# Patient Record
Sex: FEMALE | Race: BLACK OR AFRICAN AMERICAN | NOT HISPANIC OR LATINO
[De-identification: names, ages, dates, MRNs, and addresses within clinical notes are randomized per-mention and may not be internally consistent; named-entity substitution may affect disease eponyms.]

---

## 2017-05-22 ENCOUNTER — APPOINTMENT (OUTPATIENT)
Dept: ORTHOPEDIC SURGERY | Facility: CLINIC | Age: 52
End: 2017-05-22

## 2017-06-05 ENCOUNTER — APPOINTMENT (OUTPATIENT)
Dept: ORTHOPEDIC SURGERY | Facility: CLINIC | Age: 52
End: 2017-06-05

## 2017-06-05 VITALS
SYSTOLIC BLOOD PRESSURE: 115 MMHG | HEART RATE: 91 BPM | DIASTOLIC BLOOD PRESSURE: 72 MMHG | BODY MASS INDEX: 33.64 KG/M2 | WEIGHT: 235 LBS | HEIGHT: 70 IN

## 2017-06-05 VITALS
HEART RATE: 91 BPM | HEIGHT: 70 IN | WEIGHT: 235 LBS | BODY MASS INDEX: 33.64 KG/M2 | SYSTOLIC BLOOD PRESSURE: 129 MMHG | DIASTOLIC BLOOD PRESSURE: 79 MMHG

## 2017-06-10 RX ORDER — HYALURONATE SOD, CROSS-LINKED 30 MG/3 ML
30 SYRINGE (ML) INTRAARTICULAR
Refills: 0 | Status: COMPLETED | OUTPATIENT
Start: 2017-06-10

## 2017-06-10 RX ADMIN — Medication 0 MG/3ML: at 00:00

## 2017-07-31 ENCOUNTER — OUTPATIENT (OUTPATIENT)
Dept: OUTPATIENT SERVICES | Facility: HOSPITAL | Age: 52
LOS: 1 days | End: 2017-07-31
Payer: COMMERCIAL

## 2017-07-31 ENCOUNTER — APPOINTMENT (OUTPATIENT)
Dept: UROGYNECOLOGY | Facility: CLINIC | Age: 52
End: 2017-07-31
Payer: COMMERCIAL

## 2017-07-31 VITALS
TEMPERATURE: 99 F | RESPIRATION RATE: 16 BRPM | SYSTOLIC BLOOD PRESSURE: 127 MMHG | DIASTOLIC BLOOD PRESSURE: 76 MMHG | WEIGHT: 242.51 LBS | HEART RATE: 80 BPM | HEIGHT: 70 IN

## 2017-07-31 VITALS
WEIGHT: 240 LBS | SYSTOLIC BLOOD PRESSURE: 110 MMHG | DIASTOLIC BLOOD PRESSURE: 70 MMHG | BODY MASS INDEX: 34.36 KG/M2 | HEIGHT: 70 IN

## 2017-07-31 DIAGNOSIS — Z01.818 ENCOUNTER FOR OTHER PREPROCEDURAL EXAMINATION: ICD-10-CM

## 2017-07-31 DIAGNOSIS — R35.0 FREQUENCY OF MICTURITION: ICD-10-CM

## 2017-07-31 DIAGNOSIS — N84.1 POLYP OF CERVIX UTERI: ICD-10-CM

## 2017-07-31 DIAGNOSIS — Z83.511 FAMILY HISTORY OF GLAUCOMA: ICD-10-CM

## 2017-07-31 DIAGNOSIS — Z98.890 OTHER SPECIFIED POSTPROCEDURAL STATES: Chronic | ICD-10-CM

## 2017-07-31 DIAGNOSIS — Z82.49 FAMILY HISTORY OF ISCHEMIC HEART DISEASE AND OTHER DISEASES OF THE CIRCULATORY SYSTEM: ICD-10-CM

## 2017-07-31 DIAGNOSIS — L73.9 FOLLICULAR DISORDER, UNSPECIFIED: Chronic | ICD-10-CM

## 2017-07-31 DIAGNOSIS — Z86.018 PERSONAL HISTORY OF OTHER BENIGN NEOPLASM: ICD-10-CM

## 2017-07-31 DIAGNOSIS — Z83.49 FAMILY HISTORY OF OTHER ENDOCRINE, NUTRITIONAL AND METABOLIC DISEASES: ICD-10-CM

## 2017-07-31 DIAGNOSIS — R35.1 NOCTURIA: ICD-10-CM

## 2017-07-31 DIAGNOSIS — Z78.9 OTHER SPECIFIED HEALTH STATUS: ICD-10-CM

## 2017-07-31 DIAGNOSIS — R73.03 PREDIABETES: ICD-10-CM

## 2017-07-31 LAB
ANION GAP SERPL CALC-SCNC: 13 MMOL/L — SIGNIFICANT CHANGE UP (ref 5–17)
APTT BLD: 29.9 SEC — SIGNIFICANT CHANGE UP (ref 27.5–37.4)
BILIRUB UR QL STRIP: NEGATIVE
BLD GP AB SCN SERPL QL: SIGNIFICANT CHANGE UP
BUN SERPL-MCNC: 13 MG/DL — SIGNIFICANT CHANGE UP (ref 8–20)
CALCIUM SERPL-MCNC: 9.3 MG/DL — SIGNIFICANT CHANGE UP (ref 8.6–10.2)
CHLORIDE SERPL-SCNC: 105 MMOL/L — SIGNIFICANT CHANGE UP (ref 98–107)
CLARITY UR: NORMAL
CO2 SERPL-SCNC: 22 MMOL/L — SIGNIFICANT CHANGE UP (ref 22–29)
COLLECTION METHOD: NORMAL
CREAT SERPL-MCNC: 0.77 MG/DL — SIGNIFICANT CHANGE UP (ref 0.5–1.3)
GLUCOSE SERPL-MCNC: 81 MG/DL — SIGNIFICANT CHANGE UP (ref 70–115)
GLUCOSE UR-MCNC: NEGATIVE
HBA1C BLD-MCNC: 5.4 % — SIGNIFICANT CHANGE UP (ref 4–5.6)
HCG UR QL: 0.2 EU/DL
HCT VFR BLD CALC: 40.3 % — SIGNIFICANT CHANGE UP (ref 37–47)
HGB BLD-MCNC: 13.5 G/DL — SIGNIFICANT CHANGE UP (ref 12–16)
HGB UR QL STRIP.AUTO: NORMAL
INR BLD: 0.95 RATIO — SIGNIFICANT CHANGE UP (ref 0.88–1.16)
KETONES UR-MCNC: NEGATIVE
LEUKOCYTE ESTERASE UR QL STRIP: NEGATIVE
MCHC RBC-ENTMCNC: 28.9 PG — SIGNIFICANT CHANGE UP (ref 27–31)
MCHC RBC-ENTMCNC: 33.5 G/DL — SIGNIFICANT CHANGE UP (ref 32–36)
MCV RBC AUTO: 86.3 FL — SIGNIFICANT CHANGE UP (ref 81–99)
NITRITE UR QL STRIP: NEGATIVE
PH UR STRIP: 6.5
PLATELET # BLD AUTO: 318 K/UL — SIGNIFICANT CHANGE UP (ref 150–400)
POTASSIUM SERPL-MCNC: 4.5 MMOL/L — SIGNIFICANT CHANGE UP (ref 3.5–5.3)
POTASSIUM SERPL-SCNC: 4.5 MMOL/L — SIGNIFICANT CHANGE UP (ref 3.5–5.3)
PROT UR STRIP-MCNC: NEGATIVE
PROTHROM AB SERPL-ACNC: 10.4 SEC — SIGNIFICANT CHANGE UP (ref 9.8–12.7)
RBC # BLD: 4.67 M/UL — SIGNIFICANT CHANGE UP (ref 4.4–5.2)
RBC # FLD: 14.1 % — SIGNIFICANT CHANGE UP (ref 11–15.6)
SODIUM SERPL-SCNC: 140 MMOL/L — SIGNIFICANT CHANGE UP (ref 135–145)
SP GR UR STRIP: 1.02
TYPE + AB SCN PNL BLD: SIGNIFICANT CHANGE UP
WBC # BLD: 5.6 K/UL — SIGNIFICANT CHANGE UP (ref 4.8–10.8)
WBC # FLD AUTO: 5.6 K/UL — SIGNIFICANT CHANGE UP (ref 4.8–10.8)

## 2017-07-31 PROCEDURE — 83036 HEMOGLOBIN GLYCOSYLATED A1C: CPT

## 2017-07-31 PROCEDURE — G0463: CPT

## 2017-07-31 PROCEDURE — 36415 COLL VENOUS BLD VENIPUNCTURE: CPT

## 2017-07-31 PROCEDURE — 85610 PROTHROMBIN TIME: CPT

## 2017-07-31 PROCEDURE — 93005 ELECTROCARDIOGRAM TRACING: CPT

## 2017-07-31 PROCEDURE — 81003 URINALYSIS AUTO W/O SCOPE: CPT | Mod: QW

## 2017-07-31 PROCEDURE — 86901 BLOOD TYPING SEROLOGIC RH(D): CPT

## 2017-07-31 PROCEDURE — 51701 INSERT BLADDER CATHETER: CPT

## 2017-07-31 PROCEDURE — 85730 THROMBOPLASTIN TIME PARTIAL: CPT

## 2017-07-31 PROCEDURE — 80048 BASIC METABOLIC PNL TOTAL CA: CPT

## 2017-07-31 PROCEDURE — 85027 COMPLETE CBC AUTOMATED: CPT

## 2017-07-31 PROCEDURE — 86850 RBC ANTIBODY SCREEN: CPT

## 2017-07-31 PROCEDURE — 93010 ELECTROCARDIOGRAM REPORT: CPT

## 2017-07-31 PROCEDURE — 99244 OFF/OP CNSLTJ NEW/EST MOD 40: CPT | Mod: 25

## 2017-07-31 PROCEDURE — 86900 BLOOD TYPING SEROLOGIC ABO: CPT

## 2017-07-31 NOTE — H&P PST ADULT - PROBLEM SELECTOR PLAN 1
Hysterectomy, Dilation and Curettage, Polypectomy  Pt will obtain clearance with Endocrinologist as he is the one who manages pt's Diabetes

## 2017-07-31 NOTE — H&P PST ADULT - EKG AND INTERPRETATION
EKG demonstrates RSR at 80bpm with Q-wave and T Wave inversion noted in Lead III.  Pending official reading

## 2017-07-31 NOTE — H&P PST ADULT - FAMILY HISTORY
Father  Still living? Yes, Estimated age: 71-80  Family history of acute myocardial infarction, Age at diagnosis: 61-70  Family history of pancreatic cancer, Age at diagnosis: Age Unknown  Family history of hypertension, Age at diagnosis: Age Unknown     Mother  Still living? No  Family history of gastric cancer, Age at diagnosis: 51-60

## 2017-07-31 NOTE — H&P PST ADULT - HISTORY OF PRESENT ILLNESS
This is a 52 y.o female who presents to PST today.  The pt reports she was undergoing a routine PAP examination and was noted to have a  polyp.  She is scheduled for surgery in the near future.

## 2017-07-31 NOTE — H&P PST ADULT - NSANTHOSAYNRD_GEN_A_CORE
No. REYNALDO screening performed.  STOP BANG Legend: 0-2 = LOW Risk; 3-4 = INTERMEDIATE Risk; 5-8 = HIGH Risk

## 2017-07-31 NOTE — H&P PST ADULT - RS GEN PE MLT RESP DETAILS PC
respirations non-labored/airway patent/diminished breath sounds, R/diminished breath sounds, L/normal

## 2017-08-04 ENCOUNTER — RESULT REVIEW (OUTPATIENT)
Age: 52
End: 2017-08-04

## 2017-08-04 ENCOUNTER — OUTPATIENT (OUTPATIENT)
Dept: OUTPATIENT SERVICES | Facility: HOSPITAL | Age: 52
LOS: 1 days | End: 2017-08-04
Payer: COMMERCIAL

## 2017-08-04 ENCOUNTER — TRANSCRIPTION ENCOUNTER (OUTPATIENT)
Age: 52
End: 2017-08-04

## 2017-08-04 VITALS
OXYGEN SATURATION: 100 % | DIASTOLIC BLOOD PRESSURE: 64 MMHG | SYSTOLIC BLOOD PRESSURE: 120 MMHG | RESPIRATION RATE: 18 BRPM | HEART RATE: 80 BPM

## 2017-08-04 VITALS
OXYGEN SATURATION: 97 % | RESPIRATION RATE: 16 BRPM | HEART RATE: 72 BPM | SYSTOLIC BLOOD PRESSURE: 110 MMHG | DIASTOLIC BLOOD PRESSURE: 64 MMHG | TEMPERATURE: 98 F | HEIGHT: 70 IN | WEIGHT: 229.94 LBS

## 2017-08-04 DIAGNOSIS — L73.9 FOLLICULAR DISORDER, UNSPECIFIED: Chronic | ICD-10-CM

## 2017-08-04 DIAGNOSIS — N84.1 POLYP OF CERVIX UTERI: ICD-10-CM

## 2017-08-04 DIAGNOSIS — Z01.818 ENCOUNTER FOR OTHER PREPROCEDURAL EXAMINATION: ICD-10-CM

## 2017-08-04 DIAGNOSIS — Z98.890 OTHER SPECIFIED POSTPROCEDURAL STATES: Chronic | ICD-10-CM

## 2017-08-04 PROCEDURE — 58558 HYSTEROSCOPY BIOPSY: CPT

## 2017-08-04 PROCEDURE — 88305 TISSUE EXAM BY PATHOLOGIST: CPT

## 2017-08-04 PROCEDURE — 88305 TISSUE EXAM BY PATHOLOGIST: CPT | Mod: 26

## 2017-08-04 RX ORDER — HYDROMORPHONE HYDROCHLORIDE 2 MG/ML
0.5 INJECTION INTRAMUSCULAR; INTRAVENOUS; SUBCUTANEOUS
Qty: 0 | Refills: 0 | Status: DISCONTINUED | OUTPATIENT
Start: 2017-08-04 | End: 2017-08-04

## 2017-08-04 RX ORDER — ONDANSETRON 8 MG/1
4 TABLET, FILM COATED ORAL ONCE
Qty: 0 | Refills: 0 | Status: DISCONTINUED | OUTPATIENT
Start: 2017-08-04 | End: 2017-08-04

## 2017-08-04 RX ORDER — METFORMIN HYDROCHLORIDE 850 MG/1
1 TABLET ORAL
Qty: 0 | Refills: 0 | COMMUNITY

## 2017-08-04 RX ORDER — CABERGOLINE 0.5 MG/1
0 TABLET ORAL
Qty: 0 | Refills: 0 | COMMUNITY

## 2017-08-04 RX ORDER — SODIUM CHLORIDE 9 MG/ML
1000 INJECTION INTRAMUSCULAR; INTRAVENOUS; SUBCUTANEOUS
Qty: 0 | Refills: 0 | Status: DISCONTINUED | OUTPATIENT
Start: 2017-08-04 | End: 2017-08-04

## 2017-08-04 RX ORDER — SODIUM CHLORIDE 9 MG/ML
3 INJECTION INTRAMUSCULAR; INTRAVENOUS; SUBCUTANEOUS ONCE
Qty: 0 | Refills: 0 | Status: DISCONTINUED | OUTPATIENT
Start: 2017-08-04 | End: 2017-08-04

## 2017-08-04 NOTE — BRIEF OPERATIVE NOTE - PROCEDURE
Hysteroscopy  08/04/2017    Active  [email or web address]  Dilation and curettage  08/04/2017    Active  [email or web address]  Endocervical polypectomy  08/04/2017    Active  AFIA

## 2017-08-07 LAB — SURGICAL PATHOLOGY FINAL REPORT - CH: SIGNIFICANT CHANGE UP

## 2017-08-21 ENCOUNTER — APPOINTMENT (OUTPATIENT)
Dept: UROGYNECOLOGY | Facility: CLINIC | Age: 52
End: 2017-08-21
Payer: COMMERCIAL

## 2017-08-21 VITALS
DIASTOLIC BLOOD PRESSURE: 88 MMHG | SYSTOLIC BLOOD PRESSURE: 130 MMHG | WEIGHT: 240 LBS | HEIGHT: 70 IN | BODY MASS INDEX: 34.36 KG/M2

## 2017-08-21 PROCEDURE — 51797 INTRAABDOMINAL PRESSURE TEST: CPT

## 2017-08-21 PROCEDURE — 51729 CYSTOMETROGRAM W/VP&UP: CPT

## 2017-08-21 PROCEDURE — 51741 ELECTRO-UROFLOWMETRY FIRST: CPT

## 2017-08-21 PROCEDURE — 51784 ANAL/URINARY MUSCLE STUDY: CPT

## 2017-11-01 ENCOUNTER — OTHER (OUTPATIENT)
Age: 52
End: 2017-11-01

## 2017-11-02 ENCOUNTER — APPOINTMENT (OUTPATIENT)
Dept: UROGYNECOLOGY | Facility: CLINIC | Age: 52
End: 2017-11-02
Payer: COMMERCIAL

## 2017-11-02 PROCEDURE — 99214 OFFICE O/P EST MOD 30 MIN: CPT

## 2017-11-18 ENCOUNTER — APPOINTMENT (OUTPATIENT)
Dept: ORTHOPEDIC SURGERY | Facility: CLINIC | Age: 52
End: 2017-11-18
Payer: COMMERCIAL

## 2017-11-18 VITALS
WEIGHT: 240 LBS | HEIGHT: 70 IN | SYSTOLIC BLOOD PRESSURE: 128 MMHG | BODY MASS INDEX: 34.36 KG/M2 | DIASTOLIC BLOOD PRESSURE: 77 MMHG | HEART RATE: 97 BPM

## 2017-11-18 PROCEDURE — 99214 OFFICE O/P EST MOD 30 MIN: CPT

## 2017-11-18 PROCEDURE — 73564 X-RAY EXAM KNEE 4 OR MORE: CPT | Mod: 50

## 2017-12-05 ENCOUNTER — OUTPATIENT (OUTPATIENT)
Dept: OUTPATIENT SERVICES | Facility: HOSPITAL | Age: 52
LOS: 1 days | End: 2017-12-05
Payer: COMMERCIAL

## 2017-12-05 VITALS
HEART RATE: 95 BPM | DIASTOLIC BLOOD PRESSURE: 77 MMHG | RESPIRATION RATE: 16 BRPM | WEIGHT: 246.92 LBS | TEMPERATURE: 98 F | SYSTOLIC BLOOD PRESSURE: 130 MMHG | HEIGHT: 70 IN

## 2017-12-05 DIAGNOSIS — L73.9 FOLLICULAR DISORDER, UNSPECIFIED: Chronic | ICD-10-CM

## 2017-12-05 DIAGNOSIS — Z01.818 ENCOUNTER FOR OTHER PREPROCEDURAL EXAMINATION: ICD-10-CM

## 2017-12-05 DIAGNOSIS — Z98.890 OTHER SPECIFIED POSTPROCEDURAL STATES: Chronic | ICD-10-CM

## 2017-12-05 DIAGNOSIS — N39.3 STRESS INCONTINENCE (FEMALE) (MALE): ICD-10-CM

## 2017-12-05 LAB
ANION GAP SERPL CALC-SCNC: 13 MMOL/L — SIGNIFICANT CHANGE UP (ref 5–17)
APPEARANCE UR: CLEAR — SIGNIFICANT CHANGE UP
APTT BLD: 31.1 SEC — SIGNIFICANT CHANGE UP (ref 27.5–37.4)
BACTERIA # UR AUTO: ABNORMAL
BASOPHILS # BLD AUTO: 0 K/UL — SIGNIFICANT CHANGE UP (ref 0–0.2)
BASOPHILS NFR BLD AUTO: 0.6 % — SIGNIFICANT CHANGE UP (ref 0–2)
BILIRUB UR-MCNC: NEGATIVE — SIGNIFICANT CHANGE UP
BLD GP AB SCN SERPL QL: SIGNIFICANT CHANGE UP
BUN SERPL-MCNC: 14 MG/DL — SIGNIFICANT CHANGE UP (ref 8–20)
CALCIUM SERPL-MCNC: 9.6 MG/DL — SIGNIFICANT CHANGE UP (ref 8.6–10.2)
CHLORIDE SERPL-SCNC: 102 MMOL/L — SIGNIFICANT CHANGE UP (ref 98–107)
CO2 SERPL-SCNC: 26 MMOL/L — SIGNIFICANT CHANGE UP (ref 22–29)
COLOR SPEC: YELLOW — SIGNIFICANT CHANGE UP
CREAT SERPL-MCNC: 0.87 MG/DL — SIGNIFICANT CHANGE UP (ref 0.5–1.3)
DIFF PNL FLD: ABNORMAL
EOSINOPHIL # BLD AUTO: 0.1 K/UL — SIGNIFICANT CHANGE UP (ref 0–0.5)
EOSINOPHIL NFR BLD AUTO: 1.7 % — SIGNIFICANT CHANGE UP (ref 0–6)
EPI CELLS # UR: SIGNIFICANT CHANGE UP
GLUCOSE SERPL-MCNC: 111 MG/DL — SIGNIFICANT CHANGE UP (ref 70–115)
GLUCOSE UR QL: NEGATIVE MG/DL — SIGNIFICANT CHANGE UP
HCT VFR BLD CALC: 40.4 % — SIGNIFICANT CHANGE UP (ref 37–47)
HGB BLD-MCNC: 13.4 G/DL — SIGNIFICANT CHANGE UP (ref 12–16)
INR BLD: 0.97 RATIO — SIGNIFICANT CHANGE UP (ref 0.88–1.16)
KETONES UR-MCNC: ABNORMAL
LEUKOCYTE ESTERASE UR-ACNC: ABNORMAL
LYMPHOCYTES # BLD AUTO: 2.2 K/UL — SIGNIFICANT CHANGE UP (ref 1–4.8)
LYMPHOCYTES # BLD AUTO: 42.4 % — SIGNIFICANT CHANGE UP (ref 20–55)
MCHC RBC-ENTMCNC: 28.8 PG — SIGNIFICANT CHANGE UP (ref 27–31)
MCHC RBC-ENTMCNC: 33.2 G/DL — SIGNIFICANT CHANGE UP (ref 32–36)
MCV RBC AUTO: 86.7 FL — SIGNIFICANT CHANGE UP (ref 81–99)
MONOCYTES # BLD AUTO: 0.4 K/UL — SIGNIFICANT CHANGE UP (ref 0–0.8)
MONOCYTES NFR BLD AUTO: 7.7 % — SIGNIFICANT CHANGE UP (ref 3–10)
NEUTROPHILS # BLD AUTO: 2.5 K/UL — SIGNIFICANT CHANGE UP (ref 1.8–8)
NEUTROPHILS NFR BLD AUTO: 47.6 % — SIGNIFICANT CHANGE UP (ref 37–73)
NITRITE UR-MCNC: NEGATIVE — SIGNIFICANT CHANGE UP
PH UR: 6 — SIGNIFICANT CHANGE UP (ref 5–8)
PLATELET # BLD AUTO: 325 K/UL — SIGNIFICANT CHANGE UP (ref 150–400)
POTASSIUM SERPL-MCNC: 4 MMOL/L — SIGNIFICANT CHANGE UP (ref 3.5–5.3)
POTASSIUM SERPL-SCNC: 4 MMOL/L — SIGNIFICANT CHANGE UP (ref 3.5–5.3)
PROT UR-MCNC: 15 MG/DL
PROTHROM AB SERPL-ACNC: 10.7 SEC — SIGNIFICANT CHANGE UP (ref 9.8–12.7)
RBC # BLD: 4.66 M/UL — SIGNIFICANT CHANGE UP (ref 4.4–5.2)
RBC # FLD: 13.8 % — SIGNIFICANT CHANGE UP (ref 11–15.6)
RBC CASTS # UR COMP ASSIST: SIGNIFICANT CHANGE UP /HPF (ref 0–4)
SODIUM SERPL-SCNC: 141 MMOL/L — SIGNIFICANT CHANGE UP (ref 135–145)
SP GR SPEC: 1.02 — SIGNIFICANT CHANGE UP (ref 1.01–1.02)
TYPE + AB SCN PNL BLD: SIGNIFICANT CHANGE UP
UROBILINOGEN FLD QL: NEGATIVE MG/DL — SIGNIFICANT CHANGE UP
WBC # BLD: 5.2 K/UL — SIGNIFICANT CHANGE UP (ref 4.8–10.8)
WBC # FLD AUTO: 5.2 K/UL — SIGNIFICANT CHANGE UP (ref 4.8–10.8)
WBC UR QL: SIGNIFICANT CHANGE UP

## 2017-12-05 PROCEDURE — 80048 BASIC METABOLIC PNL TOTAL CA: CPT

## 2017-12-05 PROCEDURE — G0463: CPT

## 2017-12-05 PROCEDURE — 93005 ELECTROCARDIOGRAM TRACING: CPT

## 2017-12-05 PROCEDURE — 86900 BLOOD TYPING SEROLOGIC ABO: CPT

## 2017-12-05 PROCEDURE — 36415 COLL VENOUS BLD VENIPUNCTURE: CPT

## 2017-12-05 PROCEDURE — 85610 PROTHROMBIN TIME: CPT

## 2017-12-05 PROCEDURE — 85730 THROMBOPLASTIN TIME PARTIAL: CPT

## 2017-12-05 PROCEDURE — 86901 BLOOD TYPING SEROLOGIC RH(D): CPT

## 2017-12-05 PROCEDURE — 87086 URINE CULTURE/COLONY COUNT: CPT

## 2017-12-05 PROCEDURE — 86850 RBC ANTIBODY SCREEN: CPT

## 2017-12-05 PROCEDURE — 81001 URINALYSIS AUTO W/SCOPE: CPT

## 2017-12-05 PROCEDURE — 85027 COMPLETE CBC AUTOMATED: CPT

## 2017-12-05 PROCEDURE — 93010 ELECTROCARDIOGRAM REPORT: CPT

## 2017-12-05 RX ORDER — CEFAZOLIN SODIUM 1 G
2000 VIAL (EA) INJECTION ONCE
Qty: 0 | Refills: 0 | Status: DISCONTINUED | OUTPATIENT
Start: 2017-12-19 | End: 2018-01-03

## 2017-12-05 RX ORDER — SODIUM CHLORIDE 9 MG/ML
3 INJECTION INTRAMUSCULAR; INTRAVENOUS; SUBCUTANEOUS ONCE
Qty: 0 | Refills: 0 | Status: DISCONTINUED | OUTPATIENT
Start: 2017-12-19 | End: 2018-01-03

## 2017-12-05 RX ORDER — CABERGOLINE 0.5 MG/1
0 TABLET ORAL
Qty: 13 | Refills: 0 | COMMUNITY

## 2017-12-05 NOTE — H&P PST ADULT - ASSESSMENT
52 year old female with H/o PCOS presents with c/o stress incontinence. She is now schedule for suburethral sling, Cystoscopy

## 2017-12-05 NOTE — H&P PST ADULT - PSH
Disorder of hair follicle  Beneith bilateral axilla  H/O arthroscopy of right knee    S/P dilatation and curettage

## 2017-12-06 LAB
CULTURE RESULTS: SIGNIFICANT CHANGE UP
SPECIMEN SOURCE: SIGNIFICANT CHANGE UP

## 2017-12-08 ENCOUNTER — CHART COPY (OUTPATIENT)
Age: 52
End: 2017-12-08

## 2017-12-19 ENCOUNTER — APPOINTMENT (OUTPATIENT)
Dept: UROGYNECOLOGY | Facility: HOSPITAL | Age: 52
End: 2017-12-19
Payer: COMMERCIAL

## 2017-12-19 ENCOUNTER — OUTPATIENT (OUTPATIENT)
Dept: OUTPATIENT SERVICES | Facility: HOSPITAL | Age: 52
LOS: 1 days | End: 2017-12-19
Payer: COMMERCIAL

## 2017-12-19 ENCOUNTER — TRANSCRIPTION ENCOUNTER (OUTPATIENT)
Age: 52
End: 2017-12-19

## 2017-12-19 VITALS
DIASTOLIC BLOOD PRESSURE: 66 MMHG | SYSTOLIC BLOOD PRESSURE: 135 MMHG | TEMPERATURE: 98 F | HEIGHT: 70 IN | WEIGHT: 246.92 LBS | RESPIRATION RATE: 16 BRPM | HEART RATE: 79 BPM | OXYGEN SATURATION: 98 %

## 2017-12-19 VITALS
OXYGEN SATURATION: 99 % | HEART RATE: 71 BPM | RESPIRATION RATE: 16 BRPM | SYSTOLIC BLOOD PRESSURE: 123 MMHG | DIASTOLIC BLOOD PRESSURE: 78 MMHG

## 2017-12-19 DIAGNOSIS — Z98.890 OTHER SPECIFIED POSTPROCEDURAL STATES: Chronic | ICD-10-CM

## 2017-12-19 DIAGNOSIS — L73.9 FOLLICULAR DISORDER, UNSPECIFIED: Chronic | ICD-10-CM

## 2017-12-19 DIAGNOSIS — N39.3 STRESS INCONTINENCE (FEMALE) (MALE): ICD-10-CM

## 2017-12-19 PROCEDURE — C1771: CPT

## 2017-12-19 PROCEDURE — 57288 REPAIR BLADDER DEFECT: CPT

## 2017-12-19 RX ORDER — SODIUM CHLORIDE 9 MG/ML
1000 INJECTION, SOLUTION INTRAVENOUS
Qty: 0 | Refills: 0 | Status: DISCONTINUED | OUTPATIENT
Start: 2017-12-19 | End: 2017-12-19

## 2017-12-19 RX ORDER — ONDANSETRON 8 MG/1
4 TABLET, FILM COATED ORAL ONCE
Qty: 0 | Refills: 0 | Status: DISCONTINUED | OUTPATIENT
Start: 2017-12-19 | End: 2017-12-19

## 2017-12-19 RX ORDER — TRAMADOL HYDROCHLORIDE 50 MG/1
1 TABLET ORAL
Qty: 12 | Refills: 0
Start: 2017-12-19

## 2017-12-19 RX ORDER — FENTANYL CITRATE 50 UG/ML
25 INJECTION INTRAVENOUS
Qty: 0 | Refills: 0 | Status: DISCONTINUED | OUTPATIENT
Start: 2017-12-19 | End: 2017-12-19

## 2017-12-19 NOTE — ASU DISCHARGE PLAN (ADULT/PEDIATRIC). - ACTIVITY LEVEL
no tampons/no exercise/no heavy lifting/no tub baths/no douching/no intercourse/nothing per vagina/no sports/gym

## 2017-12-19 NOTE — BRIEF OPERATIVE NOTE - PROCEDURE
<<-----Click on this checkbox to enter Procedure Retropubic sling procedure using transvaginal tape for female stress incontinence with cystoscopy  12/19/2017    Active  PFINAMORE

## 2017-12-19 NOTE — ASU DISCHARGE PLAN (ADULT/PEDIATRIC). - MEDICATION SUMMARY - MEDICATIONS TO TAKE
I will START or STAY ON the medications listed below when I get home from the hospital:    traMADol 50 mg oral tablet  -- 1 tab(s) by mouth every 4 hours, As Needed MDD:200mg  -- Caution federal law prohibits the transfer of this drug to any person other  than the person for whom it was prescribed.  May cause drowsiness.  Alcohol may intensify this effect.  Use care when operating dangerous machinery.  Obtain medical advice before taking any non-prescription drugs as some may affect the action of this medication.    -- Indication: For postop pain     metFORMIN 500 mg oral tablet  -- 1 tab(s) by mouth once a day  -- Indication: For as prior to surgery     CABERGOLINE 0.5 MG TABLET  -- 0.5  by mouth every other week  -- Indication: For as prior to surgery

## 2017-12-19 NOTE — ASU DISCHARGE PLAN (ADULT/PEDIATRIC). - NOTIFY
Unable to Urinate/Bleeding that does not stop Unable to Urinate/Bleeding that does not stop/Fever greater than 101

## 2018-01-04 ENCOUNTER — APPOINTMENT (OUTPATIENT)
Dept: UROGYNECOLOGY | Facility: CLINIC | Age: 53
End: 2018-01-04
Payer: COMMERCIAL

## 2018-01-15 ENCOUNTER — APPOINTMENT (OUTPATIENT)
Dept: UROGYNECOLOGY | Facility: CLINIC | Age: 53
End: 2018-01-15
Payer: COMMERCIAL

## 2018-01-17 ENCOUNTER — RESULT CHARGE (OUTPATIENT)
Age: 53
End: 2018-01-17

## 2018-01-18 ENCOUNTER — APPOINTMENT (OUTPATIENT)
Dept: UROGYNECOLOGY | Facility: CLINIC | Age: 53
End: 2018-01-18
Payer: COMMERCIAL

## 2018-01-18 VITALS
SYSTOLIC BLOOD PRESSURE: 130 MMHG | HEIGHT: 70 IN | BODY MASS INDEX: 34.65 KG/M2 | DIASTOLIC BLOOD PRESSURE: 72 MMHG | RESPIRATION RATE: 16 BRPM | WEIGHT: 242 LBS

## 2018-01-18 LAB
BILIRUB UR QL STRIP: NORMAL
CLARITY UR: NORMAL
COLLECTION METHOD: NORMAL
GLUCOSE UR-MCNC: NEGATIVE
HCG UR QL: 0.2 EU/DL
HGB UR QL STRIP.AUTO: NORMAL
KETONES UR-MCNC: NORMAL
LEUKOCYTE ESTERASE UR QL STRIP: NORMAL
NITRITE UR QL STRIP: NEGATIVE
PH UR STRIP: 6
PROT UR STRIP-MCNC: NORMAL
SP GR UR STRIP: >=1.03

## 2018-01-18 PROCEDURE — 81003 URINALYSIS AUTO W/O SCOPE: CPT | Mod: QW

## 2018-01-18 PROCEDURE — 99024 POSTOP FOLLOW-UP VISIT: CPT

## 2018-01-26 NOTE — H&P PST ADULT - MAMMOGRAM, LAST, PROFILE
· You should return if your ears plug up with ear wax causing difficulty hearing or pressure. · You should return for an annual hearing test to monitor your hearing, and whenever your hearing further decreases significantly. · You should employ the tinnitus masking techniques and strategies we discussed, as needed. Bedside tinnitus masking devices (eg. a white noise machine, noise machine, fan on in the room, radio with light music or tuned between stations to white noise static) can be very helpful. · You should avoid exposure to excessively high levels of noise/sound and use hearing protection measures we discussed, as needed, if such exposure is unavoidable. · I recommend that you use Lipoflavonoid Plus, (use brand name, not generic, for the first six months), for treatment of your tinnitus. This is available \"over the counter\" at your pharmacy. You should take two orally three times a day for the first 60 days, then one orally three times a day thereafter. Take this medication continuously for six months. If you have seen no improvement in your tinnitus after six months, you should consider cessation of this treatment. · NO Q-TIPS IN THE EARS  You should never clean your ears with a Q-tip, cotton tipped applicator, Olivia pin, paper clip, or any other instrument. I recommend only use of one the several ear wax removal kits available \"over the counter\" (eg. [employer] or Murine, or [employer]) if you feel a need to try to remove ear wax. No other methods should be self used for this purpose as there is danger of injury to the ear and risk of irreparable and irreversible permanent hearing loss.
2016

## 2018-02-01 ENCOUNTER — APPOINTMENT (OUTPATIENT)
Dept: ORTHOPEDIC SURGERY | Facility: CLINIC | Age: 53
End: 2018-02-01
Payer: COMMERCIAL

## 2018-02-01 VITALS
TEMPERATURE: 98.5 F | SYSTOLIC BLOOD PRESSURE: 130 MMHG | RESPIRATION RATE: 18 BRPM | HEART RATE: 88 BPM | DIASTOLIC BLOOD PRESSURE: 80 MMHG

## 2018-02-01 PROCEDURE — 20610 DRAIN/INJ JOINT/BURSA W/O US: CPT | Mod: LT

## 2018-02-01 RX ORDER — OXYCODONE AND ACETAMINOPHEN 5; 325 MG/1; MG/1
5-325 TABLET ORAL
Qty: 12 | Refills: 0 | Status: COMPLETED | COMMUNITY
Start: 2017-12-19

## 2018-02-01 RX ADMIN — Medication 0 MG/3ML: at 00:00

## 2018-02-05 ENCOUNTER — APPOINTMENT (OUTPATIENT)
Dept: UROGYNECOLOGY | Facility: CLINIC | Age: 53
End: 2018-02-05
Payer: COMMERCIAL

## 2018-02-05 VITALS
SYSTOLIC BLOOD PRESSURE: 120 MMHG | HEIGHT: 70 IN | WEIGHT: 242 LBS | BODY MASS INDEX: 34.65 KG/M2 | DIASTOLIC BLOOD PRESSURE: 60 MMHG

## 2018-02-05 LAB
BILIRUB UR QL STRIP: NEGATIVE
CLARITY UR: CLEAR
COLLECTION METHOD: NORMAL
GLUCOSE UR-MCNC: NEGATIVE
HCG UR QL: 0.2 EU/DL
HGB UR QL STRIP.AUTO: NORMAL
KETONES UR-MCNC: NEGATIVE
LEUKOCYTE ESTERASE UR QL STRIP: NORMAL
NITRITE UR QL STRIP: NEGATIVE
PH UR STRIP: 6
PROT UR STRIP-MCNC: NORMAL
SP GR UR STRIP: 1.02

## 2018-02-05 PROCEDURE — 99024 POSTOP FOLLOW-UP VISIT: CPT

## 2018-02-05 PROCEDURE — 81003 URINALYSIS AUTO W/O SCOPE: CPT | Mod: QW

## 2018-02-08 RX ORDER — HYALURONATE SOD, CROSS-LINKED 30 MG/3 ML
30 SYRINGE (ML) INTRAARTICULAR
Refills: 0 | Status: COMPLETED | OUTPATIENT
Start: 2018-02-01

## 2018-04-19 ENCOUNTER — APPOINTMENT (OUTPATIENT)
Dept: UROGYNECOLOGY | Facility: CLINIC | Age: 53
End: 2018-04-19
Payer: COMMERCIAL

## 2018-04-19 DIAGNOSIS — R32 UNSPECIFIED URINARY INCONTINENCE: ICD-10-CM

## 2018-04-19 PROCEDURE — L8606: CPT

## 2018-04-19 PROCEDURE — 51715 ENDOSCOPIC INJECTION/IMPLANT: CPT

## 2018-07-16 PROBLEM — R73.03 PREDIABETES: Chronic | Status: INACTIVE | Noted: 2017-07-31 | Resolved: 2017-12-05

## 2018-07-17 ENCOUNTER — APPOINTMENT (OUTPATIENT)
Dept: ORTHOPEDIC SURGERY | Facility: CLINIC | Age: 53
End: 2018-07-17

## 2018-07-19 ENCOUNTER — APPOINTMENT (OUTPATIENT)
Dept: UROGYNECOLOGY | Facility: CLINIC | Age: 53
End: 2018-07-19

## 2018-09-18 PROBLEM — E28.2 POLYCYSTIC OVARIAN SYNDROME: Chronic | Status: ACTIVE | Noted: 2017-12-05

## 2018-10-03 ENCOUNTER — APPOINTMENT (OUTPATIENT)
Dept: ORTHOPEDIC SURGERY | Facility: CLINIC | Age: 53
End: 2018-10-03
Payer: COMMERCIAL

## 2018-10-03 VITALS
SYSTOLIC BLOOD PRESSURE: 115 MMHG | HEART RATE: 89 BPM | WEIGHT: 245 LBS | DIASTOLIC BLOOD PRESSURE: 75 MMHG | HEIGHT: 70 IN | BODY MASS INDEX: 35.07 KG/M2

## 2018-10-03 PROCEDURE — 99214 OFFICE O/P EST MOD 30 MIN: CPT | Mod: 25

## 2018-10-03 PROCEDURE — 20610 DRAIN/INJ JOINT/BURSA W/O US: CPT | Mod: RT

## 2018-10-03 RX ORDER — ERGOCALCIFEROL 1.25 MG/1
1.25 MG CAPSULE, LIQUID FILLED ORAL
Qty: 4 | Refills: 0 | Status: DISCONTINUED | COMMUNITY
Start: 2017-05-08 | End: 2018-10-03

## 2018-10-03 RX ORDER — HYALURONATE SOD, CROSS-LINKED 30 MG/3 ML
30 SYRINGE (ML) INTRAARTICULAR
Qty: 6 | Refills: 0 | Status: DISCONTINUED | OUTPATIENT
Start: 2017-05-03 | End: 2018-10-03

## 2018-10-03 RX ORDER — TRAMADOL HYDROCHLORIDE 50 MG/1
50 TABLET, COATED ORAL
Qty: 90 | Refills: 0 | Status: DISCONTINUED | COMMUNITY
Start: 2017-06-05 | End: 2018-10-03

## 2018-10-03 RX ORDER — DICLOFENAC SODIUM 10 MG/G
1 GEL TOPICAL DAILY
Qty: 4 | Refills: 0 | Status: DISCONTINUED | COMMUNITY
Start: 2017-11-18 | End: 2018-10-03

## 2018-10-04 ENCOUNTER — CHART COPY (OUTPATIENT)
Age: 53
End: 2018-10-04

## 2018-11-29 ENCOUNTER — APPOINTMENT (OUTPATIENT)
Dept: ORTHOPEDIC SURGERY | Facility: CLINIC | Age: 53
End: 2018-11-29
Payer: COMMERCIAL

## 2018-11-29 VITALS — HEART RATE: 101 BPM | SYSTOLIC BLOOD PRESSURE: 139 MMHG | DIASTOLIC BLOOD PRESSURE: 84 MMHG

## 2018-11-29 PROCEDURE — 20611 DRAIN/INJ JOINT/BURSA W/US: CPT | Mod: RT

## 2018-11-29 PROCEDURE — 20610 DRAIN/INJ JOINT/BURSA W/O US: CPT | Mod: LT

## 2018-11-29 RX ORDER — HYALURONATE SOD, CROSS-LINKED 30 MG/3 ML
30 SYRINGE (ML) INTRAARTICULAR
Refills: 0 | Status: COMPLETED | OUTPATIENT
Start: 2018-11-29

## 2018-11-29 RX ADMIN — Medication 0 MG/3ML: at 00:00

## 2019-05-24 ENCOUNTER — CHART COPY (OUTPATIENT)
Age: 54
End: 2019-05-24

## 2019-05-24 RX ORDER — HYALURONATE SOD, CROSS-LINKED 30 MG/3 ML
30 SYRINGE (ML) INTRAARTICULAR
Qty: 1 | Refills: 0 | Status: DISCONTINUED | COMMUNITY
Start: 2019-05-24 | End: 2019-05-24

## 2019-05-24 RX ORDER — HYALURONATE SOD, CROSS-LINKED 30 MG/3 ML
30 SYRINGE (ML) INTRAARTICULAR
Qty: 6 | Refills: 0 | Status: DISCONTINUED | OUTPATIENT
Start: 2017-12-08 | End: 2019-05-24

## 2019-06-04 ENCOUNTER — CHART COPY (OUTPATIENT)
Age: 54
End: 2019-06-04

## 2019-06-28 ENCOUNTER — CHART COPY (OUTPATIENT)
Age: 54
End: 2019-06-28

## 2019-07-08 ENCOUNTER — CHART COPY (OUTPATIENT)
Age: 54
End: 2019-07-08

## 2019-07-11 ENCOUNTER — APPOINTMENT (OUTPATIENT)
Dept: ORTHOPEDIC SURGERY | Facility: CLINIC | Age: 54
End: 2019-07-11
Payer: COMMERCIAL

## 2019-07-11 VITALS — HEART RATE: 90 BPM | DIASTOLIC BLOOD PRESSURE: 78 MMHG | SYSTOLIC BLOOD PRESSURE: 128 MMHG

## 2019-07-11 PROCEDURE — 20610 DRAIN/INJ JOINT/BURSA W/O US: CPT | Mod: RT

## 2019-09-10 DIAGNOSIS — N63.0 UNSPECIFIED LUMP IN UNSPECIFIED BREAST: ICD-10-CM

## 2019-09-13 ENCOUNTER — RESULT REVIEW (OUTPATIENT)
Age: 54
End: 2019-09-13

## 2019-09-19 ENCOUNTER — FORM ENCOUNTER (OUTPATIENT)
Age: 54
End: 2019-09-19

## 2019-09-19 ENCOUNTER — APPOINTMENT (OUTPATIENT)
Dept: SURGERY | Facility: CLINIC | Age: 54
End: 2019-09-19
Payer: COMMERCIAL

## 2019-09-19 VITALS
HEIGHT: 70 IN | WEIGHT: 245 LBS | SYSTOLIC BLOOD PRESSURE: 158 MMHG | OXYGEN SATURATION: 99 % | RESPIRATION RATE: 18 BRPM | BODY MASS INDEX: 35.07 KG/M2 | DIASTOLIC BLOOD PRESSURE: 80 MMHG | HEART RATE: 99 BPM

## 2019-09-19 DIAGNOSIS — R92.8 OTHER ABNORMAL AND INCONCLUSIVE FINDINGS ON DIAGNOSTIC IMAGING OF BREAST: ICD-10-CM

## 2019-09-19 PROCEDURE — 99205 OFFICE O/P NEW HI 60 MIN: CPT

## 2019-09-19 NOTE — HISTORY OF PRESENT ILLNESS
[FreeTextEntry1] : I had the pleasure of seeing Nikki Pak in the office for a new visit breast evaluation secondary to newly diagnosed triple negative left breast cancer.\par \par Nikki is accompanied by her  Presley.  She is a nael 55 yo nulliparous  female who has been in stable overall health.\par \par She palpated a left breast mass in the shower on labor day and underwent diagnostic mammogram/ultrasound 9/13.  A biopsy was performed demonstrating poorly differentiated IDC [id number] negative with DCIS measuring 1.5 cm.\par \par Imaging: [facility]\par Bilateral diagnostic mammogram/ultrasound 9/13/2019\par Impression: New 1.5cm solid mass left breast 12:00 Imaging features are indeterminate. No suspicious finding in the right breast. BIRADS 4: Suspicious\par US: Left breast 12:00 7cm FN 1.5cm hypoechoic round mass with slightly angular margins corresponding to the new mass on mammography. Imaging features are indeterminate. BIRADS 4: Suspicious\par \par Pathology: Left breast 12:00 7cm FN\par Poorly differentiated IDC ER- [id number]- [id number] - with DCIS \par \par We reviewed imaging and pathology. She understands the diagnosis is a 1.5cm triple negative breast cancer of the left breast.  We discussed the need for further imaging and +/- biopsy.  Clinical examination of the axilla is somewhat limited secondary to previous surgery/skin graft and scarring, however no significant adenopathy on examination.\par \par I have recommended MRI and discussed sensitivity and specificity with the patient in addition to elevated false positive rate and biopsies.  \par \par We also discussed the pathophysiology of disease and she understands genetic risk and testing was reviewed and performed.\par \par We also discussed consults with Plastic Surgery, Medical Oncology and Radiation Oncology.\par \par We will obtain authorization for additional studies.\par \par We also discussed surgical treatment options including breast conservation and the need for adjuvant radiation treatment and chemotherapy.  We further discussed the differences in locoregional recurrence and similar overall survival when compared to mastectomy.  We discussed the various technical aspects of nipple sparing mastectomy and traditional mastectomy.\par \par Finally we discussed the need for sentinel lymph node biopsy and possible axillary dissection and the rates of lymphedema which each of 7-10% and 20-25%.  She understands the need for blue dye or radiotracer.\par \par All questions were answered.

## 2019-09-19 NOTE — PHYSICAL EXAM
[Normocephalic] : normocephalic [Atraumatic] : atraumatic [EOMI] : extra ocular movement intact [PERRL] : pupils equal, round and reactive to light [Sclera nonicteric] : sclera nonicteric [Supple] : supple [No Supraclavicular Adenopathy] : no supraclavicular adenopathy [Examined in the supine and seated position] : examined in the supine and seated position [No dominant masses] : no dominant masses in right breast  [No dominant masses] : no dominant masses left breast [No Nipple Retraction] : no left nipple retraction [No Nipple Discharge] : no left nipple discharge [No Axillary Lymphadenopathy] : no left axillary lymphadenopathy [No Edema] : no edema [No Rashes] : no rashes [No Ulceration] : no ulceration [de-identified] : No supraclavicular or axillary adenopathy. No dominant masses, normal to palpation. Everted nipple without discharge. No skin changes.  [de-identified] : No supraclavicular or axillary adenopathy. 1.5cm mass with is not adherent to the chest wall.  Everted nipple without discharge. No skin changes.

## 2019-09-19 NOTE — ASSESSMENT
[FreeTextEntry1] : 55 yo perimenopausal  female with newly diagnosed triple negative breast cancer 1.5cm of the left breast with DCIS.  We reviewed the pathophysiology and treatment options.  She understands she needs further work up and consultations.\par 1. MRI of the breast\par 2. PET scan\par 3. Consult with Medical Oncology\par 4. Consult with Radiation Oncology\par 5. Consult with Plastic Surgery\par 6. Follow up in 1-2 weeks\par 7. Genetic testing with [facility]

## 2019-09-20 ENCOUNTER — OUTPATIENT (OUTPATIENT)
Dept: OUTPATIENT SERVICES | Facility: HOSPITAL | Age: 54
LOS: 1 days | End: 2019-09-20
Payer: COMMERCIAL

## 2019-09-20 ENCOUNTER — TRANSCRIPTION ENCOUNTER (OUTPATIENT)
Age: 54
End: 2019-09-20

## 2019-09-20 ENCOUNTER — OUTPATIENT (OUTPATIENT)
Dept: OUTPATIENT SERVICES | Facility: HOSPITAL | Age: 54
LOS: 1 days | Discharge: HOME | End: 2019-09-20
Payer: COMMERCIAL

## 2019-09-20 ENCOUNTER — APPOINTMENT (OUTPATIENT)
Dept: MRI IMAGING | Facility: CLINIC | Age: 54
End: 2019-09-20
Payer: COMMERCIAL

## 2019-09-20 DIAGNOSIS — C50.919 MALIGNANT NEOPLASM OF UNSPECIFIED SITE OF UNSPECIFIED FEMALE BREAST: ICD-10-CM

## 2019-09-20 DIAGNOSIS — L73.9 FOLLICULAR DISORDER, UNSPECIFIED: Chronic | ICD-10-CM

## 2019-09-20 DIAGNOSIS — Z98.890 OTHER SPECIFIED POSTPROCEDURAL STATES: Chronic | ICD-10-CM

## 2019-09-20 PROCEDURE — C8937: CPT

## 2019-09-20 PROCEDURE — 77049 MRI BREAST C-+ W/CAD BI: CPT | Mod: 26

## 2019-09-20 PROCEDURE — C8908: CPT

## 2019-09-20 PROCEDURE — A9585: CPT

## 2019-09-23 ENCOUNTER — RESULT REVIEW (OUTPATIENT)
Age: 54
End: 2019-09-23

## 2019-09-23 ENCOUNTER — APPOINTMENT (OUTPATIENT)
Dept: HEMATOLOGY ONCOLOGY | Facility: CLINIC | Age: 54
End: 2019-09-23
Payer: COMMERCIAL

## 2019-09-23 VITALS
TEMPERATURE: 99.1 F | WEIGHT: 240.02 LBS | DIASTOLIC BLOOD PRESSURE: 83 MMHG | BODY MASS INDEX: 34.36 KG/M2 | SYSTOLIC BLOOD PRESSURE: 126 MMHG | HEIGHT: 70 IN | OXYGEN SATURATION: 97 % | HEART RATE: 97 BPM

## 2019-09-23 DIAGNOSIS — L65.8 OTHER SPECIFIED NONSCARRING HAIR LOSS: ICD-10-CM

## 2019-09-23 DIAGNOSIS — T45.1X5A OTHER SPECIFIED NONSCARRING HAIR LOSS: ICD-10-CM

## 2019-09-23 DIAGNOSIS — G47.00 INSOMNIA, UNSPECIFIED: ICD-10-CM

## 2019-09-23 LAB
ALBUMIN SERPL ELPH-MCNC: 4.6 G/DL
ALP BLD-CCNC: 73 U/L
ALT SERPL-CCNC: 27 U/L
ANION GAP SERPL CALC-SCNC: 13 MMOL/L
APTT BLD: 32.1 SEC
AST SERPL-CCNC: 20 U/L
BASOPHILS # BLD AUTO: 0 K/UL — SIGNIFICANT CHANGE UP (ref 0–0.2)
BASOPHILS NFR BLD AUTO: 0.9 % — SIGNIFICANT CHANGE UP (ref 0–2)
BILIRUB SERPL-MCNC: 0.4 MG/DL
BUN SERPL-MCNC: 15 MG/DL
CALCIUM SERPL-MCNC: 9.8 MG/DL
CHLORIDE SERPL-SCNC: 104 MMOL/L
CO2 SERPL-SCNC: 25 MMOL/L
CREAT SERPL-MCNC: 0.82 MG/DL
EOSINOPHIL # BLD AUTO: 0.1 K/UL — SIGNIFICANT CHANGE UP (ref 0–0.5)
EOSINOPHIL NFR BLD AUTO: 1.2 % — SIGNIFICANT CHANGE UP (ref 0–6)
GLUCOSE SERPL-MCNC: 86 MG/DL
HCT VFR BLD CALC: 45.9 % — HIGH (ref 34.5–45)
HGB BLD-MCNC: 14.8 G/DL — SIGNIFICANT CHANGE UP (ref 11.5–15.5)
INR PPP: 0.96 RATIO
LYMPHOCYTES # BLD AUTO: 1.7 K/UL — SIGNIFICANT CHANGE UP (ref 1–3.3)
LYMPHOCYTES # BLD AUTO: 33.2 % — SIGNIFICANT CHANGE UP (ref 13–44)
MCHC RBC-ENTMCNC: 27.7 PG — SIGNIFICANT CHANGE UP (ref 27–34)
MCHC RBC-ENTMCNC: 32.1 G/DL — SIGNIFICANT CHANGE UP (ref 32–36)
MCV RBC AUTO: 86.2 FL — SIGNIFICANT CHANGE UP (ref 80–100)
MONOCYTES # BLD AUTO: 0.5 K/UL — SIGNIFICANT CHANGE UP (ref 0–0.9)
MONOCYTES NFR BLD AUTO: 11 % — SIGNIFICANT CHANGE UP (ref 2–14)
NEUTROPHILS # BLD AUTO: 2.7 K/UL — SIGNIFICANT CHANGE UP (ref 1.8–7.4)
NEUTROPHILS NFR BLD AUTO: 53.6 % — SIGNIFICANT CHANGE UP (ref 43–77)
PLATELET # BLD AUTO: 325 K/UL — SIGNIFICANT CHANGE UP (ref 150–400)
POTASSIUM SERPL-SCNC: 4.8 MMOL/L
PROT SERPL-MCNC: 7.5 G/DL
PT BLD: 10.9 SEC
RBC # BLD: 5.33 M/UL — HIGH (ref 3.8–5.2)
RBC # FLD: 12 % — SIGNIFICANT CHANGE UP (ref 10.3–14.5)
SODIUM SERPL-SCNC: 142 MMOL/L
WBC # BLD: 5 K/UL — SIGNIFICANT CHANGE UP (ref 3.8–10.5)
WBC # FLD AUTO: 5 K/UL — SIGNIFICANT CHANGE UP (ref 3.8–10.5)

## 2019-09-23 PROCEDURE — 99204 OFFICE O/P NEW MOD 45 MIN: CPT

## 2019-09-23 PROCEDURE — 93010 ELECTROCARDIOGRAM REPORT: CPT

## 2019-09-23 NOTE — HISTORY OF PRESENT ILLNESS
[de-identified] : Ms. Pak is a 53 y/o female presenting for an initial consult regarding treatment options for her newly diagnosed left breast CA. She was referred by Dr. Roque to discuss role of chemotherapy. Reports she felt a lump in her left breast on 9/2/19. Mammogram and pathology reveal 1.5 cm poorly differentiated IDC triple negative mass in her left breast. Reports she is anxious and has not been able to sleep for the past week. She has hx of knee arthritis and prolactinoma, followed by endocrinologist. She has fhx stomach cancer (mother), pancreatic cancer (father), and breast cancer (distant cousin). \par \par MRI 9/20/19:\par 1. Biopsy-proven invasive ductal carcinoma/ductal carcinoma in situ measuring up to 4.5 cm the upper central left breast, as above. Additional nonmass enhancement is noted extending from the superior aspect of the mass posteriorly by approximately 3 cm. Of note, enhancement on MRI in this location is significantly larger than reported on outside imagining reports. Wide excision is recommended. Alternatively, MR guided biopsy of additional nonmass enhancement can be performed if will alter management. The patient is advised to submit prior examinations for comparison at which time an addendum to the current be issued. \par 2. No evidence of suspicious enhancement in the contralateral right breast.\par \par Bilateral diagnostic mammogram/ultrasound 9/13/2019\par New 1.5cm solid mass left breast 12:00 Imaging features are indeterminate. No suspicious finding in the right breast. BIRADS 4: Suspicious\par US: Left breast 12:00 7cm FN 1.5cm hypoechoic round mass with slightly angular margins corresponding to the new mass on mammography. Imaging features are indeterminate. BIRADS 4: Suspicious\par \par Pathology 915/19: \par Left breast 12:00 7cm FN\par Poorly differentiated IDC ER- [id number]- [id number] - with DCIS

## 2019-09-23 NOTE — PHYSICAL EXAM
[Normal] : affect appropriate [de-identified] : Left breast mass 12:00 position, 3 cm to palpation.s

## 2019-09-23 NOTE — ASSESSMENT
[FreeTextEntry1] : Triple negative poorly differentiated invasive ductal breast cancer presenting as a rapidly enlarging left breast mass at 12:00.\par 1.5 cm diameter on mammo/sonogram, but 4.5 cm upper central mass as seen on breast MRI.\par -Discussed role of neoadjuvant chemotherapy with Dr. Roque and Nikki - we agreed to initiate chemotherapy with plans for lumpectomy/breast RT. We await the results of genetic testing, and if BRCA positive then mastectomy to be discussed.\par -Discussed neoadjuvant chemotherapy treatment with dose-dense Adriamycin/Cytoxan followed by weekly Taxol \par -Discuss benefits, risks, and side effects of Adriamycin, Cytoxan, and Taxol\par -Baseline echocardiogram ordered \par -Follow up during treatment in conjunction with Dr. Roque to monitor clinical response to AC/T

## 2019-09-24 ENCOUNTER — APPOINTMENT (OUTPATIENT)
Dept: PLASTIC SURGERY | Facility: CLINIC | Age: 54
End: 2019-09-24

## 2019-09-24 ENCOUNTER — FORM ENCOUNTER (OUTPATIENT)
Age: 54
End: 2019-09-24

## 2019-09-24 LAB
HBV CORE IGG+IGM SER QL: NONREACTIVE
HBV SURFACE AB SER QL: NONREACTIVE
HBV SURFACE AG SER QL: NONREACTIVE
HCV AB SER QL: NONREACTIVE
HCV S/CO RATIO: 0.13 S/CO

## 2019-09-25 ENCOUNTER — APPOINTMENT (OUTPATIENT)
Dept: INTERVENTIONAL RADIOLOGY/VASCULAR | Facility: CLINIC | Age: 54
End: 2019-09-25
Payer: COMMERCIAL

## 2019-09-25 ENCOUNTER — OUTPATIENT (OUTPATIENT)
Dept: OUTPATIENT SERVICES | Facility: HOSPITAL | Age: 54
LOS: 1 days | End: 2019-09-25

## 2019-09-25 DIAGNOSIS — Z98.890 OTHER SPECIFIED POSTPROCEDURAL STATES: Chronic | ICD-10-CM

## 2019-09-25 DIAGNOSIS — C50.919 MALIGNANT NEOPLASM OF UNSPECIFIED SITE OF UNSPECIFIED FEMALE BREAST: ICD-10-CM

## 2019-09-25 DIAGNOSIS — L73.9 FOLLICULAR DISORDER, UNSPECIFIED: Chronic | ICD-10-CM

## 2019-09-25 PROCEDURE — 36561 INSERT TUNNELED CV CATH: CPT

## 2019-09-25 PROCEDURE — 77001 FLUOROGUIDE FOR VEIN DEVICE: CPT | Mod: 26

## 2019-09-25 PROCEDURE — 76937 US GUIDE VASCULAR ACCESS: CPT | Mod: 26

## 2019-09-26 ENCOUNTER — RESULT REVIEW (OUTPATIENT)
Age: 54
End: 2019-09-26

## 2019-09-26 PROCEDURE — 88321 CONSLTJ&REPRT SLD PREP ELSWR: CPT

## 2019-10-02 ENCOUNTER — APPOINTMENT (OUTPATIENT)
Dept: CARDIOLOGY | Facility: CLINIC | Age: 54
End: 2019-10-02
Payer: COMMERCIAL

## 2019-10-02 PROCEDURE — 93306 TTE W/DOPPLER COMPLETE: CPT

## 2019-10-02 PROCEDURE — 76376 3D RENDER W/INTRP POSTPROCES: CPT

## 2019-10-02 PROCEDURE — 0399T: CPT

## 2019-10-03 ENCOUNTER — APPOINTMENT (OUTPATIENT)
Dept: OBGYN | Facility: CLINIC | Age: 54
End: 2019-10-03
Payer: COMMERCIAL

## 2019-10-03 VITALS
BODY MASS INDEX: 34.36 KG/M2 | SYSTOLIC BLOOD PRESSURE: 120 MMHG | HEIGHT: 70 IN | DIASTOLIC BLOOD PRESSURE: 70 MMHG | WEIGHT: 240 LBS

## 2019-10-03 DIAGNOSIS — Z80.0 FAMILY HISTORY OF MALIGNANT NEOPLASM OF DIGESTIVE ORGANS: ICD-10-CM

## 2019-10-03 DIAGNOSIS — Z82.49 FAMILY HISTORY OF ISCHEMIC HEART DISEASE AND OTHER DISEASES OF THE CIRCULATORY SYSTEM: ICD-10-CM

## 2019-10-03 PROCEDURE — 99396 PREV VISIT EST AGE 40-64: CPT

## 2019-10-03 NOTE — PHYSICAL EXAM
[Alert] : alert [Awake] : awake [Acute Distress] : no acute distress [Mass] : breast mass [Axillary LAD] : no axillary lymphadenopathy [Nipple Discharge] : no nipple discharge [___cm] : a ~M [unfilled] ~Ucm superior medial quadrant mass was palpated [Tender] : non tender [Soft] : soft [Oriented x3] : oriented to person, place, and time [No Bleeding] : there was no active vaginal bleeding [Normal] : uterus [Uterine Adnexae] : were not tender and not enlarged [No Tenderness] : no rectal tenderness

## 2019-10-04 LAB — HPV HIGH+LOW RISK DNA PNL CVX: DETECTED

## 2019-10-07 ENCOUNTER — APPOINTMENT (OUTPATIENT)
Age: 54
End: 2019-10-07

## 2019-10-07 ENCOUNTER — APPOINTMENT (OUTPATIENT)
Dept: SURGERY | Facility: CLINIC | Age: 54
End: 2019-10-07

## 2019-10-07 ENCOUNTER — LABORATORY RESULT (OUTPATIENT)
Age: 54
End: 2019-10-07

## 2019-10-07 ENCOUNTER — RESULT REVIEW (OUTPATIENT)
Age: 54
End: 2019-10-07

## 2019-10-07 ENCOUNTER — APPOINTMENT (OUTPATIENT)
Dept: HEMATOLOGY ONCOLOGY | Facility: CLINIC | Age: 54
End: 2019-10-07

## 2019-10-07 LAB
BASOPHILS # BLD AUTO: 0.1 K/UL — SIGNIFICANT CHANGE UP (ref 0–0.2)
BASOPHILS NFR BLD AUTO: 1.6 % — SIGNIFICANT CHANGE UP (ref 0–2)
EOSINOPHIL # BLD AUTO: 0.1 K/UL — SIGNIFICANT CHANGE UP (ref 0–0.5)
EOSINOPHIL NFR BLD AUTO: 3.1 % — SIGNIFICANT CHANGE UP (ref 0–6)
HCT VFR BLD CALC: 42.3 % — SIGNIFICANT CHANGE UP (ref 34.5–45)
HGB BLD-MCNC: 14.5 G/DL — SIGNIFICANT CHANGE UP (ref 11.5–15.5)
LYMPHOCYTES # BLD AUTO: 1.4 K/UL — SIGNIFICANT CHANGE UP (ref 1–3.3)
LYMPHOCYTES # BLD AUTO: 34.7 % — SIGNIFICANT CHANGE UP (ref 13–44)
MCHC RBC-ENTMCNC: 28.8 PG — SIGNIFICANT CHANGE UP (ref 27–34)
MCHC RBC-ENTMCNC: 34.4 G/DL — SIGNIFICANT CHANGE UP (ref 32–36)
MCV RBC AUTO: 84 FL — SIGNIFICANT CHANGE UP (ref 80–100)
MONOCYTES # BLD AUTO: 0.3 K/UL — SIGNIFICANT CHANGE UP (ref 0–0.9)
MONOCYTES NFR BLD AUTO: 8 % — SIGNIFICANT CHANGE UP (ref 2–14)
NEUTROPHILS # BLD AUTO: 2.1 K/UL — SIGNIFICANT CHANGE UP (ref 1.8–7.4)
NEUTROPHILS NFR BLD AUTO: 52.6 % — SIGNIFICANT CHANGE UP (ref 43–77)
PLATELET # BLD AUTO: 290 K/UL — SIGNIFICANT CHANGE UP (ref 150–400)
RBC # BLD: 5.04 M/UL — SIGNIFICANT CHANGE UP (ref 3.8–5.2)
RBC # FLD: 12.2 % — SIGNIFICANT CHANGE UP (ref 10.3–14.5)
WBC # BLD: 4.1 K/UL — SIGNIFICANT CHANGE UP (ref 3.8–10.5)
WBC # FLD AUTO: 4.1 K/UL — SIGNIFICANT CHANGE UP (ref 3.8–10.5)

## 2019-10-07 RX ORDER — CABERGOLINE 0.5 MG/1
0.5 TABLET ORAL
Qty: 0 | Refills: 0 | DISCHARGE

## 2019-10-08 DIAGNOSIS — R11.2 NAUSEA WITH VOMITING, UNSPECIFIED: ICD-10-CM

## 2019-10-08 DIAGNOSIS — Z51.11 ENCOUNTER FOR ANTINEOPLASTIC CHEMOTHERAPY: ICD-10-CM

## 2019-10-08 DIAGNOSIS — Z51.89 ENCOUNTER FOR OTHER SPECIFIED AFTERCARE: ICD-10-CM

## 2019-10-10 LAB — CYTOLOGY CVX/VAG DOC THIN PREP: NORMAL

## 2019-10-15 ENCOUNTER — TRANSCRIPTION ENCOUNTER (OUTPATIENT)
Age: 54
End: 2019-10-15

## 2019-10-17 ENCOUNTER — OUTPATIENT (OUTPATIENT)
Dept: OUTPATIENT SERVICES | Facility: HOSPITAL | Age: 54
LOS: 1 days | Discharge: HOME | End: 2019-10-17

## 2019-10-17 DIAGNOSIS — Z98.890 OTHER SPECIFIED POSTPROCEDURAL STATES: Chronic | ICD-10-CM

## 2019-10-17 DIAGNOSIS — C50.919 MALIGNANT NEOPLASM OF UNSPECIFIED SITE OF UNSPECIFIED FEMALE BREAST: ICD-10-CM

## 2019-10-17 DIAGNOSIS — L73.9 FOLLICULAR DISORDER, UNSPECIFIED: Chronic | ICD-10-CM

## 2019-10-21 ENCOUNTER — APPOINTMENT (OUTPATIENT)
Dept: HEMATOLOGY ONCOLOGY | Facility: CLINIC | Age: 54
End: 2019-10-21

## 2019-10-21 ENCOUNTER — APPOINTMENT (OUTPATIENT)
Age: 54
End: 2019-10-21

## 2019-10-23 ENCOUNTER — CHART COPY (OUTPATIENT)
Age: 54
End: 2019-10-23

## 2019-10-24 ENCOUNTER — APPOINTMENT (OUTPATIENT)
Age: 54
End: 2019-10-24

## 2019-10-24 ENCOUNTER — RESULT REVIEW (OUTPATIENT)
Age: 54
End: 2019-10-24

## 2019-10-24 ENCOUNTER — APPOINTMENT (OUTPATIENT)
Dept: HEMATOLOGY ONCOLOGY | Facility: CLINIC | Age: 54
End: 2019-10-24
Payer: COMMERCIAL

## 2019-10-24 ENCOUNTER — LABORATORY RESULT (OUTPATIENT)
Age: 54
End: 2019-10-24

## 2019-10-24 LAB
BASOPHILS # BLD AUTO: 0.1 K/UL — SIGNIFICANT CHANGE UP (ref 0–0.2)
BASOPHILS NFR BLD AUTO: 1.3 % — SIGNIFICANT CHANGE UP (ref 0–2)
EOSINOPHIL # BLD AUTO: 0.1 K/UL — SIGNIFICANT CHANGE UP (ref 0–0.5)
EOSINOPHIL NFR BLD AUTO: 1.3 % — SIGNIFICANT CHANGE UP (ref 0–6)
HCT VFR BLD CALC: 40.2 % — SIGNIFICANT CHANGE UP (ref 34.5–45)
HGB BLD-MCNC: 13.8 G/DL — SIGNIFICANT CHANGE UP (ref 11.5–15.5)
LYMPHOCYTES # BLD AUTO: 2 K/UL — SIGNIFICANT CHANGE UP (ref 1–3.3)
LYMPHOCYTES # BLD AUTO: 24.1 % — SIGNIFICANT CHANGE UP (ref 13–44)
MCHC RBC-ENTMCNC: 29.3 PG — SIGNIFICANT CHANGE UP (ref 27–34)
MCHC RBC-ENTMCNC: 34.4 G/DL — SIGNIFICANT CHANGE UP (ref 32–36)
MCV RBC AUTO: 85 FL — SIGNIFICANT CHANGE UP (ref 80–100)
MONOCYTES # BLD AUTO: 0.8 K/UL — SIGNIFICANT CHANGE UP (ref 0–0.9)
MONOCYTES NFR BLD AUTO: 10 % — SIGNIFICANT CHANGE UP (ref 2–14)
NEUTROPHILS # BLD AUTO: 5.3 K/UL — SIGNIFICANT CHANGE UP (ref 1.8–7.4)
NEUTROPHILS NFR BLD AUTO: 63.3 % — SIGNIFICANT CHANGE UP (ref 43–77)
PLATELET # BLD AUTO: 210 K/UL — SIGNIFICANT CHANGE UP (ref 150–400)
RBC # BLD: 4.73 M/UL — SIGNIFICANT CHANGE UP (ref 3.8–5.2)
RBC # FLD: 12.5 % — SIGNIFICANT CHANGE UP (ref 10.3–14.5)
WBC # BLD: 8.4 K/UL — SIGNIFICANT CHANGE UP (ref 3.8–10.5)
WBC # FLD AUTO: 8.4 K/UL — SIGNIFICANT CHANGE UP (ref 3.8–10.5)

## 2019-10-24 PROCEDURE — 99213 OFFICE O/P EST LOW 20 MIN: CPT

## 2019-10-25 ENCOUNTER — CHART COPY (OUTPATIENT)
Age: 54
End: 2019-10-25

## 2019-10-25 DIAGNOSIS — Z51.89 ENCOUNTER FOR OTHER SPECIFIED AFTERCARE: ICD-10-CM

## 2019-10-25 DIAGNOSIS — Z51.11 ENCOUNTER FOR ANTINEOPLASTIC CHEMOTHERAPY: ICD-10-CM

## 2019-10-25 DIAGNOSIS — R11.2 NAUSEA WITH VOMITING, UNSPECIFIED: ICD-10-CM

## 2019-11-04 ENCOUNTER — APPOINTMENT (OUTPATIENT)
Dept: HEMATOLOGY ONCOLOGY | Facility: CLINIC | Age: 54
End: 2019-11-04

## 2019-11-04 ENCOUNTER — APPOINTMENT (OUTPATIENT)
Age: 54
End: 2019-11-04

## 2019-11-07 ENCOUNTER — APPOINTMENT (OUTPATIENT)
Age: 54
End: 2019-11-07

## 2019-11-07 ENCOUNTER — LABORATORY RESULT (OUTPATIENT)
Age: 54
End: 2019-11-07

## 2019-11-07 ENCOUNTER — RESULT REVIEW (OUTPATIENT)
Age: 54
End: 2019-11-07

## 2019-11-07 ENCOUNTER — APPOINTMENT (OUTPATIENT)
Dept: HEMATOLOGY ONCOLOGY | Facility: CLINIC | Age: 54
End: 2019-11-07
Payer: COMMERCIAL

## 2019-11-07 LAB
BASOPHILS # BLD AUTO: 0.1 K/UL — SIGNIFICANT CHANGE UP (ref 0–0.2)
BASOPHILS NFR BLD AUTO: 0.9 % — SIGNIFICANT CHANGE UP (ref 0–2)
EOSINOPHIL # BLD AUTO: 0.1 K/UL — SIGNIFICANT CHANGE UP (ref 0–0.5)
EOSINOPHIL NFR BLD AUTO: 1 % — SIGNIFICANT CHANGE UP (ref 0–6)
HCT VFR BLD CALC: 40 % — SIGNIFICANT CHANGE UP (ref 34.5–45)
HGB BLD-MCNC: 13 G/DL — SIGNIFICANT CHANGE UP (ref 11.5–15.5)
LYMPHOCYTES # BLD AUTO: 1.8 K/UL — SIGNIFICANT CHANGE UP (ref 1–3.3)
LYMPHOCYTES # BLD AUTO: 17.8 % — SIGNIFICANT CHANGE UP (ref 13–44)
MCHC RBC-ENTMCNC: 28.1 PG — SIGNIFICANT CHANGE UP (ref 27–34)
MCHC RBC-ENTMCNC: 32.6 G/DL — SIGNIFICANT CHANGE UP (ref 32–36)
MCV RBC AUTO: 86 FL — SIGNIFICANT CHANGE UP (ref 80–100)
MONOCYTES # BLD AUTO: 0.7 K/UL — SIGNIFICANT CHANGE UP (ref 0–0.9)
MONOCYTES NFR BLD AUTO: 6.9 % — SIGNIFICANT CHANGE UP (ref 2–14)
NEUTROPHILS # BLD AUTO: 7.3 K/UL — SIGNIFICANT CHANGE UP (ref 1.8–7.4)
NEUTROPHILS NFR BLD AUTO: 73.4 % — SIGNIFICANT CHANGE UP (ref 43–77)
PLATELET # BLD AUTO: 185 K/UL — SIGNIFICANT CHANGE UP (ref 150–400)
RBC # BLD: 4.64 M/UL — SIGNIFICANT CHANGE UP (ref 3.8–5.2)
RBC # FLD: 14 % — SIGNIFICANT CHANGE UP (ref 10.3–14.5)
WBC # BLD: 10 K/UL — SIGNIFICANT CHANGE UP (ref 3.8–10.5)
WBC # FLD AUTO: 10 K/UL — SIGNIFICANT CHANGE UP (ref 3.8–10.5)

## 2019-11-07 PROCEDURE — 99213 OFFICE O/P EST LOW 20 MIN: CPT

## 2019-11-12 NOTE — PHYSICAL EXAM
[Restricted in physically strenuous activity but ambulatory and able to carry out work of a light or sedentary nature] : Status 1- Restricted in physically strenuous activity but ambulatory and able to carry out work of a light or sedentary nature, e.g., light house work, office work [Normal] : affect appropriate [de-identified] : left breast mass

## 2019-11-12 NOTE — ASSESSMENT
[FreeTextEntry1] : Triple negative poorly differentiated invasive ductal breast cancer presenting as a rapidly enlarging left breast mass at 12:00.\par 1.5 cm diameter on mammo/sonogram, but 4.5 cm upper central mass as seen on breast MRI.Initiated chemotherapy with plans for lumpectomy/breast RT.\par -Discussed neoadjuvant chemotherapy treatment with dose-dense Adriamycin/Cytoxan followed by weekly Taxol \par Continue treatment as planned.

## 2019-11-12 NOTE — HISTORY OF PRESENT ILLNESS
[de-identified] : Ms. Pak is a 55 y/o female presenting for an initial consult regarding treatment options for her newly diagnosed left breast CA. She was referred by Dr. Roque to discuss role of chemotherapy. Reports she felt a lump in her left breast on 9/2/19. Mammogram and pathology reveal 1.5 cm poorly differentiated IDC triple negative mass in her left breast.\par \par \par MRI 9/20/19:\par 1. Biopsy-proven invasive ductal carcinoma/ductal carcinoma in situ measuring up to 4.5 cm the upper central left breast, as above. Additional nonmass enhancement is noted extending from the superior aspect of the mass posteriorly by approximately 3 cm. Of note, enhancement on MRI in this location is significantly larger than reported on outside imagining reports. Wide excision is recommended. Alternatively, MR guided biopsy of additional nonmass enhancement can be performed if will alter management. The patient is advised to submit prior examinations for comparison at which time an addendum to the current be issued. \par 2. No evidence of suspicious enhancement in the contralateral right breast.\par \par Bilateral diagnostic mammogram/ultrasound 9/13/2019\par New 1.5cm solid mass left breast 12:00 Imaging features are indeterminate. No suspicious finding in the right breast. BIRADS 4: Suspicious\par US: Left breast 12:00 7cm FN 1.5cm hypoechoic round mass with slightly angular margins corresponding to the new mass on mammography. Imaging features are indeterminate. BIRADS 4: Suspicious\par \par Pathology 915/19: \par Left breast 12:00 7cm FN\par Poorly differentiated IDC ER- [place]- [id number] - with DCIS. \par  [de-identified] : I s in for cycle 3 Cytoxan and Adriamycin today,Has occasional nausea, using ativan with relief.Does not tolerate Compazine

## 2019-11-16 ENCOUNTER — OUTPATIENT (OUTPATIENT)
Dept: OUTPATIENT SERVICES | Facility: HOSPITAL | Age: 54
LOS: 1 days | Discharge: HOME | End: 2019-11-16

## 2019-11-16 DIAGNOSIS — Z98.890 OTHER SPECIFIED POSTPROCEDURAL STATES: Chronic | ICD-10-CM

## 2019-11-16 DIAGNOSIS — C50.919 MALIGNANT NEOPLASM OF UNSPECIFIED SITE OF UNSPECIFIED FEMALE BREAST: ICD-10-CM

## 2019-11-16 DIAGNOSIS — L73.9 FOLLICULAR DISORDER, UNSPECIFIED: Chronic | ICD-10-CM

## 2019-11-18 ENCOUNTER — APPOINTMENT (OUTPATIENT)
Age: 54
End: 2019-11-18

## 2019-11-19 ENCOUNTER — APPOINTMENT (OUTPATIENT)
Dept: HEMATOLOGY ONCOLOGY | Facility: CLINIC | Age: 54
End: 2019-11-19

## 2019-11-21 ENCOUNTER — LABORATORY RESULT (OUTPATIENT)
Age: 54
End: 2019-11-21

## 2019-11-21 ENCOUNTER — APPOINTMENT (OUTPATIENT)
Age: 54
End: 2019-11-21

## 2019-11-21 ENCOUNTER — RESULT REVIEW (OUTPATIENT)
Age: 54
End: 2019-11-21

## 2019-11-21 LAB
BASOPHILS # BLD AUTO: 0.2 K/UL — SIGNIFICANT CHANGE UP (ref 0–0.2)
BASOPHILS NFR BLD AUTO: 1.7 % — SIGNIFICANT CHANGE UP (ref 0–2)
EOSINOPHIL # BLD AUTO: 0.1 K/UL — SIGNIFICANT CHANGE UP (ref 0–0.5)
EOSINOPHIL NFR BLD AUTO: 0.7 % — SIGNIFICANT CHANGE UP (ref 0–6)
HCT VFR BLD CALC: 38.4 % — SIGNIFICANT CHANGE UP (ref 34.5–45)
HGB BLD-MCNC: 12.5 G/DL — SIGNIFICANT CHANGE UP (ref 11.5–15.5)
LYMPHOCYTES # BLD AUTO: 1.6 K/UL — SIGNIFICANT CHANGE UP (ref 1–3.3)
LYMPHOCYTES # BLD AUTO: 12.9 % — LOW (ref 13–44)
MCHC RBC-ENTMCNC: 28.6 PG — SIGNIFICANT CHANGE UP (ref 27–34)
MCHC RBC-ENTMCNC: 32.5 G/DL — SIGNIFICANT CHANGE UP (ref 32–36)
MCV RBC AUTO: 88 FL — SIGNIFICANT CHANGE UP (ref 80–100)
MONOCYTES # BLD AUTO: 1.1 K/UL — HIGH (ref 0–0.9)
MONOCYTES NFR BLD AUTO: 8.8 % — SIGNIFICANT CHANGE UP (ref 2–14)
NEUTROPHILS # BLD AUTO: 9.2 K/UL — HIGH (ref 1.8–7.4)
NEUTROPHILS NFR BLD AUTO: 76 % — SIGNIFICANT CHANGE UP (ref 43–77)
PLATELET # BLD AUTO: 255 K/UL — SIGNIFICANT CHANGE UP (ref 150–400)
RBC # BLD: 4.36 M/UL — SIGNIFICANT CHANGE UP (ref 3.8–5.2)
RBC # FLD: 15.6 % — HIGH (ref 10.3–14.5)
WBC # BLD: 12.1 K/UL — HIGH (ref 3.8–10.5)
WBC # FLD AUTO: 12.1 K/UL — HIGH (ref 3.8–10.5)

## 2019-11-21 NOTE — HISTORY OF PRESENT ILLNESS
[FreeTextEntry1] : I had the pleasure of seeing Nikki Pak in the office for a follow up  breast evaluation secondary to newly diagnosed triple negative left breast cancer..Currently undergoing chemotherapy with Dr Dailey - Cytoxan and Adriamycin  Has occasional nausea, using ativan with relief. \par \par \par Nikki is accompanied by her  Presley. She is a nael 53 yo nulliparous  female who has been in stable overall health.\par \par She palpated a left breast mass in the shower on labor day and underwent diagnostic mammogram/ultrasound 9/13. A biopsy was performed demonstrating poorly differentiated IDC [id number] negative with DCIS measuring 1.5 cm.\par \par Imaging : \par [facility]  - Breast WAIC BI W CAD #\par MRI 9/20/19\par 1. Biopsy-proven invasive ductal carcinoma/ductal carcinoma in situ measuring up to 4.5 cm the upper central left breast, as above. Additional nonmass enhancement is noted extending from the superior aspect of the mass posteriorly by approximately 3 cm. Of note, enhancement on MRI in this location is significantly larger than reported on outside imagining reports. Wide excision is recommended. Alternatively, MR guided biopsy of additional nonmass enhancement can be performed if will alter management. The patient is advised to submit prior examinations for comparison at which time an addendum to the current be issued. \par 2. No evidence of suspicious enhancement in the contralateral right breast.\par \par  [facility]\par Bilateral diagnostic mammogram/ultrasound 9/13/2019\par Impression: New 1.5cm solid mass left breast 12:00 Imaging features are indeterminate. No suspicious finding in the right breast. BIRADS 4: Suspicious\par US: Left breast 12:00 7cm FN 1.5cm hypoechoic round mass with slightly angular margins corresponding to the new mass on mammography. Imaging features are indeterminate. BIRADS 4: Suspicious\par \par Pathology: Left breast 12:00 7cm FN\par Poorly differentiated IDC ER- [id number]- [id number] - with DCIS \par \par We reviewed imaging and pathology. She understands the diagnosis is a 1.5cm triple negative breast cancer of the left breast. We discussed the need for further imaging and +/- biopsy. Clinical examination of the axilla is somewhat limited secondary to previous surgery/skin graft and scarring, however no significant adenopathy on examination.\par \par I\par \par We also discussed the pathophysiology of disease and she understands genetic risk and testing was reviewed and performed.\par \par We also discussed consults with Plastic Surgery, Medical Oncology and Radiation Oncology.\par \par We will obtain authorization for additional studies.\par \par We also discussed surgical treatment options including breast conservation and the need for adjuvant radiation treatment and chemotherapy. We further discussed the differences in locoregional recurrence and similar overall survival when compared to mastectomy. We discussed the various technical aspects of nipple sparing mastectomy and traditional mastectomy.\par \par Finally we discussed the need for sentinel lymph node biopsy and possible axillary dissection and the rates of lymphedema which each of 7-10% and 20-25%. She understands the

## 2019-11-21 NOTE — ASSESSMENT
[FreeTextEntry1] : Abnormal finding on breast imaging (793.89) (R92.8)\par Triple negative malignant neoplasm of breast (174.9) (C50.919)\par Breast mass, left (611.72) (N63.20)\par \par  This is 53 yo perimenopausal  female with newly diagnosed triple negative breast cancer 1.5cm of the left breast with DCIS.  We reviewed the pathophysiology and treatment options. Currently undergoing chemotherapy with Dr Dailey.  She understands she needs further work up and consultation.

## 2019-11-22 DIAGNOSIS — R11.2 NAUSEA WITH VOMITING, UNSPECIFIED: ICD-10-CM

## 2019-11-22 DIAGNOSIS — Z51.11 ENCOUNTER FOR ANTINEOPLASTIC CHEMOTHERAPY: ICD-10-CM

## 2019-11-22 DIAGNOSIS — Z51.89 ENCOUNTER FOR OTHER SPECIFIED AFTERCARE: ICD-10-CM

## 2019-11-25 ENCOUNTER — APPOINTMENT (OUTPATIENT)
Dept: SURGERY | Facility: CLINIC | Age: 54
End: 2019-11-25

## 2019-11-26 ENCOUNTER — APPOINTMENT (OUTPATIENT)
Dept: HEMATOLOGY ONCOLOGY | Facility: CLINIC | Age: 54
End: 2019-11-26

## 2019-11-26 ENCOUNTER — APPOINTMENT (OUTPATIENT)
Dept: HEMATOLOGY ONCOLOGY | Facility: CLINIC | Age: 54
End: 2019-11-26
Payer: COMMERCIAL

## 2019-11-26 VITALS
TEMPERATURE: 99.1 F | HEART RATE: 103 BPM | BODY MASS INDEX: 35.73 KG/M2 | DIASTOLIC BLOOD PRESSURE: 75 MMHG | WEIGHT: 249 LBS | SYSTOLIC BLOOD PRESSURE: 115 MMHG

## 2019-11-26 PROCEDURE — 99214 OFFICE O/P EST MOD 30 MIN: CPT

## 2019-11-26 NOTE — HISTORY OF PRESENT ILLNESS
[de-identified] : Ms. Pak is a 53 y/o female following up for left breast CA. She was referred by Dr. Roque to discuss role of chemotherapy. Reports she felt a lump in her left breast on 9/2/19. Mammogram and pathology reveal 1.5 cm poorly differentiated IDC triple negative mass in her left breast. Reports she is anxious and has not been able to sleep for the past week. She has hx of knee arthritis and prolactinoma, followed by endocrinologist. She has fhx stomach cancer (mother), pancreatic cancer (father), and breast cancer (distant cousin). \par \par MRI 9/20/19:\par 1. Biopsy-proven invasive ductal carcinoma/ductal carcinoma in situ measuring up to 4.5 cm the upper central left breast, as above. Additional nonmass enhancement is noted extending from the superior aspect of the mass posteriorly by approximately 3 cm. Of note, enhancement on MRI in this location is significantly larger than reported on outside imagining reports. Wide excision is recommended. Alternatively, MR guided biopsy of additional nonmass enhancement can be performed if will alter management. The patient is advised to submit prior examinations for comparison at which time an addendum to the current be issued. \par 2. No evidence of suspicious enhancement in the contralateral right breast.\par \par Bilateral diagnostic mammogram/ultrasound 9/13/2019\par New 1.5cm solid mass left breast 12:00 Imaging features are indeterminate. No suspicious finding in the right breast. BIRADS 4: Suspicious\par US: Left breast 12:00 7cm FN 1.5cm hypoechoic round mass with slightly angular margins corresponding to the new mass on mammography. Imaging features are indeterminate. BIRADS 4: Suspicious\par \par Pathology 915/19: \par Left breast 12:00 7cm FN\par Poorly differentiated IDC ER- [id number]- [id number] - with DCIS  [de-identified] : S/p 4 cycles of Adriamycin and Cytoxan. \par States after treatments of A/C, she had fatigue, nausea, headaches, sore mouth, and neuropathy in her fingers.\par Also reports some gas, constipation, diarrhea, and acid reflux.

## 2019-11-26 NOTE — ASSESSMENT
[FreeTextEntry1] : Triple negative poorly differentiated invasive ductal breast cancer presenting as a rapidly enlarging left breast mass at 12:00. 1.5 cm diameter on mammo/sonogram, but 4.5 cm upper central mass as seen on breast MRI.\par S/p 4 cycles of Adriamycin and Cytoxan, Nikki has had an excellent clinical response with reductoin in size of palpable left breast mass\par \par Plan:\par -Starts Taxol weekly x 12\par -Follow up with Dr. Roque, with surgery to be planned following completion of taxol.

## 2019-11-26 NOTE — ADDENDUM
[FreeTextEntry1] : I, Glenn Truong, solely acted as scribe for Dr. Delbert Dailey on 11/26/2019.\par

## 2019-11-27 ENCOUNTER — APPOINTMENT (OUTPATIENT)
Dept: HEMATOLOGY ONCOLOGY | Facility: CLINIC | Age: 54
End: 2019-11-27

## 2019-12-05 ENCOUNTER — RESULT REVIEW (OUTPATIENT)
Age: 54
End: 2019-12-05

## 2019-12-05 ENCOUNTER — APPOINTMENT (OUTPATIENT)
Age: 54
End: 2019-12-05

## 2019-12-05 LAB
BASOPHILS # BLD AUTO: 0.2 K/UL — SIGNIFICANT CHANGE UP (ref 0–0.2)
BASOPHILS NFR BLD AUTO: 1.5 % — SIGNIFICANT CHANGE UP (ref 0–2)
EOSINOPHIL # BLD AUTO: 0.1 K/UL — SIGNIFICANT CHANGE UP (ref 0–0.5)
EOSINOPHIL NFR BLD AUTO: 0.8 % — SIGNIFICANT CHANGE UP (ref 0–6)
HCT VFR BLD CALC: 38.8 % — SIGNIFICANT CHANGE UP (ref 34.5–45)
HGB BLD-MCNC: 12.6 G/DL — SIGNIFICANT CHANGE UP (ref 11.5–15.5)
LYMPHOCYTES # BLD AUTO: 1.4 K/UL — SIGNIFICANT CHANGE UP (ref 1–3.3)
LYMPHOCYTES # BLD AUTO: 11.8 % — LOW (ref 13–44)
MCHC RBC-ENTMCNC: 29.2 PG — SIGNIFICANT CHANGE UP (ref 27–34)
MCHC RBC-ENTMCNC: 32.6 G/DL — SIGNIFICANT CHANGE UP (ref 32–36)
MCV RBC AUTO: 89.8 FL — SIGNIFICANT CHANGE UP (ref 80–100)
MONOCYTES # BLD AUTO: 1.2 K/UL — HIGH (ref 0–0.9)
MONOCYTES NFR BLD AUTO: 10.8 % — SIGNIFICANT CHANGE UP (ref 2–14)
NEUTROPHILS # BLD AUTO: 8.6 K/UL — HIGH (ref 1.8–7.4)
NEUTROPHILS NFR BLD AUTO: 75 % — SIGNIFICANT CHANGE UP (ref 43–77)
PLATELET # BLD AUTO: 218 K/UL — SIGNIFICANT CHANGE UP (ref 150–400)
RBC # BLD: 4.32 M/UL — SIGNIFICANT CHANGE UP (ref 3.8–5.2)
RBC # FLD: 16.6 % — HIGH (ref 10.3–14.5)
WBC # BLD: 11.4 K/UL — HIGH (ref 3.8–10.5)
WBC # FLD AUTO: 11.4 K/UL — HIGH (ref 3.8–10.5)

## 2019-12-06 NOTE — ASSESSMENT
[FreeTextEntry1] : Triple negative breast cancer, today is cycle 2 of Cytoxan and Adriamycin, continue with treatment as planned

## 2019-12-06 NOTE — HISTORY OF PRESENT ILLNESS
[de-identified] : In today for cycle 2 Cytoxan and Adriamycin, has nausea, relieved best with ativan. [de-identified] : Ms. Pak is a 53 y/o female presenting for an initial consult regarding treatment options for her newly diagnosed left breast CA. She was referred by Dr. Roque to discuss role of chemotherapy. Reports she felt a lump in her left breast on 9/2/19. Mammogram and pathology reveal 1.5 cm poorly differentiated IDC triple negative mass in her left breast. Reports she is anxious and has not been able to sleep for the past week. She has hx of knee arthritis and prolactinoma, followed by endocrinologist. She has fhx stomach cancer (mother), pancreatic cancer (father), and breast cancer (distant cousin). \par \par MRI 9/20/19:\par 1. Biopsy-proven invasive ductal carcinoma/ductal carcinoma in situ measuring up to 4.5 cm the upper central left breast, as above. Additional nonmass enhancement is noted extending from the superior aspect of the mass posteriorly by approximately 3 cm. Of note, enhancement on MRI in this location is significantly larger than reported on outside imagining reports. Wide excision is recommended. Alternatively, MR guided biopsy of additional nonmass enhancement can be performed if will alter management. The patient is advised to submit prior examinations for comparison at which time an addendum to the current be issued. \par 2. No evidence of suspicious enhancement in the contralateral right breast.\par \par Bilateral diagnostic mammogram/ultrasound 9/13/2019\par New 1.5cm solid mass left breast 12:00 Imaging features are indeterminate. No suspicious finding in the right breast. BIRADS 4: Suspicious\par US: Left breast 12:00 7cm FN 1.5cm hypoechoic round mass with slightly angular margins corresponding to the new mass on mammography. Imaging features are indeterminate. BIRADS 4: Suspicious\par \par Pathology 915/19: \par Left breast 12:00 7cm FN\par Poorly differentiated IDC ER- MI- [id number] - with DCIS. \par

## 2019-12-12 ENCOUNTER — RESULT REVIEW (OUTPATIENT)
Age: 54
End: 2019-12-12

## 2019-12-12 ENCOUNTER — APPOINTMENT (OUTPATIENT)
Age: 54
End: 2019-12-12

## 2019-12-12 LAB
BASOPHILS # BLD AUTO: 0.1 K/UL — SIGNIFICANT CHANGE UP (ref 0–0.2)
BASOPHILS NFR BLD AUTO: 2.3 % — HIGH (ref 0–2)
EOSINOPHIL # BLD AUTO: 0.1 K/UL — SIGNIFICANT CHANGE UP (ref 0–0.5)
EOSINOPHIL NFR BLD AUTO: 1.5 % — SIGNIFICANT CHANGE UP (ref 0–6)
HCT VFR BLD CALC: 36.9 % — SIGNIFICANT CHANGE UP (ref 34.5–45)
HGB BLD-MCNC: 12 G/DL — SIGNIFICANT CHANGE UP (ref 11.5–15.5)
LYMPHOCYTES # BLD AUTO: 1 K/UL — SIGNIFICANT CHANGE UP (ref 1–3.3)
LYMPHOCYTES # BLD AUTO: 22 % — SIGNIFICANT CHANGE UP (ref 13–44)
MCHC RBC-ENTMCNC: 29 PG — SIGNIFICANT CHANGE UP (ref 27–34)
MCHC RBC-ENTMCNC: 32.6 G/DL — SIGNIFICANT CHANGE UP (ref 32–36)
MCV RBC AUTO: 88.8 FL — SIGNIFICANT CHANGE UP (ref 80–100)
MONOCYTES # BLD AUTO: 0.6 K/UL — SIGNIFICANT CHANGE UP (ref 0–0.9)
MONOCYTES NFR BLD AUTO: 12.3 % — SIGNIFICANT CHANGE UP (ref 2–14)
NEUTROPHILS # BLD AUTO: 2.9 K/UL — SIGNIFICANT CHANGE UP (ref 1.8–7.4)
NEUTROPHILS NFR BLD AUTO: 61.8 % — SIGNIFICANT CHANGE UP (ref 43–77)
PLATELET # BLD AUTO: 374 K/UL — SIGNIFICANT CHANGE UP (ref 150–400)
RBC # BLD: 4.15 M/UL — SIGNIFICANT CHANGE UP (ref 3.8–5.2)
RBC # FLD: 16.4 % — HIGH (ref 10.3–14.5)
WBC # BLD: 4.6 K/UL — SIGNIFICANT CHANGE UP (ref 3.8–10.5)
WBC # FLD AUTO: 4.6 K/UL — SIGNIFICANT CHANGE UP (ref 3.8–10.5)

## 2019-12-16 ENCOUNTER — OUTPATIENT (OUTPATIENT)
Dept: OUTPATIENT SERVICES | Facility: HOSPITAL | Age: 54
LOS: 1 days | Discharge: HOME | End: 2019-12-16

## 2019-12-16 DIAGNOSIS — Z98.890 OTHER SPECIFIED POSTPROCEDURAL STATES: Chronic | ICD-10-CM

## 2019-12-16 DIAGNOSIS — C50.919 MALIGNANT NEOPLASM OF UNSPECIFIED SITE OF UNSPECIFIED FEMALE BREAST: ICD-10-CM

## 2019-12-16 DIAGNOSIS — L73.9 FOLLICULAR DISORDER, UNSPECIFIED: Chronic | ICD-10-CM

## 2019-12-19 ENCOUNTER — RESULT REVIEW (OUTPATIENT)
Age: 54
End: 2019-12-19

## 2019-12-19 ENCOUNTER — APPOINTMENT (OUTPATIENT)
Dept: HEMATOLOGY ONCOLOGY | Facility: CLINIC | Age: 54
End: 2019-12-19
Payer: COMMERCIAL

## 2019-12-19 ENCOUNTER — APPOINTMENT (OUTPATIENT)
Age: 54
End: 2019-12-19

## 2019-12-19 LAB
ANISOCYTOSIS BLD QL: SLIGHT — SIGNIFICANT CHANGE UP
BASOPHILS # BLD AUTO: 0 K/UL — SIGNIFICANT CHANGE UP (ref 0–0.2)
BASOPHILS NFR BLD AUTO: 2 % — SIGNIFICANT CHANGE UP (ref 0–2)
ELLIPTOCYTES BLD QL SMEAR: SLIGHT — SIGNIFICANT CHANGE UP
EOSINOPHIL # BLD AUTO: 0 K/UL — SIGNIFICANT CHANGE UP (ref 0–0.5)
EOSINOPHIL NFR BLD AUTO: 1 % — SIGNIFICANT CHANGE UP (ref 0–6)
HCT VFR BLD CALC: 37.7 % — SIGNIFICANT CHANGE UP (ref 34.5–45)
HGB BLD-MCNC: 11.9 G/DL — SIGNIFICANT CHANGE UP (ref 11.5–15.5)
LYMPHOCYTES # BLD AUTO: 1.2 K/UL — SIGNIFICANT CHANGE UP (ref 1–3.3)
LYMPHOCYTES # BLD AUTO: 32 % — SIGNIFICANT CHANGE UP (ref 13–44)
MACROCYTES BLD QL: SLIGHT — SIGNIFICANT CHANGE UP
MCHC RBC-ENTMCNC: 28.2 PG — SIGNIFICANT CHANGE UP (ref 27–34)
MCHC RBC-ENTMCNC: 31.6 G/DL — LOW (ref 32–36)
MCV RBC AUTO: 89.2 FL — SIGNIFICANT CHANGE UP (ref 80–100)
MICROCYTES BLD QL: SLIGHT — SIGNIFICANT CHANGE UP
MONOCYTES # BLD AUTO: 0.5 K/UL — SIGNIFICANT CHANGE UP (ref 0–0.9)
MONOCYTES NFR BLD AUTO: 24 % — HIGH (ref 2–14)
NEUTROPHILS # BLD AUTO: 1 K/UL — LOW (ref 1.8–7.4)
NEUTROPHILS NFR BLD AUTO: 41 % — LOW (ref 43–77)
PLAT MORPH BLD: NORMAL — SIGNIFICANT CHANGE UP
PLATELET # BLD AUTO: 328 K/UL — SIGNIFICANT CHANGE UP (ref 150–400)
POIKILOCYTOSIS BLD QL AUTO: SLIGHT — SIGNIFICANT CHANGE UP
RBC # BLD: 4.22 M/UL — SIGNIFICANT CHANGE UP (ref 3.8–5.2)
RBC # FLD: 16.1 % — HIGH (ref 10.3–14.5)
RBC BLD AUTO: SIGNIFICANT CHANGE UP
WBC # BLD: 2.8 K/UL — LOW (ref 3.8–10.5)
WBC # FLD AUTO: 2.8 K/UL — LOW (ref 3.8–10.5)

## 2019-12-19 PROCEDURE — 99213 OFFICE O/P EST LOW 20 MIN: CPT

## 2019-12-19 NOTE — ASSESSMENT
[FreeTextEntry1] : Triple negative poorly differentiated invasive ductal breast cancer presenting as a rapidly enlarging left breast mass at 12:00. 1.5 cm diameter on mammo/sonogram, but 4.5 cm upper central mass as seen on breast MRI.\par S/p 4 cycles of Adriamycin and Cytoxan now in [place] 3 [place] [address], Ok to treat as per Dr. Dailey, , Nikki has had an excellent clinical response with reduction in size of palpable left breast mass, Continue with treatment as planned.

## 2019-12-19 NOTE — REASON FOR VISIT
[Follow-Up Visit] : a follow-up [Spouse] : spouse [FreeTextEntry2] : Triple negative poorly differentiated invasive ductal breast cance

## 2019-12-19 NOTE — PHYSICAL EXAM
[Fully active, able to carry on all pre-disease performance without restriction] : Status 0 - Fully active, able to carry on all pre-disease performance without restriction [Normal] : grossly intact [de-identified] : not examined

## 2019-12-19 NOTE — HISTORY OF PRESENT ILLNESS
[de-identified] : Triple negative poorly differentiated invasive ductal breast cancer presenting as a rapidly enlarging left breast mass at 12:00. 1.5 cm diameter on mammo/sonogram, but 4.5 cm upper central mass as seen on breast MRI.started on chemotherapy, 4 cycles of Adriamycin and Cytoxan, followed by weekly Taxol x 12 weeks [de-identified] : Today is week 3 of Taxol, , had noticed back pain for a while after last treatment, thought is that it may be due to repetitive motion. Had a URI, which is resolving, no fevers., has noticed some hair loss, but some hair on scalp is growing in curly. ( using a cold cap)

## 2019-12-20 DIAGNOSIS — R11.2 NAUSEA WITH VOMITING, UNSPECIFIED: ICD-10-CM

## 2019-12-20 DIAGNOSIS — Z51.11 ENCOUNTER FOR ANTINEOPLASTIC CHEMOTHERAPY: ICD-10-CM

## 2019-12-26 ENCOUNTER — APPOINTMENT (OUTPATIENT)
Age: 54
End: 2019-12-26

## 2019-12-26 ENCOUNTER — RESULT REVIEW (OUTPATIENT)
Age: 54
End: 2019-12-26

## 2019-12-26 ENCOUNTER — LABORATORY RESULT (OUTPATIENT)
Age: 54
End: 2019-12-26

## 2019-12-26 LAB
BASOPHILS # BLD AUTO: 0.1 K/UL — SIGNIFICANT CHANGE UP (ref 0–0.2)
BASOPHILS NFR BLD AUTO: 1.6 % — SIGNIFICANT CHANGE UP (ref 0–2)
EOSINOPHIL # BLD AUTO: 0.1 K/UL — SIGNIFICANT CHANGE UP (ref 0–0.5)
EOSINOPHIL NFR BLD AUTO: 2.6 % — SIGNIFICANT CHANGE UP (ref 0–6)
HCT VFR BLD CALC: 35.9 % — SIGNIFICANT CHANGE UP (ref 34.5–45)
HGB BLD-MCNC: 11.4 G/DL — LOW (ref 11.5–15.5)
LYMPHOCYTES # BLD AUTO: 1.1 K/UL — SIGNIFICANT CHANGE UP (ref 1–3.3)
LYMPHOCYTES # BLD AUTO: 21.3 % — SIGNIFICANT CHANGE UP (ref 13–44)
MCHC RBC-ENTMCNC: 29 PG — SIGNIFICANT CHANGE UP (ref 27–34)
MCHC RBC-ENTMCNC: 31.8 G/DL — LOW (ref 32–36)
MCV RBC AUTO: 91.1 FL — SIGNIFICANT CHANGE UP (ref 80–100)
MONOCYTES # BLD AUTO: 0.7 K/UL — SIGNIFICANT CHANGE UP (ref 0–0.9)
MONOCYTES NFR BLD AUTO: 13.7 % — SIGNIFICANT CHANGE UP (ref 2–14)
NEUTROPHILS # BLD AUTO: 3.2 K/UL — SIGNIFICANT CHANGE UP (ref 1.8–7.4)
NEUTROPHILS NFR BLD AUTO: 60.9 % — SIGNIFICANT CHANGE UP (ref 43–77)
PLATELET # BLD AUTO: 391 K/UL — SIGNIFICANT CHANGE UP (ref 150–400)
RBC # BLD: 3.94 M/UL — SIGNIFICANT CHANGE UP (ref 3.8–5.2)
RBC # FLD: 16 % — HIGH (ref 10.3–14.5)
WBC # BLD: 5.2 K/UL — SIGNIFICANT CHANGE UP (ref 3.8–10.5)
WBC # FLD AUTO: 5.2 K/UL — SIGNIFICANT CHANGE UP (ref 3.8–10.5)

## 2020-01-02 ENCOUNTER — FORM ENCOUNTER (OUTPATIENT)
Age: 55
End: 2020-01-02

## 2020-01-02 ENCOUNTER — RESULT REVIEW (OUTPATIENT)
Age: 55
End: 2020-01-02

## 2020-01-02 ENCOUNTER — APPOINTMENT (OUTPATIENT)
Age: 55
End: 2020-01-02

## 2020-01-02 LAB
BASOPHILS # BLD AUTO: 0.1 K/UL — SIGNIFICANT CHANGE UP (ref 0–0.2)
BASOPHILS NFR BLD AUTO: 1.3 % — SIGNIFICANT CHANGE UP (ref 0–2)
EOSINOPHIL # BLD AUTO: 0.1 K/UL — SIGNIFICANT CHANGE UP (ref 0–0.5)
EOSINOPHIL NFR BLD AUTO: 1.6 % — SIGNIFICANT CHANGE UP (ref 0–6)
HCT VFR BLD CALC: 37.3 % — SIGNIFICANT CHANGE UP (ref 34.5–45)
HGB BLD-MCNC: 12.2 G/DL — SIGNIFICANT CHANGE UP (ref 11.5–15.5)
LYMPHOCYTES # BLD AUTO: 1.1 K/UL — SIGNIFICANT CHANGE UP (ref 1–3.3)
LYMPHOCYTES # BLD AUTO: 18.9 % — SIGNIFICANT CHANGE UP (ref 13–44)
MCHC RBC-ENTMCNC: 29.8 PG — SIGNIFICANT CHANGE UP (ref 27–34)
MCHC RBC-ENTMCNC: 32.9 G/DL — SIGNIFICANT CHANGE UP (ref 32–36)
MCV RBC AUTO: 90.7 FL — SIGNIFICANT CHANGE UP (ref 80–100)
MONOCYTES # BLD AUTO: 0.4 K/UL — SIGNIFICANT CHANGE UP (ref 0–0.9)
MONOCYTES NFR BLD AUTO: 7.6 % — SIGNIFICANT CHANGE UP (ref 2–14)
NEUTROPHILS # BLD AUTO: 4.2 K/UL — SIGNIFICANT CHANGE UP (ref 1.8–7.4)
NEUTROPHILS NFR BLD AUTO: 70.6 % — SIGNIFICANT CHANGE UP (ref 43–77)
PLATELET # BLD AUTO: 302 K/UL — SIGNIFICANT CHANGE UP (ref 150–400)
RBC # BLD: 4.11 M/UL — SIGNIFICANT CHANGE UP (ref 3.8–5.2)
RBC # FLD: 15.4 % — HIGH (ref 10.3–14.5)
WBC # BLD: 5.9 K/UL — SIGNIFICANT CHANGE UP (ref 3.8–10.5)
WBC # FLD AUTO: 5.9 K/UL — SIGNIFICANT CHANGE UP (ref 3.8–10.5)

## 2020-01-03 ENCOUNTER — OUTPATIENT (OUTPATIENT)
Dept: OUTPATIENT SERVICES | Facility: HOSPITAL | Age: 55
LOS: 1 days | End: 2020-01-03
Payer: COMMERCIAL

## 2020-01-03 ENCOUNTER — APPOINTMENT (OUTPATIENT)
Dept: MRI IMAGING | Facility: CLINIC | Age: 55
End: 2020-01-03
Payer: COMMERCIAL

## 2020-01-03 DIAGNOSIS — C50.919 MALIGNANT NEOPLASM OF UNSPECIFIED SITE OF UNSPECIFIED FEMALE BREAST: ICD-10-CM

## 2020-01-03 DIAGNOSIS — Z98.890 OTHER SPECIFIED POSTPROCEDURAL STATES: Chronic | ICD-10-CM

## 2020-01-03 DIAGNOSIS — L73.9 FOLLICULAR DISORDER, UNSPECIFIED: Chronic | ICD-10-CM

## 2020-01-03 PROCEDURE — 77049 MRI BREAST C-+ W/CAD BI: CPT | Mod: 26

## 2020-01-03 PROCEDURE — C8906: CPT

## 2020-01-03 PROCEDURE — C8937: CPT

## 2020-01-03 PROCEDURE — A9585: CPT

## 2020-01-07 ENCOUNTER — APPOINTMENT (OUTPATIENT)
Dept: SURGERY | Facility: CLINIC | Age: 55
End: 2020-01-07

## 2020-01-09 ENCOUNTER — RESULT REVIEW (OUTPATIENT)
Age: 55
End: 2020-01-09

## 2020-01-09 ENCOUNTER — APPOINTMENT (OUTPATIENT)
Age: 55
End: 2020-01-09

## 2020-01-09 LAB
BASOPHILS # BLD AUTO: 0.1 K/UL — SIGNIFICANT CHANGE UP (ref 0–0.2)
BASOPHILS NFR BLD AUTO: 2.3 % — HIGH (ref 0–2)
EOSINOPHIL # BLD AUTO: 0.1 K/UL — SIGNIFICANT CHANGE UP (ref 0–0.5)
EOSINOPHIL NFR BLD AUTO: 2.2 % — SIGNIFICANT CHANGE UP (ref 0–6)
HCT VFR BLD CALC: 37.2 % — SIGNIFICANT CHANGE UP (ref 34.5–45)
HGB BLD-MCNC: 12.3 G/DL — SIGNIFICANT CHANGE UP (ref 11.5–15.5)
LYMPHOCYTES # BLD AUTO: 0.9 K/UL — LOW (ref 1–3.3)
LYMPHOCYTES # BLD AUTO: 26.3 % — SIGNIFICANT CHANGE UP (ref 13–44)
MCHC RBC-ENTMCNC: 30 PG — SIGNIFICANT CHANGE UP (ref 27–34)
MCHC RBC-ENTMCNC: 33 G/DL — SIGNIFICANT CHANGE UP (ref 32–36)
MCV RBC AUTO: 91.1 FL — SIGNIFICANT CHANGE UP (ref 80–100)
MONOCYTES # BLD AUTO: 0.3 K/UL — SIGNIFICANT CHANGE UP (ref 0–0.9)
MONOCYTES NFR BLD AUTO: 7.7 % — SIGNIFICANT CHANGE UP (ref 2–14)
NEUTROPHILS # BLD AUTO: 2.2 K/UL — SIGNIFICANT CHANGE UP (ref 1.8–7.4)
NEUTROPHILS NFR BLD AUTO: 61.4 % — SIGNIFICANT CHANGE UP (ref 43–77)
PLATELET # BLD AUTO: 313 K/UL — SIGNIFICANT CHANGE UP (ref 150–400)
RBC # BLD: 4.08 M/UL — SIGNIFICANT CHANGE UP (ref 3.8–5.2)
RBC # FLD: 15.6 % — HIGH (ref 10.3–14.5)
WBC # BLD: 3.6 K/UL — LOW (ref 3.8–10.5)
WBC # FLD AUTO: 3.6 K/UL — LOW (ref 3.8–10.5)

## 2020-01-14 ENCOUNTER — OUTPATIENT (OUTPATIENT)
Dept: OUTPATIENT SERVICES | Facility: HOSPITAL | Age: 55
LOS: 1 days | Discharge: HOME | End: 2020-01-14

## 2020-01-14 DIAGNOSIS — Z98.890 OTHER SPECIFIED POSTPROCEDURAL STATES: Chronic | ICD-10-CM

## 2020-01-14 DIAGNOSIS — L73.9 FOLLICULAR DISORDER, UNSPECIFIED: Chronic | ICD-10-CM

## 2020-01-14 DIAGNOSIS — C50.919 MALIGNANT NEOPLASM OF UNSPECIFIED SITE OF UNSPECIFIED FEMALE BREAST: ICD-10-CM

## 2020-01-16 ENCOUNTER — RESULT REVIEW (OUTPATIENT)
Age: 55
End: 2020-01-16

## 2020-01-16 ENCOUNTER — APPOINTMENT (OUTPATIENT)
Age: 55
End: 2020-01-16

## 2020-01-16 ENCOUNTER — LABORATORY RESULT (OUTPATIENT)
Age: 55
End: 2020-01-16

## 2020-01-16 ENCOUNTER — APPOINTMENT (OUTPATIENT)
Dept: HEMATOLOGY ONCOLOGY | Facility: CLINIC | Age: 55
End: 2020-01-16
Payer: COMMERCIAL

## 2020-01-16 VITALS — BODY MASS INDEX: 36.88 KG/M2 | WEIGHT: 257 LBS

## 2020-01-16 LAB
BASOPHILS # BLD AUTO: 0.1 K/UL — SIGNIFICANT CHANGE UP (ref 0–0.2)
BASOPHILS NFR BLD AUTO: 1.5 % — SIGNIFICANT CHANGE UP (ref 0–2)
EOSINOPHIL # BLD AUTO: 0.1 K/UL — SIGNIFICANT CHANGE UP (ref 0–0.5)
EOSINOPHIL NFR BLD AUTO: 2.2 % — SIGNIFICANT CHANGE UP (ref 0–6)
HCT VFR BLD CALC: 38.5 % — SIGNIFICANT CHANGE UP (ref 34.5–45)
HGB BLD-MCNC: 12.4 G/DL — SIGNIFICANT CHANGE UP (ref 11.5–15.5)
LYMPHOCYTES # BLD AUTO: 1.2 K/UL — SIGNIFICANT CHANGE UP (ref 1–3.3)
LYMPHOCYTES # BLD AUTO: 33.4 % — SIGNIFICANT CHANGE UP (ref 13–44)
MCHC RBC-ENTMCNC: 29.7 PG — SIGNIFICANT CHANGE UP (ref 27–34)
MCHC RBC-ENTMCNC: 32.2 G/DL — SIGNIFICANT CHANGE UP (ref 32–36)
MCV RBC AUTO: 92.1 FL — SIGNIFICANT CHANGE UP (ref 80–100)
MONOCYTES # BLD AUTO: 0.4 K/UL — SIGNIFICANT CHANGE UP (ref 0–0.9)
MONOCYTES NFR BLD AUTO: 12.6 % — SIGNIFICANT CHANGE UP (ref 2–14)
NEUTROPHILS # BLD AUTO: 1.8 K/UL — SIGNIFICANT CHANGE UP (ref 1.8–7.4)
NEUTROPHILS NFR BLD AUTO: 50.3 % — SIGNIFICANT CHANGE UP (ref 43–77)
PLATELET # BLD AUTO: 275 K/UL — SIGNIFICANT CHANGE UP (ref 150–400)
RBC # BLD: 4.18 M/UL — SIGNIFICANT CHANGE UP (ref 3.8–5.2)
RBC # FLD: 15 % — HIGH (ref 10.3–14.5)
WBC # BLD: 3.6 K/UL — LOW (ref 3.8–10.5)
WBC # FLD AUTO: 3.6 K/UL — LOW (ref 3.8–10.5)

## 2020-01-16 PROCEDURE — 99214 OFFICE O/P EST MOD 30 MIN: CPT

## 2020-01-17 DIAGNOSIS — R11.2 NAUSEA WITH VOMITING, UNSPECIFIED: ICD-10-CM

## 2020-01-17 DIAGNOSIS — Z51.11 ENCOUNTER FOR ANTINEOPLASTIC CHEMOTHERAPY: ICD-10-CM

## 2020-01-20 NOTE — ASSESSMENT
[FreeTextEntry1] : Triple negative poorly differentiated invasive ductal breast cancer presenting as a rapidly enlarging left breast mass at 12:00. 1.5 cm diameter on mammo/sonogram, but 4.5 cm upper central mass as seen on breast MRI.\par S/p 4 cycles of Adriamycin and Cytoxan, Nikki has had an excellent clinical response with reduction in size of palpable left breast mass and markedly improved breast MRI\par \par Plan:\par -Complete Taxol weekly x 12\par -Surgery with Dr. Roque following completion of successful neoadjuvant chemotherapy.

## 2020-01-20 NOTE — PHYSICAL EXAM
[Normal] : affect appropriate [de-identified] : Left breast mass 12:00 position, barely palpable

## 2020-01-20 NOTE — HISTORY OF PRESENT ILLNESS
[Disease: _____________________] : Disease: [unfilled] [de-identified] : Ms. Pak is a 55 y/o female following up for left breast CA. She was referred by Dr. Roque to discuss role of chemotherapy. Reports she felt a lump in her left breast on 9/2/19. Mammogram and pathology reveal 1.5 cm poorly differentiated IDC triple negative mass in her left breast. Reports she is anxious and has not been able to sleep for the past week. She has hx of knee arthritis and prolactinoma, followed by endocrinologist. She has fhx stomach cancer (mother), pancreatic cancer (father), and breast cancer (distant cousin). \par \par MRI Breast 1/3/2020:\par -Near complete resolution of enhancement/mass at the 12:00 position of the left breast.\par -Nonspecific 4 mm enhancing focus within the left nipple. If it would alter management, consider surgical sampling for further evaluation. This is not amenable to image guided biopsy. \par \par MRI 9/20/19:\par 1. Biopsy-proven invasive ductal carcinoma/ductal carcinoma in situ measuring up to 4.5 cm the upper central left breast, as above. Additional nonmass enhancement is noted extending from the superior aspect of the mass posteriorly by approximately 3 cm. Of note, enhancement on MRI in this location is significantly larger than reported on outside imagining reports. Wide excision is recommended. Alternatively, MR guided biopsy of additional nonmass enhancement can be performed if will alter management. The patient is advised to submit prior examinations for comparison at which time an addendum to the current be issued. \par 2. No evidence of suspicious enhancement in the contralateral right breast.\par \par Bilateral diagnostic mammogram/ultrasound 9/13/2019\par New 1.5cm solid mass left breast 12:00 Imaging features are indeterminate. No suspicious finding in the right breast. BIRADS 4: Suspicious\par US: Left breast 12:00 7cm FN 1.5cm hypoechoic round mass with slightly angular margins corresponding to the new mass on mammography. Imaging features are indeterminate. BIRADS 4: Suspicious\par \par Pathology 915/19: \par Left breast 12:00 7cm FN\par Poorly differentiated IDC ER- MI- [id number] - with DCIS  [de-identified] : IDC [de-identified] : ER-, [place]-, HER-2- [de-identified] : Feels well today.\par Has not has any side effects from the taxol. \par Denies any neuropathy.

## 2020-01-24 ENCOUNTER — RESULT REVIEW (OUTPATIENT)
Age: 55
End: 2020-01-24

## 2020-01-24 ENCOUNTER — APPOINTMENT (OUTPATIENT)
Age: 55
End: 2020-01-24

## 2020-01-24 LAB
BASOPHILS # BLD AUTO: 0.1 K/UL — SIGNIFICANT CHANGE UP (ref 0–0.2)
BASOPHILS NFR BLD AUTO: 1.6 % — SIGNIFICANT CHANGE UP (ref 0–2)
EOSINOPHIL # BLD AUTO: 0.1 K/UL — SIGNIFICANT CHANGE UP (ref 0–0.5)
EOSINOPHIL NFR BLD AUTO: 1.8 % — SIGNIFICANT CHANGE UP (ref 0–6)
HCT VFR BLD CALC: 36.7 % — SIGNIFICANT CHANGE UP (ref 34.5–45)
HGB BLD-MCNC: 12 G/DL — SIGNIFICANT CHANGE UP (ref 11.5–15.5)
LYMPHOCYTES # BLD AUTO: 1 K/UL — SIGNIFICANT CHANGE UP (ref 1–3.3)
LYMPHOCYTES # BLD AUTO: 26.8 % — SIGNIFICANT CHANGE UP (ref 13–44)
MCHC RBC-ENTMCNC: 30 PG — SIGNIFICANT CHANGE UP (ref 27–34)
MCHC RBC-ENTMCNC: 32.6 G/DL — SIGNIFICANT CHANGE UP (ref 32–36)
MCV RBC AUTO: 92.1 FL — SIGNIFICANT CHANGE UP (ref 80–100)
MONOCYTES # BLD AUTO: 0.4 K/UL — SIGNIFICANT CHANGE UP (ref 0–0.9)
MONOCYTES NFR BLD AUTO: 10.7 % — SIGNIFICANT CHANGE UP (ref 2–14)
NEUTROPHILS # BLD AUTO: 2.3 K/UL — SIGNIFICANT CHANGE UP (ref 1.8–7.4)
NEUTROPHILS NFR BLD AUTO: 59.1 % — SIGNIFICANT CHANGE UP (ref 43–77)
PLATELET # BLD AUTO: 367 K/UL — SIGNIFICANT CHANGE UP (ref 150–400)
RBC # BLD: 3.99 M/UL — SIGNIFICANT CHANGE UP (ref 3.8–5.2)
RBC # FLD: 14.9 % — HIGH (ref 10.3–14.5)
WBC # BLD: 3.8 K/UL — SIGNIFICANT CHANGE UP (ref 3.8–10.5)
WBC # FLD AUTO: 3.8 K/UL — SIGNIFICANT CHANGE UP (ref 3.8–10.5)

## 2020-01-28 ENCOUNTER — APPOINTMENT (OUTPATIENT)
Dept: SURGERY | Facility: CLINIC | Age: 55
End: 2020-01-28
Payer: COMMERCIAL

## 2020-01-28 VITALS
SYSTOLIC BLOOD PRESSURE: 132 MMHG | WEIGHT: 257 LBS | BODY MASS INDEX: 36.79 KG/M2 | HEIGHT: 70 IN | DIASTOLIC BLOOD PRESSURE: 81 MMHG | OXYGEN SATURATION: 100 % | HEART RATE: 110 BPM | TEMPERATURE: 98.1 F

## 2020-01-28 PROCEDURE — 99214 OFFICE O/P EST MOD 30 MIN: CPT

## 2020-01-30 ENCOUNTER — APPOINTMENT (OUTPATIENT)
Age: 55
End: 2020-01-30

## 2020-01-30 ENCOUNTER — RESULT REVIEW (OUTPATIENT)
Age: 55
End: 2020-01-30

## 2020-01-30 LAB
ANISOCYTOSIS BLD QL: SLIGHT — SIGNIFICANT CHANGE UP
BASOPHILS # BLD AUTO: 0.1 K/UL — SIGNIFICANT CHANGE UP (ref 0–0.2)
BASOPHILS NFR BLD AUTO: 2 % — SIGNIFICANT CHANGE UP (ref 0–2)
ELLIPTOCYTES BLD QL SMEAR: SLIGHT — SIGNIFICANT CHANGE UP
EOSINOPHIL # BLD AUTO: 0.1 K/UL — SIGNIFICANT CHANGE UP (ref 0–0.5)
EOSINOPHIL NFR BLD AUTO: 1 % — SIGNIFICANT CHANGE UP (ref 0–6)
HCT VFR BLD CALC: 39.3 % — SIGNIFICANT CHANGE UP (ref 34.5–45)
HGB BLD-MCNC: 13.3 G/DL — SIGNIFICANT CHANGE UP (ref 11.5–15.5)
HYPOCHROMIA BLD QL: SLIGHT — SIGNIFICANT CHANGE UP
LYMPHOCYTES # BLD AUTO: 1 K/UL — SIGNIFICANT CHANGE UP (ref 1–3.3)
LYMPHOCYTES # BLD AUTO: 38 % — SIGNIFICANT CHANGE UP (ref 13–44)
MACROCYTES BLD QL: SLIGHT — SIGNIFICANT CHANGE UP
MCHC RBC-ENTMCNC: 31 PG — SIGNIFICANT CHANGE UP (ref 27–34)
MCHC RBC-ENTMCNC: 33.9 G/DL — SIGNIFICANT CHANGE UP (ref 32–36)
MCV RBC AUTO: 91.4 FL — SIGNIFICANT CHANGE UP (ref 80–100)
MICROCYTES BLD QL: SLIGHT — SIGNIFICANT CHANGE UP
MONOCYTES # BLD AUTO: 0.3 K/UL — SIGNIFICANT CHANGE UP (ref 0–0.9)
MONOCYTES NFR BLD AUTO: 10 % — SIGNIFICANT CHANGE UP (ref 2–14)
NEUTROPHILS # BLD AUTO: 1.7 K/UL — LOW (ref 1.8–7.4)
NEUTROPHILS NFR BLD AUTO: 48 % — SIGNIFICANT CHANGE UP (ref 43–77)
OVALOCYTES BLD QL SMEAR: SLIGHT — SIGNIFICANT CHANGE UP
PLAT MORPH BLD: NORMAL — SIGNIFICANT CHANGE UP
PLATELET # BLD AUTO: 277 K/UL — SIGNIFICANT CHANGE UP (ref 150–400)
POIKILOCYTOSIS BLD QL AUTO: SLIGHT — SIGNIFICANT CHANGE UP
POLYCHROMASIA BLD QL SMEAR: SLIGHT — SIGNIFICANT CHANGE UP
RBC # BLD: 4.3 M/UL — SIGNIFICANT CHANGE UP (ref 3.8–5.2)
RBC # FLD: 14.3 % — SIGNIFICANT CHANGE UP (ref 10.3–14.5)
RBC BLD AUTO: SIGNIFICANT CHANGE UP
VARIANT LYMPHS # BLD: 1 % — SIGNIFICANT CHANGE UP (ref 0–6)
WBC # BLD: 3.1 K/UL — LOW (ref 3.8–10.5)
WBC # FLD AUTO: 3.1 K/UL — LOW (ref 3.8–10.5)

## 2020-02-01 NOTE — PHYSICAL EXAM
[Atraumatic] : atraumatic [Normocephalic] : normocephalic [EOMI] : extra ocular movement intact [Sclera nonicteric] : sclera nonicteric [PERRL] : pupils equal, round and reactive to light [No Supraclavicular Adenopathy] : no supraclavicular adenopathy [Examined in the supine and seated position] : examined in the supine and seated position [Supple] : supple [No dominant masses] : no dominant masses left breast [No Nipple Retraction] : no left nipple retraction [No dominant masses] : no dominant masses in right breast  [No Nipple Discharge] : no left nipple discharge [No Axillary Lymphadenopathy] : no left axillary lymphadenopathy [No Rashes] : no rashes [No Edema] : no edema [No Ulceration] : no ulceration [de-identified] : No supraclavicular or axillary adenopathy. No dominant masses, normal to palpation. Everted nipple without discharge. No skin changes.\par \par  [de-identified] : No supraclavicular or axillary adenopathy. No dominant masses, normal to palpation. Everted nipple without discharge. No skin changes.\par \par

## 2020-02-01 NOTE — HISTORY OF PRESENT ILLNESS
[FreeTextEntry1] : I had the pleasure of seeing Nikki Pak in the office for a follow up  breast evaluation secondary to newly diagnosed triple negative left breast cancer..Currently undergoing chemotherapy with Dr Dailey - Cytoxan and Adriamycin  Has occasional nausea, using ativan with relief. \par \par \par Nikki is accompanied by her  Presley. She is a nael 53 yo nulliparous  female who has been in stable overall health.\par \par She palpated a left breast mass in the shower on labor day and underwent diagnostic mammogram/ultrasound 9/13. A biopsy was performed demonstrating poorly differentiated IDC [id number] negative with DCIS measuring 1.5 cm.\par \par Imaging : \par [facility]  -MR Breast WAIC BI W CAD #\par MRI 9/20/19\par 1. Biopsy-proven invasive ductal carcinoma/ductal carcinoma in situ measuring up to 4.5 cm the upper central left breast, as above. Additional nonmass enhancement is noted extending from the superior aspect of the mass posteriorly by approximately 3 cm. Of note, enhancement on MRI in this location is significantly larger than reported on outside imagining reports. Wide excision is recommended. Alternatively, MR guided biopsy of additional nonmass enhancement can be performed if will alter management. The patient is advised to submit prior examinations for comparison at which time an addendum to the current be issued. \par 2. No evidence of suspicious enhancement in the contralateral right breast.\par \par  [facility]\par Bilateral diagnostic mammogram/ultrasound 9/13/2019\par Impression: New 1.5cm solid mass left breast 12:00 Imaging features are indeterminate. No suspicious finding in the right breast. BIRADS 4: Suspicious\par US: Left breast 12:00 7cm FN 1.5cm hypoechoic round mass with slightly angular margins corresponding to the new mass on mammography. Imaging features are indeterminate. BIRADS 4: Suspicious\par \par Pathology: Left breast 12:00 7cm FN\par Poorly differentiated IDC ER- [place]- [id number] - with DCIS \par \par MIDSTAGING MRI BREAST:\par 1/3/202  MR breast IC BI CAD\par Impression:  Near complete resolution of enhancement/mass at the 12:00 position of the left breast.  Nonspecific 4 mm enhancing focus within the left nipple.  If it would alter management, consider surgical sampling for further evaluation.  This is not amenable to image guided biopsy.  BIRADS 4A low suspicion.\par \par We reviewed imaging and clinical exam.  She understands restaging MRI demonstrated a near complete response.  The tumor originally measured 4.5 cm and is now measuring 1.3 cm on most recent MRI.  WE discussed the possibility of removing her nipple if biopsy cannot be performed prior to surgery and the 4 mm enhancing focus of the left nipple persists on final restaging MRI.  She will complete chemotherapy on February 20th and undergo restaging MRI on March 5th.  Recommendation for consult with plastics and follow up after MRI to discuss final surgical planning.  She understands and agrees to plan.  All questions answered.

## 2020-02-01 NOTE — ASSESSMENT
[FreeTextEntry1] : Abnormal finding on breast imaging (793.89) (R92.8)\par Triple negative malignant neoplasm of breast (174.9) (C50.919)\par Breast mass, left (611.72) (N63.20)\par \par  This is 53 yo perimenopausal  female with newly diagnosed triple negative breast cancer 1.3cm of the left breast with DCIS. Currently undergoing chemotherapy with Dr Dailey and will complete on February 5th.  She will be scheduled to undergo MRI 2 weeks post completion of chemotherapy.  Recommendation for consult with plastics to discuss breast reconstruction(leaning towards bilateral mastectomy with HARSHIL).  \par 1.  Consult with plastics\par 2.  MRI 2 weeks post completion of MRI\par 3.  Followup with Dr. Dailey\par 4.  Biopsy of left nipple ( if biopsy cannot be performed will need to biopsy nipple during surgery)

## 2020-02-04 ENCOUNTER — APPOINTMENT (OUTPATIENT)
Dept: PLASTIC SURGERY | Facility: CLINIC | Age: 55
End: 2020-02-04
Payer: COMMERCIAL

## 2020-02-04 VITALS — WEIGHT: 257 LBS | BODY MASS INDEX: 36.79 KG/M2 | HEIGHT: 70 IN

## 2020-02-04 PROCEDURE — 99245 OFF/OP CONSLTJ NEW/EST HI 55: CPT

## 2020-02-04 NOTE — REASON FOR VISIT
[Consultation] : a consultation visit [Spouse] : spouse [FreeTextEntry1] : Pt is present today for initial consultation referred by Dr. Roque regarding being diagnosed with triple negative in left breast. Pt states it was found through a mammogram, September 2019. Pt states having  a sonogram, Biopsy in September 2019,  MRI was done September 2019 and January 2020. Pt states having no history of breast cancer. Pt states having no family history of breast cancer. Pt denies breast pain, nipple discharge, nipple inversion or palpable mass. Pt report having genetic testing done and being negative for the BRCA gene. Pt reports current breast size is 40B. Pt reports wanting to have a double mastectomy.

## 2020-02-04 NOTE — PHYSICAL EXAM
[de-identified] : right breast with grade 2 ptosis and slightly larger, left breast with grade 1-2 ptosis. Hyperpigmented right port scar with mild hypertrophy. Axillary region with skin graft from hidradenitis excision. Small hidradenitis nodule between breast currently without drainage or erythema. [de-identified] : adequate pannus for bilateral reconstruction but will likely be smaller. Has increased amount of intra-abdominal fat.

## 2020-02-04 NOTE — CONSULT LETTER
[Please see my note below.] : Please see my note below. [Dear  ___] : Dear ~MARTINA, [Consult Letter:] : I had the pleasure of evaluating your patient, [unfilled]. [Sincerely,] : Sincerely, [Consult Closing:] : Thank you very much for allowing me to participate in the care of this patient.  If you have any questions, please do not hesitate to contact me. [FreeTextEntry2] : Silvia Roque MD [FreeTextEntry3] : .sign

## 2020-02-06 ENCOUNTER — LABORATORY RESULT (OUTPATIENT)
Age: 55
End: 2020-02-06

## 2020-02-06 ENCOUNTER — RESULT REVIEW (OUTPATIENT)
Age: 55
End: 2020-02-06

## 2020-02-06 ENCOUNTER — APPOINTMENT (OUTPATIENT)
Age: 55
End: 2020-02-06

## 2020-02-06 LAB
BASOPHILS # BLD AUTO: 0.1 K/UL — SIGNIFICANT CHANGE UP (ref 0–0.2)
BASOPHILS NFR BLD AUTO: 1.3 % — SIGNIFICANT CHANGE UP (ref 0–2)
EOSINOPHIL # BLD AUTO: 0.1 K/UL — SIGNIFICANT CHANGE UP (ref 0–0.5)
EOSINOPHIL NFR BLD AUTO: 1.2 % — SIGNIFICANT CHANGE UP (ref 0–6)
HCT VFR BLD CALC: 40.7 % — SIGNIFICANT CHANGE UP (ref 34.5–45)
HGB BLD-MCNC: 13 G/DL — SIGNIFICANT CHANGE UP (ref 11.5–15.5)
LYMPHOCYTES # BLD AUTO: 1.5 K/UL — SIGNIFICANT CHANGE UP (ref 1–3.3)
LYMPHOCYTES # BLD AUTO: 30.9 % — SIGNIFICANT CHANGE UP (ref 13–44)
MCHC RBC-ENTMCNC: 30.1 PG — SIGNIFICANT CHANGE UP (ref 27–34)
MCHC RBC-ENTMCNC: 31.9 G/DL — LOW (ref 32–36)
MCV RBC AUTO: 94.5 FL — SIGNIFICANT CHANGE UP (ref 80–100)
MONOCYTES # BLD AUTO: 0.4 K/UL — SIGNIFICANT CHANGE UP (ref 0–0.9)
MONOCYTES NFR BLD AUTO: 7.9 % — SIGNIFICANT CHANGE UP (ref 2–14)
NEUTROPHILS # BLD AUTO: 2.8 K/UL — SIGNIFICANT CHANGE UP (ref 1.8–7.4)
NEUTROPHILS NFR BLD AUTO: 58.7 % — SIGNIFICANT CHANGE UP (ref 43–77)
PLATELET # BLD AUTO: 359 K/UL — SIGNIFICANT CHANGE UP (ref 150–400)
RBC # BLD: 4.31 M/UL — SIGNIFICANT CHANGE UP (ref 3.8–5.2)
RBC # FLD: 14 % — SIGNIFICANT CHANGE UP (ref 10.3–14.5)
WBC # BLD: 4.8 K/UL — SIGNIFICANT CHANGE UP (ref 3.8–10.5)
WBC # FLD AUTO: 4.8 K/UL — SIGNIFICANT CHANGE UP (ref 3.8–10.5)

## 2020-02-10 ENCOUNTER — FORM ENCOUNTER (OUTPATIENT)
Age: 55
End: 2020-02-10

## 2020-02-11 ENCOUNTER — OUTPATIENT (OUTPATIENT)
Dept: OUTPATIENT SERVICES | Facility: HOSPITAL | Age: 55
LOS: 1 days | End: 2020-02-11
Payer: COMMERCIAL

## 2020-02-11 ENCOUNTER — APPOINTMENT (OUTPATIENT)
Dept: MRI IMAGING | Facility: CLINIC | Age: 55
End: 2020-02-11
Payer: COMMERCIAL

## 2020-02-11 DIAGNOSIS — L73.9 FOLLICULAR DISORDER, UNSPECIFIED: Chronic | ICD-10-CM

## 2020-02-11 DIAGNOSIS — C50.812 MALIGNANT NEOPLASM OF OVERLAPPING SITES OF LEFT FEMALE BREAST: ICD-10-CM

## 2020-02-11 DIAGNOSIS — Z98.890 OTHER SPECIFIED POSTPROCEDURAL STATES: Chronic | ICD-10-CM

## 2020-02-11 PROCEDURE — 74185 MRA ABD W OR W/O CNTRST: CPT | Mod: 26

## 2020-02-11 PROCEDURE — C8920: CPT

## 2020-02-11 PROCEDURE — C8902: CPT

## 2020-02-11 PROCEDURE — A9585: CPT

## 2020-02-11 PROCEDURE — 72198 MR ANGIO PELVIS W/O & W/DYE: CPT | Mod: 26

## 2020-02-13 ENCOUNTER — RESULT REVIEW (OUTPATIENT)
Age: 55
End: 2020-02-13

## 2020-02-13 ENCOUNTER — APPOINTMENT (OUTPATIENT)
Age: 55
End: 2020-02-13

## 2020-02-13 LAB
BASOPHILS # BLD AUTO: 0.1 K/UL — SIGNIFICANT CHANGE UP (ref 0–0.2)
BASOPHILS NFR BLD AUTO: 1.8 % — SIGNIFICANT CHANGE UP (ref 0–2)
EOSINOPHIL # BLD AUTO: 0.1 K/UL — SIGNIFICANT CHANGE UP (ref 0–0.5)
EOSINOPHIL NFR BLD AUTO: 1.4 % — SIGNIFICANT CHANGE UP (ref 0–6)
HCT VFR BLD CALC: 41.8 % — SIGNIFICANT CHANGE UP (ref 34.5–45)
HGB BLD-MCNC: 13.2 G/DL — SIGNIFICANT CHANGE UP (ref 11.5–15.5)
LYMPHOCYTES # BLD AUTO: 1.2 K/UL — SIGNIFICANT CHANGE UP (ref 1–3.3)
LYMPHOCYTES # BLD AUTO: 32.2 % — SIGNIFICANT CHANGE UP (ref 13–44)
MCHC RBC-ENTMCNC: 29.8 PG — SIGNIFICANT CHANGE UP (ref 27–34)
MCHC RBC-ENTMCNC: 31.7 G/DL — LOW (ref 32–36)
MCV RBC AUTO: 93.8 FL — SIGNIFICANT CHANGE UP (ref 80–100)
MONOCYTES # BLD AUTO: 0.4 K/UL — SIGNIFICANT CHANGE UP (ref 0–0.9)
MONOCYTES NFR BLD AUTO: 9.8 % — SIGNIFICANT CHANGE UP (ref 2–14)
NEUTROPHILS # BLD AUTO: 2.1 K/UL — SIGNIFICANT CHANGE UP (ref 1.8–7.4)
NEUTROPHILS NFR BLD AUTO: 54.8 % — SIGNIFICANT CHANGE UP (ref 43–77)
PLATELET # BLD AUTO: 343 K/UL — SIGNIFICANT CHANGE UP (ref 150–400)
RBC # BLD: 4.45 M/UL — SIGNIFICANT CHANGE UP (ref 3.8–5.2)
RBC # FLD: 13.8 % — SIGNIFICANT CHANGE UP (ref 10.3–14.5)
WBC # BLD: 3.9 K/UL — SIGNIFICANT CHANGE UP (ref 3.8–10.5)
WBC # FLD AUTO: 3.9 K/UL — SIGNIFICANT CHANGE UP (ref 3.8–10.5)

## 2020-02-14 ENCOUNTER — OUTPATIENT (OUTPATIENT)
Dept: OUTPATIENT SERVICES | Facility: HOSPITAL | Age: 55
LOS: 1 days | Discharge: HOME | End: 2020-02-14

## 2020-02-14 DIAGNOSIS — L73.9 FOLLICULAR DISORDER, UNSPECIFIED: Chronic | ICD-10-CM

## 2020-02-14 DIAGNOSIS — C50.919 MALIGNANT NEOPLASM OF UNSPECIFIED SITE OF UNSPECIFIED FEMALE BREAST: ICD-10-CM

## 2020-02-14 DIAGNOSIS — Z98.890 OTHER SPECIFIED POSTPROCEDURAL STATES: Chronic | ICD-10-CM

## 2020-02-20 ENCOUNTER — RESULT REVIEW (OUTPATIENT)
Age: 55
End: 2020-02-20

## 2020-02-20 ENCOUNTER — APPOINTMENT (OUTPATIENT)
Age: 55
End: 2020-02-20

## 2020-02-20 ENCOUNTER — APPOINTMENT (OUTPATIENT)
Dept: HEMATOLOGY ONCOLOGY | Facility: CLINIC | Age: 55
End: 2020-02-20
Payer: COMMERCIAL

## 2020-02-20 VITALS
DIASTOLIC BLOOD PRESSURE: 78 MMHG | WEIGHT: 257 LBS | HEIGHT: 70 IN | OXYGEN SATURATION: 100 % | BODY MASS INDEX: 36.79 KG/M2 | SYSTOLIC BLOOD PRESSURE: 127 MMHG

## 2020-02-20 DIAGNOSIS — Z17.1 MALIGNANT NEOPLASM OF OVERLAPPING SITES OF LEFT FEMALE BREAST: ICD-10-CM

## 2020-02-20 DIAGNOSIS — C50.812 MALIGNANT NEOPLASM OF OVERLAPPING SITES OF LEFT FEMALE BREAST: ICD-10-CM

## 2020-02-20 DIAGNOSIS — N63.20 UNSPECIFIED LUMP IN THE LEFT BREAST, UNSPECIFIED QUADRANT: ICD-10-CM

## 2020-02-20 LAB
BASOPHILS # BLD AUTO: 0.1 K/UL — SIGNIFICANT CHANGE UP (ref 0–0.2)
BASOPHILS NFR BLD AUTO: 1.6 % — SIGNIFICANT CHANGE UP (ref 0–2)
EOSINOPHIL # BLD AUTO: 0 K/UL — SIGNIFICANT CHANGE UP (ref 0–0.5)
EOSINOPHIL NFR BLD AUTO: 1.2 % — SIGNIFICANT CHANGE UP (ref 0–6)
HCT VFR BLD CALC: 39.3 % — SIGNIFICANT CHANGE UP (ref 34.5–45)
HGB BLD-MCNC: 12.4 G/DL — SIGNIFICANT CHANGE UP (ref 11.5–15.5)
LYMPHOCYTES # BLD AUTO: 1.2 K/UL — SIGNIFICANT CHANGE UP (ref 1–3.3)
LYMPHOCYTES # BLD AUTO: 29.7 % — SIGNIFICANT CHANGE UP (ref 13–44)
MCHC RBC-ENTMCNC: 29.7 PG — SIGNIFICANT CHANGE UP (ref 27–34)
MCHC RBC-ENTMCNC: 31.6 G/DL — LOW (ref 32–36)
MCV RBC AUTO: 93.9 FL — SIGNIFICANT CHANGE UP (ref 80–100)
MONOCYTES # BLD AUTO: 0.3 K/UL — SIGNIFICANT CHANGE UP (ref 0–0.9)
MONOCYTES NFR BLD AUTO: 7.9 % — SIGNIFICANT CHANGE UP (ref 2–14)
NEUTROPHILS # BLD AUTO: 2.5 K/UL — SIGNIFICANT CHANGE UP (ref 1.8–7.4)
NEUTROPHILS NFR BLD AUTO: 59.6 % — SIGNIFICANT CHANGE UP (ref 43–77)
PLATELET # BLD AUTO: 338 K/UL — SIGNIFICANT CHANGE UP (ref 150–400)
RBC # BLD: 4.19 M/UL — SIGNIFICANT CHANGE UP (ref 3.8–5.2)
RBC # FLD: 13.7 % — SIGNIFICANT CHANGE UP (ref 10.3–14.5)
WBC # BLD: 4.2 K/UL — SIGNIFICANT CHANGE UP (ref 3.8–10.5)
WBC # FLD AUTO: 4.2 K/UL — SIGNIFICANT CHANGE UP (ref 3.8–10.5)

## 2020-02-20 PROCEDURE — 99214 OFFICE O/P EST MOD 30 MIN: CPT

## 2020-02-20 NOTE — ASSESSMENT
[FreeTextEntry1] : Triple negative poorly differentiated invasive ductal breast cancer presenting as a rapidly enlarging left breast mass at 12:00. 1.5 cm diameter on mammo/sonogram, but 4.5 cm upper central mass as seen on breast MRI.\par S/p 4 cycles of Adriamycin and Cytoxan, Nikki has had an excellent clinical response with reduction in size of palpable left breast mass and markedly improved breast MRI. Last dose of Taxol is today. \par \par Plan:\par -Follow up after surgery to discuss plans going forward

## 2020-02-20 NOTE — PHYSICAL EXAM
[Normal] : affect appropriate [de-identified] : Left breast mass 12:00 position, barely palpable

## 2020-02-20 NOTE — HISTORY OF PRESENT ILLNESS
[Disease: _____________________] : Disease: [unfilled] [de-identified] : Ms. Pak is a 55 y/o female following up for left breast CA. She was referred by Dr. Roque to discuss role of chemotherapy. Reports she felt a lump in her left breast on 9/2/19. Mammogram and pathology reveal 1.5 cm poorly differentiated IDC triple negative mass in her left breast. Reports she is anxious and has not been able to sleep for the past week. She has hx of knee arthritis and prolactinoma, followed by endocrinologist. She has fhx stomach cancer (mother), pancreatic cancer (father), and breast cancer (distant cousin). \par \par MRI Breast 1/3/2020:\par -Near complete resolution of enhancement/mass at the 12:00 position of the left breast.\par -Nonspecific 4 mm enhancing focus within the left nipple. If it would alter management, consider surgical sampling for further evaluation. This is not amenable to image guided biopsy. \par \par MRI 9/20/19:\par 1. Biopsy-proven invasive ductal carcinoma/ductal carcinoma in situ measuring up to 4.5 cm the upper central left breast, as above. Additional nonmass enhancement is noted extending from the superior aspect of the mass posteriorly by approximately 3 cm. Of note, enhancement on MRI in this location is significantly larger than reported on outside imagining reports. Wide excision is recommended. Alternatively, MR guided biopsy of additional nonmass enhancement can be performed if will alter management. The patient is advised to submit prior examinations for comparison at which time an addendum to the current be issued. \par 2. No evidence of suspicious enhancement in the contralateral right breast.\par \par Bilateral diagnostic mammogram/ultrasound 9/13/2019\par New 1.5cm solid mass left breast 12:00 Imaging features are indeterminate. No suspicious finding in the right breast. BIRADS 4: Suspicious\par US: Left breast 12:00 7cm FN 1.5cm hypoechoic round mass with slightly angular margins corresponding to the new mass on mammography. Imaging features are indeterminate. BIRADS 4: Suspicious\par \par Pathology 915/19: \par Left breast 12:00 7cm FN\par Poorly differentiated IDC ER- [place]- [id number] - with DCIS  [de-identified] : IDC [de-identified] : ER-, [place]-, HER-2- [de-identified] : Feeling well today. \par Her last treatment is today, breast MRI is scheduled for March 5th and surgery on March 31st.

## 2020-02-21 DIAGNOSIS — Z51.11 ENCOUNTER FOR ANTINEOPLASTIC CHEMOTHERAPY: ICD-10-CM

## 2020-02-21 DIAGNOSIS — R11.2 NAUSEA WITH VOMITING, UNSPECIFIED: ICD-10-CM

## 2020-02-24 PROBLEM — C50.812 MALIGNANT NEOPLASM OF OVERLAPPING SITES OF LEFT BREAST IN FEMALE, ESTROGEN RECEPTOR NEGATIVE: Noted: 2020-02-04

## 2020-02-24 PROBLEM — N63.20 BREAST MASS, LEFT: Noted: 2019-09-13

## 2020-02-27 ENCOUNTER — APPOINTMENT (OUTPATIENT)
Age: 55
End: 2020-02-27

## 2020-03-05 ENCOUNTER — FORM ENCOUNTER (OUTPATIENT)
Age: 55
End: 2020-03-05

## 2020-03-05 ENCOUNTER — APPOINTMENT (OUTPATIENT)
Age: 55
End: 2020-03-05

## 2020-03-05 ENCOUNTER — OUTPATIENT (OUTPATIENT)
Dept: OUTPATIENT SERVICES | Facility: HOSPITAL | Age: 55
LOS: 1 days | End: 2020-03-05
Payer: COMMERCIAL

## 2020-03-05 ENCOUNTER — APPOINTMENT (OUTPATIENT)
Dept: MRI IMAGING | Facility: CLINIC | Age: 55
End: 2020-03-05
Payer: COMMERCIAL

## 2020-03-05 DIAGNOSIS — L73.9 FOLLICULAR DISORDER, UNSPECIFIED: Chronic | ICD-10-CM

## 2020-03-05 DIAGNOSIS — Z98.890 OTHER SPECIFIED POSTPROCEDURAL STATES: Chronic | ICD-10-CM

## 2020-03-05 DIAGNOSIS — C50.919 MALIGNANT NEOPLASM OF UNSPECIFIED SITE OF UNSPECIFIED FEMALE BREAST: ICD-10-CM

## 2020-03-05 PROCEDURE — C8937: CPT

## 2020-03-05 PROCEDURE — C8908: CPT

## 2020-03-06 PROCEDURE — 77049 MRI BREAST C-+ W/CAD BI: CPT | Mod: 26

## 2020-03-10 ENCOUNTER — APPOINTMENT (OUTPATIENT)
Dept: SURGERY | Facility: CLINIC | Age: 55
End: 2020-03-10
Payer: COMMERCIAL

## 2020-03-10 VITALS
HEART RATE: 101 BPM | BODY MASS INDEX: 35.93 KG/M2 | OXYGEN SATURATION: 97 % | TEMPERATURE: 98.1 F | HEIGHT: 70 IN | WEIGHT: 251 LBS | SYSTOLIC BLOOD PRESSURE: 119 MMHG | DIASTOLIC BLOOD PRESSURE: 78 MMHG

## 2020-03-10 PROCEDURE — 99214 OFFICE O/P EST MOD 30 MIN: CPT

## 2020-03-11 NOTE — PHYSICAL EXAM
[Normocephalic] : normocephalic [Atraumatic] : atraumatic [EOMI] : extra ocular movement intact [PERRL] : pupils equal, round and reactive to light [Sclera nonicteric] : sclera nonicteric [Supple] : supple [No Supraclavicular Adenopathy] : no supraclavicular adenopathy [Examined in the supine and seated position] : examined in the supine and seated position [No dominant masses] : no dominant masses in right breast  [No dominant masses] : no dominant masses left breast [No Nipple Retraction] : no left nipple retraction [No Nipple Discharge] : no left nipple discharge [No Axillary Lymphadenopathy] : no left axillary lymphadenopathy [No Edema] : no edema [No Rashes] : no rashes [No Ulceration] : no ulceration [de-identified] : No supraclavicular or axillary adenopathy. No dominant masses, normal to palpation. Everted nipple without discharge. No skin changes.\par \par  [de-identified] : No supraclavicular or axillary adenopathy. No dominant masses, normal to palpation. Everted nipple without discharge. No skin changes.\par \par

## 2020-03-11 NOTE — ASSESSMENT
[FreeTextEntry1] : Abnormal finding on breast imaging (793.89) (R92.8)\par Triple negative malignant neoplasm of breast (174.9) (C50.919)\par Breast mass, left (611.72) (N63.20)\par \par  This is 54 yo perimenopausal  female with newly diagnosed triple negative breast cancer presents to review restaging MRI breast.  Restaging MRI demonstrated resolution of the previously identified enhancing mass in the left upper central breast.  The 4 mm enhancing focus of the left nipple persists on final restaging MRI.  Plan for bilateral skin sparring mastectomy with left SLNB possible left ALNB and HARSHIL.\par 1.  Plan for bilateral skin sparring mastectomy with left SLNB possible left ALNB and HARSHIL\par 2.  Followup with Dr. Daniels\par 3.  Followup with Dr. Dailey\par 4.  Biopsy of left nipple ( if biopsy cannot be performed will need to biopsy nipple during surgery)

## 2020-03-11 NOTE — HISTORY OF PRESENT ILLNESS
[FreeTextEntry1] : I had the pleasure of seeing Nikki Pak in the office to review restaging MRI breast secondary to newly diagnosed triple negative left breast cancer.\par \par Nikki is accompanied by her  Presley. She is a nael 53 yo nulliparous  female who has been in stable overall health.\par \par She palpated a left breast mass in the shower on labor day and underwent diagnostic mammogram/ultrasound 9/13. A biopsy was performed demonstrating poorly differentiated IDC [id number] negative with DCIS measuring 1.5 cm.\par \par Imaging : \par [facility]  -MR Breast WAIC BI W CAD #\par MRI 9/20/19\par 1. Biopsy-proven invasive ductal carcinoma/ductal carcinoma in situ measuring up to 4.5 cm the upper central left breast, as above. Additional nonmass enhancement is noted extending from the superior aspect of the mass posteriorly by approximately 3 cm. Of note, enhancement on MRI in this location is significantly larger than reported on outside imagining reports. Wide excision is recommended. Alternatively, MR guided biopsy of additional nonmass enhancement can be performed if will alter management. The patient is advised to submit prior examinations for comparison at which time an addendum to the current be issued. \par 2. No evidence of suspicious enhancement in the contralateral right breast.\par \par  [facility]\par Bilateral diagnostic mammogram/ultrasound 9/13/2019\par Impression: New 1.5cm solid mass left breast 12:00 Imaging features are indeterminate. No suspicious finding in the right breast. BIRADS 4: Suspicious\par US: Left breast 12:00 7cm FN 1.5cm hypoechoic round mass with slightly angular margins corresponding to the new mass on mammography. Imaging features are indeterminate. BIRADS 4: Suspicious\par \par Pathology: Left breast 12:00 7cm FN\par Poorly differentiated IDC ER- [place]- [id number] - with DCIS \par \par MIDSTAGING MRI BREAST:\par 1/3/202  MR breast IC BI CAD\par Impression:  Near complete resolution of enhancement/mass at the 12:00 position of the left breast.  Nonspecific 4 mm enhancing focus within the left nipple.  If it would alter management, consider surgical sampling for further evaluation.  This is not amenable to image guided biopsy.  BIRADS 4A low suspicion.\par \par 3/6/2020  MR Breast\par Impression:  Left breast malignancy status post neoadjuvant chemotherapy.  There has been resolution of previously seen enhancement in the let upper central breast.  Again noted is a stable 4 mm enhancing focus with the left nipple.  As previously discussed, this findings is not amendable to image guided biopsy.  BIRADS 6\par \par We reviewed imaging and clinical exam.  She understands restaging MRI demonstrated resolution of the previously identified enhancing mass in the left upper central breast.  The 4 mm enhancing focus of the left nipple persists on final restaging MRI.  Plan for bilateral skin sparring mastectomy with left SLNB possible left ALNB.\par \par She understands and agrees with plan.  All questions answered.\par

## 2020-03-12 ENCOUNTER — OUTPATIENT (OUTPATIENT)
Dept: OUTPATIENT SERVICES | Facility: HOSPITAL | Age: 55
LOS: 1 days | End: 2020-03-12
Payer: COMMERCIAL

## 2020-03-12 ENCOUNTER — APPOINTMENT (OUTPATIENT)
Age: 55
End: 2020-03-12

## 2020-03-12 VITALS
DIASTOLIC BLOOD PRESSURE: 70 MMHG | RESPIRATION RATE: 16 BRPM | WEIGHT: 250 LBS | OXYGEN SATURATION: 99 % | TEMPERATURE: 98 F | SYSTOLIC BLOOD PRESSURE: 120 MMHG | HEIGHT: 70 IN | HEART RATE: 86 BPM

## 2020-03-12 DIAGNOSIS — C50.812 MALIGNANT NEOPLASM OF OVERLAPPING SITES OF LEFT FEMALE BREAST: ICD-10-CM

## 2020-03-12 DIAGNOSIS — Z98.890 OTHER SPECIFIED POSTPROCEDURAL STATES: Chronic | ICD-10-CM

## 2020-03-12 DIAGNOSIS — Z45.2 ENCOUNTER FOR ADJUSTMENT AND MANAGEMENT OF VASCULAR ACCESS DEVICE: Chronic | ICD-10-CM

## 2020-03-12 DIAGNOSIS — E11.9 TYPE 2 DIABETES MELLITUS WITHOUT COMPLICATIONS: ICD-10-CM

## 2020-03-12 DIAGNOSIS — C50.919 MALIGNANT NEOPLASM OF UNSPECIFIED SITE OF UNSPECIFIED FEMALE BREAST: ICD-10-CM

## 2020-03-12 DIAGNOSIS — L73.9 FOLLICULAR DISORDER, UNSPECIFIED: Chronic | ICD-10-CM

## 2020-03-12 LAB
ALBUMIN SERPL ELPH-MCNC: 4.3 G/DL — SIGNIFICANT CHANGE UP (ref 3.3–5)
ALP SERPL-CCNC: 70 U/L — SIGNIFICANT CHANGE UP (ref 40–120)
ALT FLD-CCNC: 19 U/L — SIGNIFICANT CHANGE UP (ref 4–33)
ANION GAP SERPL CALC-SCNC: 14 MMO/L — SIGNIFICANT CHANGE UP (ref 7–14)
AST SERPL-CCNC: 17 U/L — SIGNIFICANT CHANGE UP (ref 4–32)
BILIRUB SERPL-MCNC: 0.3 MG/DL — SIGNIFICANT CHANGE UP (ref 0.2–1.2)
BLD GP AB SCN SERPL QL: NEGATIVE — SIGNIFICANT CHANGE UP
BUN SERPL-MCNC: 12 MG/DL — SIGNIFICANT CHANGE UP (ref 7–23)
CALCIUM SERPL-MCNC: 9.7 MG/DL — SIGNIFICANT CHANGE UP (ref 8.4–10.5)
CHLORIDE SERPL-SCNC: 102 MMOL/L — SIGNIFICANT CHANGE UP (ref 98–107)
CO2 SERPL-SCNC: 24 MMOL/L — SIGNIFICANT CHANGE UP (ref 22–31)
CREAT SERPL-MCNC: 0.77 MG/DL — SIGNIFICANT CHANGE UP (ref 0.5–1.3)
GLUCOSE SERPL-MCNC: 95 MG/DL — SIGNIFICANT CHANGE UP (ref 70–99)
HBA1C BLD-MCNC: 5.5 % — SIGNIFICANT CHANGE UP (ref 4–5.6)
HCT VFR BLD CALC: 40.3 % — SIGNIFICANT CHANGE UP (ref 34.5–45)
HGB BLD-MCNC: 13.2 G/DL — SIGNIFICANT CHANGE UP (ref 11.5–15.5)
MCHC RBC-ENTMCNC: 30.1 PG — SIGNIFICANT CHANGE UP (ref 27–34)
MCHC RBC-ENTMCNC: 32.8 % — SIGNIFICANT CHANGE UP (ref 32–36)
MCV RBC AUTO: 91.8 FL — SIGNIFICANT CHANGE UP (ref 80–100)
NRBC # FLD: 0 K/UL — SIGNIFICANT CHANGE UP (ref 0–0)
PLATELET # BLD AUTO: 316 K/UL — SIGNIFICANT CHANGE UP (ref 150–400)
PMV BLD: 10.1 FL — SIGNIFICANT CHANGE UP (ref 7–13)
POTASSIUM SERPL-MCNC: 4 MMOL/L — SIGNIFICANT CHANGE UP (ref 3.5–5.3)
POTASSIUM SERPL-SCNC: 4 MMOL/L — SIGNIFICANT CHANGE UP (ref 3.5–5.3)
PROT SERPL-MCNC: 7.5 G/DL — SIGNIFICANT CHANGE UP (ref 6–8.3)
RBC # BLD: 4.39 M/UL — SIGNIFICANT CHANGE UP (ref 3.8–5.2)
RBC # FLD: 13.7 % — SIGNIFICANT CHANGE UP (ref 10.3–14.5)
RH IG SCN BLD-IMP: POSITIVE — SIGNIFICANT CHANGE UP
SODIUM SERPL-SCNC: 140 MMOL/L — SIGNIFICANT CHANGE UP (ref 135–145)
WBC # BLD: 6 K/UL — SIGNIFICANT CHANGE UP (ref 3.8–10.5)
WBC # FLD AUTO: 6 K/UL — SIGNIFICANT CHANGE UP (ref 3.8–10.5)

## 2020-03-12 PROCEDURE — 93010 ELECTROCARDIOGRAM REPORT: CPT

## 2020-03-12 RX ORDER — METFORMIN HYDROCHLORIDE 850 MG/1
1 TABLET ORAL
Qty: 0 | Refills: 0 | DISCHARGE

## 2020-03-12 NOTE — H&P PST ADULT - NSICDXPROBLEM_GEN_ALL_CORE_FT
PROBLEM DIAGNOSES  Problem: Malignant neoplasm of female breast  Assessment and Plan: Scheduled for Bilateral Mastectomy with HARSHIL Flap Breast Reconstruction, Intravenous Angiography on 03/31/2020  Preop instructions provided and patient verbalizes understanding.  Labs done and results pending.  Famotidine provided with instructions. Hibiclens provided with instructions and was signed by patient. Teach-back method was utilized to assess patient's understanding. Patient verbalized understanding.  Pt is scheduled for medical evaluation on 03/20/2020 as requested by surgeon.      Problem: Type 2 diabetes mellitus  Assessment and Plan: Pt instructed last dose of Metformin is morning of  03/30/2020 and no DM med on the morning of procedure.

## 2020-03-12 NOTE — H&P PST ADULT - NSICDXPASTSURGICALHX_GEN_ALL_CORE_FT
PAST SURGICAL HISTORY:  Disorder of hair follicle bilateral axilla    H/O arthroscopy of right knee     S/P dilatation and curettage PAST SURGICAL HISTORY:  Disorder of hair follicle bilateral axilla    Encounter for central line care [facility]    H/O arthroscopy of right knee     S/P dilatation and curettage

## 2020-03-12 NOTE — H&P PST ADULT - GASTROINTESTINAL DETAILS
no guarding/soft/nontender/no masses palpable/no organomegaly/no rebound tenderness/no rigidity/no distention/bowel sounds normal/no bruit

## 2020-03-12 NOTE — H&P PST ADULT - HISTORY OF PRESENT ILLNESS
55 yr old female with medical hx T2DM and osteoarthritis presents for preop evaluation with dx of Left breast cancer dx September 2019.  Pt s/p chemo which ended February 2020.  Pt is now scheduled for Bilateral Mastectomy with Anali Flap Breast Reconstruction, Intravenous Angiography.

## 2020-03-12 NOTE — H&P PST ADULT - NSICDXPASTMEDICALHX_GEN_ALL_CORE_FT
PAST MEDICAL HISTORY:  Arthritis     PCOS (polycystic ovarian syndrome) PAST MEDICAL HISTORY:  Arthritis     Breast cancer left    PCOS (polycystic ovarian syndrome)

## 2020-03-12 NOTE — H&P PST ADULT - RS GEN PE MLT RESP DETAILS PC
clear to auscultation bilaterally/respirations non-labored/no rhonchi/no rales/no wheezes/breath sounds equal

## 2020-03-12 NOTE — H&P PST ADULT - EXTERNAL EAR R
Last office visit 2/8/18    Refill Atorvastatin 80 mg  Last refill 2/8/18    Lisinopril 40 mg  Last refill 2/8/18 normal

## 2020-03-12 NOTE — H&P PST ADULT - NSICDXFAMILYHX_GEN_ALL_CORE_FT
FAMILY HISTORY:  Father  Still living? Yes, Estimated age: 71-80  Family history of acute myocardial infarction, Age at diagnosis: 61-70  Family history of hypertension, Age at diagnosis: Age Unknown  Family history of pancreatic cancer, Age at diagnosis: Age Unknown    Mother  Still living? No  Family history of gastric cancer, Age at diagnosis: 51-60

## 2020-03-17 ENCOUNTER — APPOINTMENT (OUTPATIENT)
Dept: INTERNAL MEDICINE | Facility: CLINIC | Age: 55
End: 2020-03-17
Payer: COMMERCIAL

## 2020-03-17 VITALS
HEIGHT: 70 IN | SYSTOLIC BLOOD PRESSURE: 106 MMHG | RESPIRATION RATE: 12 BRPM | WEIGHT: 262 LBS | HEART RATE: 96 BPM | DIASTOLIC BLOOD PRESSURE: 64 MMHG | BODY MASS INDEX: 37.51 KG/M2

## 2020-03-17 VITALS — BODY MASS INDEX: 36.16 KG/M2 | WEIGHT: 252 LBS

## 2020-03-17 DIAGNOSIS — Z78.9 OTHER SPECIFIED HEALTH STATUS: ICD-10-CM

## 2020-03-17 PROCEDURE — 99203 OFFICE O/P NEW LOW 30 MIN: CPT

## 2020-03-17 RX ORDER — TRAMADOL HYDROCHLORIDE 50 MG/1
50 TABLET, COATED ORAL
Qty: 90 | Refills: 0 | Status: DISCONTINUED | COMMUNITY
Start: 2018-10-03 | End: 2020-03-17

## 2020-03-17 RX ORDER — PROCHLORPERAZINE MALEATE 10 MG/1
10 TABLET ORAL EVERY 6 HOURS
Qty: 28 | Refills: 2 | Status: DISCONTINUED | COMMUNITY
Start: 2019-09-23 | End: 2020-03-17

## 2020-03-17 RX ORDER — HYALURONATE SOD, CROSS-LINKED 30 MG/3 ML
30 SYRINGE (ML) INTRAARTICULAR
Qty: 2 | Refills: 0 | Status: DISCONTINUED | OUTPATIENT
Start: 2018-10-03 | End: 2020-03-17

## 2020-03-17 RX ORDER — HYALURONATE SOD, CROSS-LINKED 30 MG/3 ML
30 SYRINGE (ML) INTRAARTICULAR
Qty: 2 | Refills: 0 | Status: DISCONTINUED | OUTPATIENT
Start: 2019-05-24 | End: 2020-03-17

## 2020-03-17 RX ORDER — CELECOXIB 100 MG/1
100 CAPSULE ORAL TWICE DAILY
Qty: 60 | Refills: 3 | Status: COMPLETED | COMMUNITY
Start: 2018-10-03 | End: 2020-03-17

## 2020-03-17 RX ORDER — CELECOXIB 100 MG/1
100 CAPSULE ORAL TWICE DAILY
Qty: 28 | Refills: 0 | Status: DISCONTINUED | COMMUNITY
Start: 2019-06-28 | End: 2020-03-17

## 2020-03-17 RX ORDER — TRAMADOL HYDROCHLORIDE 50 MG/1
50 TABLET, COATED ORAL
Qty: 10 | Refills: 0 | Status: DISCONTINUED | COMMUNITY
Start: 2018-02-01 | End: 2020-03-17

## 2020-03-17 RX ORDER — LORAZEPAM 0.5 MG/1
0.5 TABLET ORAL 3 TIMES DAILY
Qty: 90 | Refills: 0 | Status: COMPLETED | COMMUNITY
Start: 2019-10-24 | End: 2020-03-17

## 2020-03-17 RX ORDER — CELECOXIB 100 MG/1
100 CAPSULE ORAL
Qty: 60 | Refills: 0 | Status: DISCONTINUED | COMMUNITY
Start: 2019-07-08 | End: 2020-03-17

## 2020-03-17 RX ORDER — HYALURONATE SOD, CROSS-LINKED 30 MG/3 ML
30 SYRINGE (ML) INTRAARTICULAR
Qty: 2 | Refills: 0 | Status: DISCONTINUED | OUTPATIENT
Start: 2019-10-25 | End: 2020-03-17

## 2020-03-17 RX ORDER — TRAMADOL HYDROCHLORIDE 50 MG/1
50 TABLET, COATED ORAL
Qty: 90 | Refills: 0 | Status: DISCONTINUED | COMMUNITY
Start: 2019-07-11 | End: 2020-03-17

## 2020-03-17 RX ORDER — LORAZEPAM 0.5 MG/1
0.5 TABLET ORAL
Qty: 30 | Refills: 0 | Status: DISCONTINUED | COMMUNITY
Start: 2019-09-23 | End: 2020-03-17

## 2020-03-17 RX ORDER — ONDANSETRON 8 MG/1
8 TABLET ORAL
Qty: 30 | Refills: 5 | Status: DISCONTINUED | COMMUNITY
Start: 2019-09-23 | End: 2020-03-17

## 2020-03-17 RX ORDER — HYALURONATE SOD, CROSS-LINKED 30 MG/3 ML
30 SYRINGE (ML) INTRAARTICULAR
Qty: 2 | Refills: 0 | Status: DISCONTINUED | COMMUNITY
Start: 2018-10-04 | End: 2020-03-17

## 2020-03-17 NOTE — HISTORY OF PRESENT ILLNESS
[No Pertinent Cardiac History] : no history of aortic stenosis, atrial fibrillation, coronary artery disease, recent myocardial infarction, or implantable device/pacemaker [Aortic Stenosis] : no aortic stenosis [Atrial Fibrillation] : no atrial fibrillation [Coronary Artery Disease] : no coronary artery disease [Recent Myocardial Infarction] : no recent myocardial infarction [Implantable Device/Pacemaker] : no implantable device/pacemaker [No Pertinent Pulmonary History] : no history of asthma, COPD, sleep apnea, or smoking [Asthma] : no asthma [COPD] : no COPD [Sleep Apnea] : no sleep apnea [Smoker] : not a smoker [No Adverse Anesthesia Reaction] : no adverse anesthesia reaction in self or family member [Family Member] : no family member with adverse anesthesia reaction/sudden death [Self] : no previous adverse anesthesia reaction [Chronic Anticoagulation] : no chronic anticoagulation [Chronic Kidney Disease] : no chronic kidney disease [Diabetes] : diabetes [(Patient denies any chest pain, claudication, dyspnea on exertion, orthopnea, palpitations or syncope)] : Patient denies any chest pain, claudication, dyspnea on exertion, orthopnea, palpitations or syncope [Moderate (4-6 METs)] : Moderate (4-6 METs) [FreeTextEntry1] : Bilateral Masectomy with Reconstruction [FreeTextEntry2] : 3/31/20 [FreeTextEntry3] : Dr. Roque/ Dr. Daniels [FreeTextEntry4] : Patient with history of pre-diabetes who will undergo bilateral masectomy with reconstruction.

## 2020-03-17 NOTE — RESULTS/DATA
[] : results reviewed [de-identified] : normal [de-identified] : normal [de-identified] : normal sinus rhythm

## 2020-03-17 NOTE — ASSESSMENT
[Patient Optimized for Surgery] : Patient optimized for surgery [No Further Testing Recommended] : no further testing recommended [Modify medications prior to procedure] : Modify medications prior to procedure [FreeTextEntry4] : Patient is medically stable for planned procedure.  [FreeTextEntry7] : hold metformin 24 hour prior,  hold nsaids

## 2020-03-19 ENCOUNTER — APPOINTMENT (OUTPATIENT)
Age: 55
End: 2020-03-19

## 2020-03-19 PROBLEM — C50.919 MALIGNANT NEOPLASM OF UNSPECIFIED SITE OF UNSPECIFIED FEMALE BREAST: Chronic | Status: ACTIVE | Noted: 2020-03-12

## 2020-03-23 ENCOUNTER — APPOINTMENT (OUTPATIENT)
Dept: MAMMOGRAPHY | Facility: CLINIC | Age: 55
End: 2020-03-23

## 2020-03-24 ENCOUNTER — APPOINTMENT (OUTPATIENT)
Dept: PLASTIC SURGERY | Facility: HOSPITAL | Age: 55
End: 2020-03-24

## 2020-03-24 ENCOUNTER — RESULT REVIEW (OUTPATIENT)
Age: 55
End: 2020-03-24

## 2020-03-24 ENCOUNTER — OUTPATIENT (OUTPATIENT)
Dept: OUTPATIENT SERVICES | Facility: HOSPITAL | Age: 55
LOS: 1 days | End: 2020-03-24
Payer: COMMERCIAL

## 2020-03-24 DIAGNOSIS — C50.919 MALIGNANT NEOPLASM OF UNSPECIFIED SITE OF UNSPECIFIED FEMALE BREAST: ICD-10-CM

## 2020-03-24 DIAGNOSIS — Z98.890 OTHER SPECIFIED POSTPROCEDURAL STATES: Chronic | ICD-10-CM

## 2020-03-24 DIAGNOSIS — L73.9 FOLLICULAR DISORDER, UNSPECIFIED: Chronic | ICD-10-CM

## 2020-03-24 DIAGNOSIS — Z45.2 ENCOUNTER FOR ADJUSTMENT AND MANAGEMENT OF VASCULAR ACCESS DEVICE: Chronic | ICD-10-CM

## 2020-03-24 PROCEDURE — 88321 CONSLTJ&REPRT SLD PREP ELSWR: CPT

## 2020-03-25 DIAGNOSIS — C50.919 MALIGNANT NEOPLASM OF UNSPECIFIED SITE OF UNSPECIFIED FEMALE BREAST: ICD-10-CM

## 2020-03-26 ENCOUNTER — APPOINTMENT (OUTPATIENT)
Age: 55
End: 2020-03-26

## 2020-03-26 ENCOUNTER — OUTPATIENT (OUTPATIENT)
Dept: OUTPATIENT SERVICES | Facility: HOSPITAL | Age: 55
LOS: 1 days | End: 2020-03-26
Payer: COMMERCIAL

## 2020-03-26 VITALS
HEART RATE: 98 BPM | OXYGEN SATURATION: 100 % | RESPIRATION RATE: 16 BRPM | WEIGHT: 251.99 LBS | HEIGHT: 70 IN | DIASTOLIC BLOOD PRESSURE: 77 MMHG | SYSTOLIC BLOOD PRESSURE: 137 MMHG | TEMPERATURE: 99 F

## 2020-03-26 DIAGNOSIS — Z98.890 OTHER SPECIFIED POSTPROCEDURAL STATES: Chronic | ICD-10-CM

## 2020-03-26 DIAGNOSIS — Z45.2 ENCOUNTER FOR ADJUSTMENT AND MANAGEMENT OF VASCULAR ACCESS DEVICE: Chronic | ICD-10-CM

## 2020-03-26 DIAGNOSIS — L73.9 FOLLICULAR DISORDER, UNSPECIFIED: Chronic | ICD-10-CM

## 2020-03-26 DIAGNOSIS — Z01.818 ENCOUNTER FOR OTHER PREPROCEDURAL EXAMINATION: ICD-10-CM

## 2020-03-26 DIAGNOSIS — C50.919 MALIGNANT NEOPLASM OF UNSPECIFIED SITE OF UNSPECIFIED FEMALE BREAST: ICD-10-CM

## 2020-03-26 PROCEDURE — 87635 SARS-COV-2 COVID-19 AMP PRB: CPT

## 2020-03-26 NOTE — H&P PST ADULT - NSICDXPASTMEDICALHX_GEN_ALL_CORE_FT
PAST MEDICAL HISTORY:  Arthritis bilateral knees    Breast cancer left    History of pituitary tumor small    History of prolapse of bladder sling    History of uterine fibroid     Insomnia     PCOS (polycystic ovarian syndrome)     Prediabetes

## 2020-03-26 NOTE — H&P PST ADULT - NSANTHOSAYNRD_GEN_A_CORE
[Follow-Up] : a follow-up visit
No. REYNALDO screening performed.  STOP BANG Legend: 0-2 = LOW Risk; 3-4 = INTERMEDIATE Risk; 5-8 = HIGH Risk

## 2020-03-26 NOTE — H&P PST ADULT - HISTORY OF PRESENT ILLNESS
PST done at [facility] 3/12/2020. 55 yr old female with medical hx T2DM and osteoarthritis presents for preop evaluation with dx of Left breast cancer dx September 2019.  Pt s/p chemo which ended February 2020.  Pt is now scheduled for Bilateral Mastectomy with Anali Flap Breast Reconstruction, Intravenous Angiography.

## 2020-03-26 NOTE — H&P PST ADULT - NSICDXPASTSURGICALHX_GEN_ALL_CORE_FT
PAST SURGICAL HISTORY:  Disorder of hair follicle bilateral axilla    Encounter for central line care [facility] - 10/2019    H/O arthroscopy of right knee     History of bladder surgery sling    S/P dilatation and curettage

## 2020-03-26 NOTE — H&P PST ADULT - ASSESSMENT
55 years old female present to PST prior to left leonel  localized lumpectomy with left sentinel lymph node with Dr. Roque.    Plan   1. NPO after midnight  2. Use E-Z sponge as directed  3. Drink a quart of extra  fluids the day before your surgery.  4. CBC, BMP, CMP, Type and Screen, HGB AIC and EKG  on chart from [facility] 3/12/2020  5. Covid-19 PCR sent to lab

## 2020-03-26 NOTE — H&P PST ADULT - HEALTH CARE MAINTENANCE
flu vaccine 2019  Denies travel in last 14 days.   Denies fever, sob or cough. Denies contact with anyone with IRU s/s

## 2020-03-27 ENCOUNTER — RESULT REVIEW (OUTPATIENT)
Age: 55
End: 2020-03-27

## 2020-03-27 ENCOUNTER — OUTPATIENT (OUTPATIENT)
Dept: OUTPATIENT SERVICES | Facility: HOSPITAL | Age: 55
LOS: 1 days | End: 2020-03-27
Payer: COMMERCIAL

## 2020-03-27 ENCOUNTER — APPOINTMENT (OUTPATIENT)
Dept: MAMMOGRAPHY | Facility: IMAGING CENTER | Age: 55
End: 2020-03-27
Payer: COMMERCIAL

## 2020-03-27 DIAGNOSIS — Z45.2 ENCOUNTER FOR ADJUSTMENT AND MANAGEMENT OF VASCULAR ACCESS DEVICE: Chronic | ICD-10-CM

## 2020-03-27 DIAGNOSIS — C50.919 MALIGNANT NEOPLASM OF UNSPECIFIED SITE OF UNSPECIFIED FEMALE BREAST: ICD-10-CM

## 2020-03-27 DIAGNOSIS — Z01.818 ENCOUNTER FOR OTHER PREPROCEDURAL EXAMINATION: ICD-10-CM

## 2020-03-27 DIAGNOSIS — Z98.890 OTHER SPECIFIED POSTPROCEDURAL STATES: Chronic | ICD-10-CM

## 2020-03-27 DIAGNOSIS — R92.8 OTHER ABNORMAL AND INCONCLUSIVE FINDINGS ON DIAGNOSTIC IMAGING OF BREAST: ICD-10-CM

## 2020-03-27 DIAGNOSIS — L73.9 FOLLICULAR DISORDER, UNSPECIFIED: Chronic | ICD-10-CM

## 2020-03-27 PROBLEM — Z86.018 PERSONAL HISTORY OF OTHER BENIGN NEOPLASM: Chronic | Status: ACTIVE | Noted: 2020-03-26

## 2020-03-27 PROBLEM — R73.03 PREDIABETES: Chronic | Status: ACTIVE | Noted: 2020-03-26

## 2020-03-27 LAB — SARS-COV-2 RNA SPEC QL NAA+PROBE: SIGNIFICANT CHANGE UP

## 2020-03-27 PROCEDURE — 19281 PERQ DEVICE BREAST 1ST IMAG: CPT

## 2020-03-27 PROCEDURE — C1739: CPT

## 2020-03-27 PROCEDURE — 19281 PERQ DEVICE BREAST 1ST IMAG: CPT | Mod: LT

## 2020-03-27 RX ORDER — SODIUM CHLORIDE 9 MG/ML
1000 INJECTION, SOLUTION INTRAVENOUS
Refills: 0 | Status: DISCONTINUED | OUTPATIENT
Start: 2020-03-30 | End: 2020-03-30

## 2020-03-27 RX ORDER — FENTANYL CITRATE 50 UG/ML
50 INJECTION INTRAVENOUS
Refills: 0 | Status: DISCONTINUED | OUTPATIENT
Start: 2020-03-30 | End: 2020-03-30

## 2020-03-27 RX ORDER — ONDANSETRON 8 MG/1
4 TABLET, FILM COATED ORAL ONCE
Refills: 0 | Status: DISCONTINUED | OUTPATIENT
Start: 2020-03-30 | End: 2020-03-30

## 2020-03-30 ENCOUNTER — RESULT REVIEW (OUTPATIENT)
Age: 55
End: 2020-03-30

## 2020-03-30 ENCOUNTER — APPOINTMENT (OUTPATIENT)
Dept: SURGERY | Facility: HOSPITAL | Age: 55
End: 2020-03-30

## 2020-03-30 ENCOUNTER — OUTPATIENT (OUTPATIENT)
Dept: INPATIENT UNIT | Facility: HOSPITAL | Age: 55
LOS: 1 days | Discharge: HOME | End: 2020-03-30
Payer: COMMERCIAL

## 2020-03-30 VITALS
SYSTOLIC BLOOD PRESSURE: 120 MMHG | DIASTOLIC BLOOD PRESSURE: 76 MMHG | HEIGHT: 70 IN | HEART RATE: 96 BPM | TEMPERATURE: 99 F | RESPIRATION RATE: 15 BRPM | OXYGEN SATURATION: 100 % | WEIGHT: 251.99 LBS

## 2020-03-30 VITALS
DIASTOLIC BLOOD PRESSURE: 76 MMHG | SYSTOLIC BLOOD PRESSURE: 128 MMHG | HEART RATE: 80 BPM | RESPIRATION RATE: 15 BRPM | OXYGEN SATURATION: 100 %

## 2020-03-30 DIAGNOSIS — L73.9 FOLLICULAR DISORDER, UNSPECIFIED: Chronic | ICD-10-CM

## 2020-03-30 DIAGNOSIS — Z98.890 OTHER SPECIFIED POSTPROCEDURAL STATES: Chronic | ICD-10-CM

## 2020-03-30 DIAGNOSIS — Z45.2 ENCOUNTER FOR ADJUSTMENT AND MANAGEMENT OF VASCULAR ACCESS DEVICE: Chronic | ICD-10-CM

## 2020-03-30 DIAGNOSIS — C50.919 MALIGNANT NEOPLASM OF UNSPECIFIED SITE OF UNSPECIFIED FEMALE BREAST: ICD-10-CM

## 2020-03-30 PROCEDURE — 88333 PATH CONSLTJ SURG CYTO XM 1: CPT | Mod: 26

## 2020-03-30 PROCEDURE — 76098 X-RAY EXAM SURGICAL SPECIMEN: CPT | Mod: 26

## 2020-03-30 PROCEDURE — 38792 RA TRACER ID OF SENTINL NODE: CPT | Mod: LT

## 2020-03-30 PROCEDURE — 88307 TISSUE EXAM BY PATHOLOGIST: CPT | Mod: 26

## 2020-03-30 PROCEDURE — 38525 BIOPSY/REMOVAL LYMPH NODES: CPT | Mod: LT

## 2020-03-30 PROCEDURE — 88305 TISSUE EXAM BY PATHOLOGIST: CPT

## 2020-03-30 PROCEDURE — 88333 PATH CONSLTJ SURG CYTO XM 1: CPT

## 2020-03-30 PROCEDURE — A9520: CPT

## 2020-03-30 PROCEDURE — 76098 X-RAY EXAM SURGICAL SPECIMEN: CPT

## 2020-03-30 PROCEDURE — 88307 TISSUE EXAM BY PATHOLOGIST: CPT

## 2020-03-30 PROCEDURE — 19301 PARTIAL MASTECTOMY: CPT | Mod: LT

## 2020-03-30 PROCEDURE — 88305 TISSUE EXAM BY PATHOLOGIST: CPT | Mod: 26

## 2020-03-30 RX ORDER — FAMOTIDINE 10 MG/ML
20 INJECTION INTRAVENOUS ONCE
Refills: 0 | Status: COMPLETED | OUTPATIENT
Start: 2020-03-30 | End: 2020-03-30

## 2020-03-30 RX ORDER — CELECOXIB 200 MG/1
1 CAPSULE ORAL
Qty: 0 | Refills: 0 | DISCHARGE

## 2020-03-30 RX ORDER — OXYCODONE HYDROCHLORIDE 5 MG/1
10 TABLET ORAL ONCE
Refills: 0 | Status: DISCONTINUED | OUTPATIENT
Start: 2020-03-30 | End: 2020-03-30

## 2020-03-30 RX ORDER — ACETAMINOPHEN 500 MG
975 TABLET ORAL ONCE
Refills: 0 | Status: COMPLETED | OUTPATIENT
Start: 2020-03-30 | End: 2020-03-30

## 2020-03-30 RX ORDER — METOCLOPRAMIDE HCL 10 MG
10 TABLET ORAL ONCE
Refills: 0 | Status: COMPLETED | OUTPATIENT
Start: 2020-03-30 | End: 2020-03-30

## 2020-03-30 RX ADMIN — FAMOTIDINE 20 MILLIGRAM(S): 10 INJECTION INTRAVENOUS at 07:14

## 2020-03-30 RX ADMIN — OXYCODONE HYDROCHLORIDE 10 MILLIGRAM(S): 5 TABLET ORAL at 09:55

## 2020-03-30 RX ADMIN — Medication 10 MILLIGRAM(S): at 07:17

## 2020-03-30 RX ADMIN — Medication 975 MILLIGRAM(S): at 07:14

## 2020-03-30 NOTE — ASU PATIENT PROFILE, ADULT - PMH
Arthritis  bilateral knees  Breast cancer  left  History of pituitary tumor  small  History of prolapse of bladder  sling  History of uterine fibroid    Insomnia    PCOS (polycystic ovarian syndrome)    Prediabetes

## 2020-03-30 NOTE — ASU DISCHARGE PLAN (ADULT/PEDIATRIC) - CALL YOUR DOCTOR IF YOU HAVE ANY OF THE FOLLOWING:
Wound/Surgical Site with redness, or foul smelling discharge or pus/Unable to urinate/Swelling that gets worse/Pain not relieved by Medications/Numbness, tingling, color or temperature change to extremity

## 2020-03-30 NOTE — ASU DISCHARGE PLAN (ADULT/PEDIATRIC) - CARE PROVIDER_API CALL
Silvia Roque)  Surgery  [address] [place] [address]  Phone: [phone]  Fax: [phone]  Established Patient  Follow Up Time: 2 weeks

## 2020-03-30 NOTE — ASU PATIENT PROFILE, ADULT - PSH
Disorder of hair follicle  bilateral axilla  Encounter for central line care  [facility] - 10/2019  H/O arthroscopy of right knee    History of bladder surgery  sling  S/P dilatation and curettage

## 2020-04-01 ENCOUNTER — OUTPATIENT (OUTPATIENT)
Dept: OUTPATIENT SERVICES | Facility: HOSPITAL | Age: 55
LOS: 1 days | Discharge: HOME | End: 2020-04-01

## 2020-04-01 DIAGNOSIS — L73.9 FOLLICULAR DISORDER, UNSPECIFIED: Chronic | ICD-10-CM

## 2020-04-01 DIAGNOSIS — Z98.890 OTHER SPECIFIED POSTPROCEDURAL STATES: Chronic | ICD-10-CM

## 2020-04-01 DIAGNOSIS — Z45.2 ENCOUNTER FOR ADJUSTMENT AND MANAGEMENT OF VASCULAR ACCESS DEVICE: Chronic | ICD-10-CM

## 2020-04-01 DIAGNOSIS — C50.919 MALIGNANT NEOPLASM OF UNSPECIFIED SITE OF UNSPECIFIED FEMALE BREAST: ICD-10-CM

## 2020-04-01 PROBLEM — Z87.448 PERSONAL HISTORY OF OTHER DISEASES OF URINARY SYSTEM: Chronic | Status: ACTIVE | Noted: 2020-03-26

## 2020-04-01 PROBLEM — M19.90 UNSPECIFIED OSTEOARTHRITIS, UNSPECIFIED SITE: Chronic | Status: ACTIVE | Noted: 2017-07-31

## 2020-04-02 DIAGNOSIS — Z85.3 PERSONAL HISTORY OF MALIGNANT NEOPLASM OF BREAST: ICD-10-CM

## 2020-04-02 DIAGNOSIS — G47.00 INSOMNIA, UNSPECIFIED: ICD-10-CM

## 2020-04-02 DIAGNOSIS — E28.2 POLYCYSTIC OVARIAN SYNDROME: ICD-10-CM

## 2020-04-02 DIAGNOSIS — N60.22 FIBROADENOSIS OF LEFT BREAST: ICD-10-CM

## 2020-04-02 DIAGNOSIS — N60.32 FIBROSCLEROSIS OF LEFT BREAST: ICD-10-CM

## 2020-04-02 DIAGNOSIS — Z87.891 PERSONAL HISTORY OF NICOTINE DEPENDENCE: ICD-10-CM

## 2020-04-02 DIAGNOSIS — E11.9 TYPE 2 DIABETES MELLITUS WITHOUT COMPLICATIONS: ICD-10-CM

## 2020-04-02 DIAGNOSIS — M17.0 BILATERAL PRIMARY OSTEOARTHRITIS OF KNEE: ICD-10-CM

## 2020-04-02 DIAGNOSIS — C50.912 MALIGNANT NEOPLASM OF UNSPECIFIED SITE OF LEFT FEMALE BREAST: ICD-10-CM

## 2020-04-02 DIAGNOSIS — Z92.21 PERSONAL HISTORY OF ANTINEOPLASTIC CHEMOTHERAPY: ICD-10-CM

## 2020-04-07 ENCOUNTER — APPOINTMENT (OUTPATIENT)
Dept: SURGERY | Facility: CLINIC | Age: 55
End: 2020-04-07
Payer: COMMERCIAL

## 2020-04-07 DIAGNOSIS — Z98.890 OTHER SPECIFIED POSTPROCEDURAL STATES: ICD-10-CM

## 2020-04-07 PROCEDURE — 99215 OFFICE O/P EST HI 40 MIN: CPT | Mod: 95

## 2020-04-07 NOTE — ASSESSMENT
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   No residual carcinoma was seen on final pathology and 0/1 lymph nodes were negative for carcinoma.  Plan for bilateral skin sparring mastectomy and HARSHIL reconstruction in September/October and will be coordinated with .  Recommendation for MRI breast in June 2020.\par 1.  Plan for bilateral skin sparring mastectomy with left SLNB possible left ALNB and HARSHIL\par 2.  Followup with Dr. Daniels\par 3.  Followup with Dr. Dailey\par 4.  Biopsy of left nipple \par 5.  MRI June 2020

## 2020-04-07 NOTE — HISTORY OF PRESENT ILLNESS
[Home] : at home, [unfilled] , at the time of the visit. [Other Location: e.g. Home (Enter Location, City,State)___] : at [unfilled] [Patient] : the patient [FreeTextEntry2] : Nikki Pak [FreeTextEntry1] : I had the pleasure of speaking to Nikki Pak, s/p left breast lumpectomy with SLNB performed on 3/30/2020.\par \par Nikki is accompanied by her  Presley. She is a nael 56 yo nulliparous  female who has been in stable overall health.\par \par She palpated a left breast mass in the shower on labor day and underwent diagnostic mammogram/ultrasound 9/13. A biopsy was performed demonstrating poorly differentiated IDC [id number] negative with DCIS measuring 1.5 cm.\par \par Imaging : \par [facility]  -MR Breast WAIC BI W CAD #\par MRI 9/20/19\par 1. Biopsy-proven invasive ductal carcinoma/ductal carcinoma in situ measuring up to 4.5 cm the upper central left breast, as above. Additional nonmass enhancement is noted extending from the superior aspect of the mass posteriorly by approximately 3 cm. Of note, enhancement on MRI in this location is significantly larger than reported on outside imagining reports. Wide excision is recommended. Alternatively, MR guided biopsy of additional nonmass enhancement can be performed if will alter management. The patient is advised to submit prior examinations for comparison at which time an addendum to the current be issued. \par 2. No evidence of suspicious enhancement in the contralateral right breast.\par \par  [facility]\par Bilateral diagnostic mammogram/ultrasound 9/13/2019\par Impression: New 1.5cm solid mass left breast 12:00 Imaging features are indeterminate. No suspicious finding in the right breast. BIRADS 4: Suspicious\par US: Left breast 12:00 7cm FN 1.5cm hypoechoic round mass with slightly angular margins corresponding to the new mass on mammography. Imaging features are indeterminate. BIRADS 4: Suspicious\par \par Pathology: Left breast 12:00 7cm FN\par Poorly differentiated IDC ER- NM- [id number] - with DCIS \par \par MIDSTAGING MRI BREAST:\par 1/3/202  MR breast IC BI CAD\par Impression:  Near complete resolution of enhancement/mass at the 12:00 position of the left breast.  Nonspecific 4 mm enhancing focus within the left nipple.  If it would alter management, consider surgical sampling for further evaluation.  This is not amenable to image guided biopsy.  BIRADS 4A low suspicion.\par \par 3/6/2020  MR Breast\par Impression:  Left breast malignancy status post neoadjuvant chemotherapy.  There has been resolution of previously seen enhancement in the let upper central breast.  Again noted is a stable 4 mm enhancing focus with the left nipple.  As previously discussed, this findings is not amendable to image guided biopsy.  BIRADS 6\par \par 3/30/2020\par 1.  Left axilla, sentinel lymph node #1:  One lymph node negative for metastatic carcinoma (0/1).\par 2.  Breast, left (lumpectomy):  Adenosis, small cyst, stromal fibrosis, and microcalcifications.  fibroadenoma, 0.2 cm.  Biopsy site changes.  Residual carcinoma is not identified.\par 3.  Breast, left anterior margin of lumpectomy.  Breast and fibroadipose tissue, negative for malignancy.\par 4.  Breast, left, lateral margin of lumpectomy:  Breast and fibroadipose tissue, negative for malignancy.\par 5.  Breast, left, superior margin of lumpectomy:  Breast and fibroadipose tissue, negative for malignancy.\par 6.  Breast, left, posterior/deep margin of lumpectomy:  Fibroadipose tissue, negative for malignancy.\par 7.  Breast, left medial margin of lumpectomy:  Breast and fibroadipose tissue, negative for malignancy.\par 8.  Breast left inferior margin of lumpectomy:  Fibroadipose tissue negative for malignancy.\par \par We reviewed and discussed final pathology.  She understands there was no residual carcinoma on final pathology and 0/1 lymph nodes were negative for carcinoma.  She does not report any s/s of infection.  We discussed her reconstruction HARSHIL/bilateral mastectomy surgery will most likely be in September/October.  Recommendation for follow up with medical oncology and MRI in 3 months ( June 2020).\par

## 2020-04-09 ENCOUNTER — APPOINTMENT (OUTPATIENT)
Dept: HEMATOLOGY ONCOLOGY | Facility: CLINIC | Age: 55
End: 2020-04-09
Payer: COMMERCIAL

## 2020-04-09 ENCOUNTER — APPOINTMENT (OUTPATIENT)
Age: 55
End: 2020-04-09

## 2020-04-09 PROCEDURE — 99213 OFFICE O/P EST LOW 20 MIN: CPT

## 2020-04-14 ENCOUNTER — APPOINTMENT (OUTPATIENT)
Dept: SURGERY | Facility: CLINIC | Age: 55
End: 2020-04-14

## 2020-05-08 ENCOUNTER — OUTPATIENT (OUTPATIENT)
Dept: OUTPATIENT SERVICES | Facility: HOSPITAL | Age: 55
LOS: 1 days | Discharge: HOME | End: 2020-05-08

## 2020-05-08 DIAGNOSIS — Z98.890 OTHER SPECIFIED POSTPROCEDURAL STATES: Chronic | ICD-10-CM

## 2020-05-08 DIAGNOSIS — L73.9 FOLLICULAR DISORDER, UNSPECIFIED: Chronic | ICD-10-CM

## 2020-05-08 DIAGNOSIS — Z45.2 ENCOUNTER FOR ADJUSTMENT AND MANAGEMENT OF VASCULAR ACCESS DEVICE: Chronic | ICD-10-CM

## 2020-05-08 DIAGNOSIS — C50.919 MALIGNANT NEOPLASM OF UNSPECIFIED SITE OF UNSPECIFIED FEMALE BREAST: ICD-10-CM

## 2020-05-14 ENCOUNTER — RESULT REVIEW (OUTPATIENT)
Age: 55
End: 2020-05-14

## 2020-05-14 ENCOUNTER — LABORATORY RESULT (OUTPATIENT)
Age: 55
End: 2020-05-14

## 2020-05-14 ENCOUNTER — APPOINTMENT (OUTPATIENT)
Age: 55
End: 2020-05-14

## 2020-05-14 LAB
BASOPHILS # BLD AUTO: 0.1 K/UL — SIGNIFICANT CHANGE UP (ref 0–0.2)
BASOPHILS NFR BLD AUTO: 1.4 % — SIGNIFICANT CHANGE UP (ref 0–2)
EOSINOPHIL # BLD AUTO: 0.1 K/UL — SIGNIFICANT CHANGE UP (ref 0–0.5)
EOSINOPHIL NFR BLD AUTO: 1.8 % — SIGNIFICANT CHANGE UP (ref 0–6)
HCT VFR BLD CALC: 40.6 % — SIGNIFICANT CHANGE UP (ref 34.5–45)
HGB BLD-MCNC: 13.3 G/DL — SIGNIFICANT CHANGE UP (ref 11.5–15.5)
LYMPHOCYTES # BLD AUTO: 1.3 K/UL — SIGNIFICANT CHANGE UP (ref 1–3.3)
LYMPHOCYTES # BLD AUTO: 27.3 % — SIGNIFICANT CHANGE UP (ref 13–44)
MCHC RBC-ENTMCNC: 28.8 PG — SIGNIFICANT CHANGE UP (ref 27–34)
MCHC RBC-ENTMCNC: 32.7 G/DL — SIGNIFICANT CHANGE UP (ref 32–36)
MCV RBC AUTO: 87.9 FL — SIGNIFICANT CHANGE UP (ref 80–100)
MONOCYTES # BLD AUTO: 0.5 K/UL — SIGNIFICANT CHANGE UP (ref 0–0.9)
MONOCYTES NFR BLD AUTO: 10.9 % — SIGNIFICANT CHANGE UP (ref 2–14)
NEUTROPHILS # BLD AUTO: 2.9 K/UL — SIGNIFICANT CHANGE UP (ref 1.8–7.4)
NEUTROPHILS NFR BLD AUTO: 58.6 % — SIGNIFICANT CHANGE UP (ref 43–77)
PLATELET # BLD AUTO: 283 K/UL — SIGNIFICANT CHANGE UP (ref 150–400)
RBC # BLD: 4.62 M/UL — SIGNIFICANT CHANGE UP (ref 3.8–5.2)
RBC # FLD: 13.7 % — SIGNIFICANT CHANGE UP (ref 10.3–14.5)
WBC # BLD: 4.9 K/UL — SIGNIFICANT CHANGE UP (ref 3.8–10.5)
WBC # FLD AUTO: 4.9 K/UL — SIGNIFICANT CHANGE UP (ref 3.8–10.5)

## 2020-06-02 ENCOUNTER — OUTPATIENT (OUTPATIENT)
Dept: OUTPATIENT SERVICES | Facility: HOSPITAL | Age: 55
LOS: 1 days | End: 2020-06-02

## 2020-06-02 DIAGNOSIS — Z45.2 ENCOUNTER FOR ADJUSTMENT AND MANAGEMENT OF VASCULAR ACCESS DEVICE: Chronic | ICD-10-CM

## 2020-06-02 DIAGNOSIS — Z98.890 OTHER SPECIFIED POSTPROCEDURAL STATES: Chronic | ICD-10-CM

## 2020-06-02 DIAGNOSIS — Z00.00 ENCOUNTER FOR GENERAL ADULT MEDICAL EXAMINATION WITHOUT ABNORMAL FINDINGS: ICD-10-CM

## 2020-06-02 DIAGNOSIS — Z98.890 OTHER SPECIFIED POSTPROCEDURAL STATES: ICD-10-CM

## 2020-06-02 DIAGNOSIS — L73.9 FOLLICULAR DISORDER, UNSPECIFIED: Chronic | ICD-10-CM

## 2020-06-03 ENCOUNTER — APPOINTMENT (OUTPATIENT)
Dept: MRI IMAGING | Facility: CLINIC | Age: 55
End: 2020-06-03

## 2020-06-22 ENCOUNTER — OUTPATIENT (OUTPATIENT)
Dept: OUTPATIENT SERVICES | Facility: HOSPITAL | Age: 55
LOS: 1 days | End: 2020-06-22
Payer: COMMERCIAL

## 2020-06-22 VITALS
WEIGHT: 264.55 LBS | RESPIRATION RATE: 16 BRPM | HEIGHT: 70 IN | OXYGEN SATURATION: 98 % | SYSTOLIC BLOOD PRESSURE: 138 MMHG | TEMPERATURE: 98 F | HEART RATE: 94 BPM | DIASTOLIC BLOOD PRESSURE: 81 MMHG

## 2020-06-22 DIAGNOSIS — L73.9 FOLLICULAR DISORDER, UNSPECIFIED: Chronic | ICD-10-CM

## 2020-06-22 DIAGNOSIS — C50.919 MALIGNANT NEOPLASM OF UNSPECIFIED SITE OF UNSPECIFIED FEMALE BREAST: ICD-10-CM

## 2020-06-22 DIAGNOSIS — E11.9 TYPE 2 DIABETES MELLITUS WITHOUT COMPLICATIONS: ICD-10-CM

## 2020-06-22 DIAGNOSIS — Z01.818 ENCOUNTER FOR OTHER PREPROCEDURAL EXAMINATION: ICD-10-CM

## 2020-06-22 DIAGNOSIS — Z98.890 OTHER SPECIFIED POSTPROCEDURAL STATES: Chronic | ICD-10-CM

## 2020-06-22 DIAGNOSIS — C50.912 MALIGNANT NEOPLASM OF UNSPECIFIED SITE OF LEFT FEMALE BREAST: ICD-10-CM

## 2020-06-22 DIAGNOSIS — Z45.2 ENCOUNTER FOR ADJUSTMENT AND MANAGEMENT OF VASCULAR ACCESS DEVICE: Chronic | ICD-10-CM

## 2020-06-22 LAB
BLD GP AB SCN SERPL QL: SIGNIFICANT CHANGE UP
HCG SERPL-ACNC: <1 MIU/ML — SIGNIFICANT CHANGE UP
SARS-COV-2 RNA SPEC QL NAA+PROBE: SIGNIFICANT CHANGE UP

## 2020-06-22 NOTE — H&P PST ADULT - ATTENDING COMMENTS
Patient with history of left breast cancer treated with neoadjuvant chemotherapy, underwent left breast conservation with leonel  wide lumpectomy and sentinel node biopsy (secondary to staged- COVID procedure) and now presents for completion skin sparing bilateral mastectomy with HARSHIL reconstruction. She understands alternative to surgical procedure as well as risks v benefits and technical aspects. All questions were answered.

## 2020-06-22 NOTE — H&P PST ADULT - NSANTHOSAYNRD_GEN_A_CORE
neck 15.75 inches/No. REYNALDO screening performed.  STOP BANG Legend: 0-2 = LOW Risk; 3-4 = INTERMEDIATE Risk; 5-8 = HIGH Risk

## 2020-06-22 NOTE — H&P PST ADULT - NSICDXPROBLEM_GEN_ALL_CORE_FT
PROBLEM DIAGNOSES  Problem: Malignant neoplasm of unspecified site of unspecified female breast  Assessment and Plan: Scheduled for bilateral skin sparing mastectomy - HARSHIL with Dr Roque on 06/24/2020.  Pre op instructions givena and patient verbalized understanding.  Chlorhexidine wash and instructions given.  CBC, BMP, EKG on chart.  HGC, COVID 19 and T&S pending.    Problem: Diabetes  Assessment and Plan: Takes medicatiosn as prescribed, Instructed to hold metformin AM of procedure, BS AM of procedure

## 2020-06-22 NOTE — H&P PST ADULT - NSICDXPASTSURGICALHX_GEN_ALL_CORE_FT
PAST SURGICAL HISTORY:  Disorder of hair follicle bilateral axilla    Encounter for central line care [facility] - 10/2019    H/O arthroscopy of right knee     History of bladder surgery sling    S/P dilatation and curettage

## 2020-06-22 NOTE — H&P PST ADULT - HISTORY OF PRESENT ILLNESS
54 y/o female with PMHx of diabetes presents PST and COVID 19 testing.  Patient reports dx of Left breast cancer dx September 2019.  Pt s/p chemo which ended February 2020.  Pt is now scheduled for Bilateral Mastectomy with Anali Flap Breast Reconstruction, Intravenous Angiography with Dr Roque on 06/24/2020.

## 2020-06-23 ENCOUNTER — RESULT REVIEW (OUTPATIENT)
Age: 55
End: 2020-06-23

## 2020-06-23 ENCOUNTER — TRANSCRIPTION ENCOUNTER (OUTPATIENT)
Age: 55
End: 2020-06-23

## 2020-06-23 PROCEDURE — 36415 COLL VENOUS BLD VENIPUNCTURE: CPT

## 2020-06-23 PROCEDURE — U0003: CPT

## 2020-06-23 PROCEDURE — 86901 BLOOD TYPING SEROLOGIC RH(D): CPT

## 2020-06-23 PROCEDURE — 86900 BLOOD TYPING SEROLOGIC ABO: CPT

## 2020-06-23 PROCEDURE — 84702 CHORIONIC GONADOTROPIN TEST: CPT

## 2020-06-23 PROCEDURE — 86850 RBC ANTIBODY SCREEN: CPT

## 2020-06-23 PROCEDURE — G0463: CPT

## 2020-06-23 PROCEDURE — 88321 CONSLTJ&REPRT SLD PREP ELSWR: CPT

## 2020-06-23 RX ORDER — OXYCODONE AND ACETAMINOPHEN 5; 325 MG/1; MG/1
5-325 TABLET ORAL
Qty: 30 | Refills: 0 | Status: DISCONTINUED | COMMUNITY
Start: 2020-03-18 | End: 2020-06-23

## 2020-06-23 RX ORDER — DOCUSATE SODIUM 100 MG/1
100 CAPSULE ORAL TWICE DAILY
Qty: 20 | Refills: 0 | Status: DISCONTINUED | COMMUNITY
Start: 2020-03-18 | End: 2020-06-23

## 2020-06-23 RX ORDER — ONDANSETRON 4 MG/1
4 TABLET, ORALLY DISINTEGRATING ORAL EVERY 6 HOURS
Qty: 30 | Refills: 0 | Status: DISCONTINUED | COMMUNITY
Start: 2020-03-18 | End: 2020-06-23

## 2020-06-23 RX ORDER — OXYCODONE AND ACETAMINOPHEN 5; 325 MG/1; MG/1
5-325 TABLET ORAL
Qty: 25 | Refills: 0 | Status: DISCONTINUED | COMMUNITY
Start: 2020-03-30 | End: 2020-06-23

## 2020-06-23 RX ORDER — KETOROLAC TROMETHAMINE 10 MG/1
10 TABLET, FILM COATED ORAL EVERY 6 HOURS
Qty: 20 | Refills: 0 | Status: DISCONTINUED | COMMUNITY
Start: 2020-03-18 | End: 2020-06-23

## 2020-06-24 ENCOUNTER — RESULT REVIEW (OUTPATIENT)
Age: 55
End: 2020-06-24

## 2020-06-24 ENCOUNTER — APPOINTMENT (OUTPATIENT)
Dept: PLASTIC SURGERY | Facility: HOSPITAL | Age: 55
End: 2020-06-24
Payer: COMMERCIAL

## 2020-06-24 ENCOUNTER — INPATIENT (INPATIENT)
Facility: HOSPITAL | Age: 55
LOS: 1 days | Discharge: HOME | DRG: 583 | End: 2020-06-26
Attending: SURGERY | Admitting: SURGERY
Payer: COMMERCIAL

## 2020-06-24 ENCOUNTER — APPOINTMENT (OUTPATIENT)
Dept: SURGERY | Facility: HOSPITAL | Age: 55
End: 2020-06-24

## 2020-06-24 VITALS
WEIGHT: 264.11 LBS | OXYGEN SATURATION: 97 % | DIASTOLIC BLOOD PRESSURE: 70 MMHG | HEIGHT: 70 IN | SYSTOLIC BLOOD PRESSURE: 111 MMHG | TEMPERATURE: 98 F | RESPIRATION RATE: 16 BRPM | HEART RATE: 81 BPM

## 2020-06-24 DIAGNOSIS — C50.919 MALIGNANT NEOPLASM OF UNSPECIFIED SITE OF UNSPECIFIED FEMALE BREAST: ICD-10-CM

## 2020-06-24 DIAGNOSIS — Z45.2 ENCOUNTER FOR ADJUSTMENT AND MANAGEMENT OF VASCULAR ACCESS DEVICE: Chronic | ICD-10-CM

## 2020-06-24 DIAGNOSIS — L73.9 FOLLICULAR DISORDER, UNSPECIFIED: Chronic | ICD-10-CM

## 2020-06-24 DIAGNOSIS — Z98.890 OTHER SPECIFIED POSTPROCEDURAL STATES: Chronic | ICD-10-CM

## 2020-06-24 PROCEDURE — S2068: CPT | Mod: RT

## 2020-06-24 PROCEDURE — 19303 MAST SIMPLE COMPLETE: CPT | Mod: AS,50,58

## 2020-06-24 PROCEDURE — S2068: CPT | Mod: LT

## 2020-06-24 PROCEDURE — 38530 BIOPSY/REMOVAL LYMPH NODES: CPT | Mod: RT

## 2020-06-24 PROCEDURE — 88305 TISSUE EXAM BY PATHOLOGIST: CPT | Mod: 26

## 2020-06-24 PROCEDURE — 19303 MAST SIMPLE COMPLETE: CPT | Mod: 50,58

## 2020-06-24 PROCEDURE — 38530 BIOPSY/REMOVAL LYMPH NODES: CPT | Mod: LT

## 2020-06-24 PROCEDURE — 88307 TISSUE EXAM BY PATHOLOGIST: CPT | Mod: 26

## 2020-06-24 RX ORDER — ACETAMINOPHEN 500 MG
650 TABLET ORAL EVERY 6 HOURS
Refills: 0 | Status: COMPLETED | OUTPATIENT
Start: 2020-06-24 | End: 2021-05-23

## 2020-06-24 RX ORDER — HYDROMORPHONE HYDROCHLORIDE 2 MG/ML
0.5 INJECTION INTRAMUSCULAR; INTRAVENOUS; SUBCUTANEOUS
Refills: 0 | Status: DISCONTINUED | OUTPATIENT
Start: 2020-06-24 | End: 2020-06-24

## 2020-06-24 RX ORDER — FAMOTIDINE 10 MG/ML
20 INJECTION INTRAVENOUS
Refills: 0 | Status: DISCONTINUED | OUTPATIENT
Start: 2020-06-24 | End: 2020-06-24

## 2020-06-24 RX ORDER — SENNA PLUS 8.6 MG/1
2 TABLET ORAL AT BEDTIME
Refills: 0 | Status: DISCONTINUED | OUTPATIENT
Start: 2020-06-24 | End: 2020-06-26

## 2020-06-24 RX ORDER — KETOROLAC TROMETHAMINE 30 MG/ML
15 SYRINGE (ML) INJECTION EVERY 6 HOURS
Refills: 0 | Status: DISCONTINUED | OUTPATIENT
Start: 2020-06-24 | End: 2020-06-26

## 2020-06-24 RX ORDER — ONDANSETRON 8 MG/1
4 TABLET, FILM COATED ORAL ONCE
Refills: 0 | Status: DISCONTINUED | OUTPATIENT
Start: 2020-06-24 | End: 2020-06-24

## 2020-06-24 RX ORDER — FAMOTIDINE 10 MG/ML
20 INJECTION INTRAVENOUS
Refills: 0 | Status: DISCONTINUED | OUTPATIENT
Start: 2020-06-24 | End: 2020-06-26

## 2020-06-24 RX ORDER — SODIUM CHLORIDE 9 MG/ML
1000 INJECTION, SOLUTION INTRAVENOUS
Refills: 0 | Status: DISCONTINUED | OUTPATIENT
Start: 2020-06-24 | End: 2020-06-25

## 2020-06-24 RX ORDER — SODIUM CHLORIDE 9 MG/ML
1000 INJECTION, SOLUTION INTRAVENOUS
Refills: 0 | Status: DISCONTINUED | OUTPATIENT
Start: 2020-06-24 | End: 2020-06-24

## 2020-06-24 RX ORDER — CEFAZOLIN SODIUM 1 G
2000 VIAL (EA) INJECTION EVERY 8 HOURS
Refills: 0 | Status: COMPLETED | OUTPATIENT
Start: 2020-06-24 | End: 2020-06-25

## 2020-06-24 RX ORDER — ONDANSETRON 8 MG/1
4 TABLET, FILM COATED ORAL EVERY 6 HOURS
Refills: 0 | Status: DISCONTINUED | OUTPATIENT
Start: 2020-06-24 | End: 2020-06-26

## 2020-06-24 RX ORDER — ACETAMINOPHEN 500 MG
1000 TABLET ORAL EVERY 6 HOURS
Refills: 0 | Status: COMPLETED | OUTPATIENT
Start: 2020-06-24 | End: 2020-06-25

## 2020-06-24 RX ORDER — OXYCODONE HYDROCHLORIDE 5 MG/1
5 TABLET ORAL EVERY 4 HOURS
Refills: 0 | Status: DISCONTINUED | OUTPATIENT
Start: 2020-06-24 | End: 2020-06-26

## 2020-06-24 RX ORDER — HYDROMORPHONE HYDROCHLORIDE 2 MG/ML
1 INJECTION INTRAMUSCULAR; INTRAVENOUS; SUBCUTANEOUS
Refills: 0 | Status: DISCONTINUED | OUTPATIENT
Start: 2020-06-24 | End: 2020-06-24

## 2020-06-24 RX ORDER — ENOXAPARIN SODIUM 100 MG/ML
60 INJECTION SUBCUTANEOUS DAILY
Refills: 0 | Status: DISCONTINUED | OUTPATIENT
Start: 2020-06-25 | End: 2020-06-26

## 2020-06-24 RX ADMIN — ONDANSETRON 4 MILLIGRAM(S): 8 TABLET, FILM COATED ORAL at 20:21

## 2020-06-24 RX ADMIN — Medication 100 MILLIGRAM(S): at 21:00

## 2020-06-24 RX ADMIN — Medication 15 MILLIGRAM(S): at 23:17

## 2020-06-24 RX ADMIN — OXYCODONE HYDROCHLORIDE 5 MILLIGRAM(S): 5 TABLET ORAL at 20:21

## 2020-06-24 RX ADMIN — Medication 400 MILLIGRAM(S): at 20:21

## 2020-06-24 RX ADMIN — SODIUM CHLORIDE 75 MILLILITER(S): 9 INJECTION, SOLUTION INTRAVENOUS at 18:07

## 2020-06-24 RX ADMIN — SODIUM CHLORIDE 50 MILLILITER(S): 9 INJECTION, SOLUTION INTRAVENOUS at 06:42

## 2020-06-24 RX ADMIN — SODIUM CHLORIDE 75 MILLILITER(S): 9 INJECTION, SOLUTION INTRAVENOUS at 20:20

## 2020-06-24 NOTE — ASU PATIENT PROFILE, ADULT - PSH
Disorder of hair follicle  bilateral axilla 1996  Encounter for central line care  [facility] - 10/2019  H/O arthroscopy of right knee  2015  History of bladder surgery  sling 2018  History of lumpectomy of left breast  March 20020

## 2020-06-24 NOTE — ASU PATIENT PROFILE, ADULT - PMH
Arthritis  bilateral knees  Breast cancer  left  History of pituitary tumor  small, no current treatment  History of prolapse of bladder  sling  History of uterine fibroid    PCOS (polycystic ovarian syndrome)    Prediabetes

## 2020-06-24 NOTE — PROGRESS NOTE ADULT - ASSESSMENT
55F S/p HARSHIL of left breast for Hx of breast CA.     Events last 24 hours:   -patient arrived to ICU from PACU at shift change, currently awake, alert and pleassant  -flap site apears clean, dry, intact, with vioptic tissue oxygenation monitoring in place    PLAN;  -monitor in ICU   -will need consistent, Q1H tissue oxygenation monitoring, any drop noted within 10 points to be escalated to primary surgeon  -Q1H vital signs, monitor CBC, currently no signs of bleeding  -monitor JENIFFER drains every hour, if increased drainage will send reflex CBC to monitor Hb, transfuse for hb <8,   -pain control  -post op anbx as indicated by primary surgical team  -AM labs  -start diet when tolerating  -DVT prophylaxis  -if any acute changes or concerns arise, have discussed with bedside RN, they will place call to primary surgeon, and then we will address issue bedside until surgeon provides further instructions.

## 2020-06-24 NOTE — PROGRESS NOTE ADULT - SUBJECTIVE AND OBJECTIVE BOX
Patient is a 55y old  Female who presents with a chief complaint of malignant neoplasm of unspecified site of unspecified female breast (22 Jun 2020 08:41)      BRIEF HOSPITAL COURSE:     55F S/p HARSHIL of left breast for Hx of breast CA.     Events last 24 hours:   -patient arrived to ICU from PACU at shift change, currently awake, alert and pleassant  -flap site apears clean, dry, intact, with vioptic tissue oxygenation monitoring in place    PAST MEDICAL & SURGICAL HISTORY:  History of prolapse of bladder: sling  Prediabetes  History of pituitary tumor: small, no current treatment  History of uterine fibroid  Breast cancer: left  PCOS (polycystic ovarian syndrome)  Arthritis: bilateral knees  History of lumpectomy of left breast: March 20020  History of bladder surgery: sling 2018  Encounter for central line care: [facility] 10/2019  Disorder of hair follicle: bilateral axilla 1996  H/O arthroscopy of right knee: 2015      Review of Systems:  CONSTITUTIONAL: No fever, chills, or fatigue  EYES: No eye pain, visual disturbances, or discharge  ENMT:  No difficulty hearing, tinnitus, vertigo; No sinus or throat pain  NECK: No pain or stiffness  RESPIRATORY: No cough, wheezing, chills or hemoptysis; No shortness of breath  CARDIOVASCULAR: No chest pain, palpitations, dizziness, or leg swelling  GASTROINTESTINAL: No abdominal or epigastric pain. No nausea, vomiting, or hematemesis; No diarrhea or constipation. No melena or hematochezia.  GENITOURINARY: No dysuria, frequency, hematuria, or incontinence  NEUROLOGICAL: No headaches, memory loss, loss of strength, numbness, or tremors  SKIN: No itching, burning, rashes, or lesions   MUSCULOSKELETAL: No joint pain or swelling; No muscle, back, or extremity pain  PSYCHIATRIC: No depression, anxiety, mood swings, or difficulty sleeping      Medications:  ceFAZolin   IVPB 2000 milliGRAM(s) IV Intermittent every 8 hours        acetaminophen   Tablet .. 650 milliGRAM(s) Oral every 6 hours  acetaminophen  IVPB .. 1000 milliGRAM(s) IV Intermittent every 6 hours  ketorolac   Injectable 15 milliGRAM(s) IV Push every 6 hours  ondansetron Injectable 4 milliGRAM(s) IV Push every 6 hours PRN  oxyCODONE    IR 5 milliGRAM(s) Oral every 4 hours PRN        famotidine Injectable 20 milliGRAM(s) IV Push two times a day  senna 2 Tablet(s) Oral at bedtime        lactated ringers. 1000 milliLiter(s) IV Continuous <Continuous>                ICU Vital Signs Last 24 Hrs  T(C): 37.2 (24 Jun 2020 19:15), Max: 37.2 (24 Jun 2020 19:15)  T(F): 98.9 (24 Jun 2020 19:15), Max: 98.9 (24 Jun 2020 19:15)  HR: 96 (24 Jun 2020 19:15) (81 - 96)  BP: 118/65 (24 Jun 2020 19:15) (111/70 - 130/75)  BP(mean): 77 (24 Jun 2020 19:15) (77 - 77)  RR: 22 (24 Jun 2020 19:15) (14 - 22)  SpO2: 95% (24 Jun 2020 19:15) (95% - 100%)          I&O's Detail    24 Jun 2020 07:01  -  24 Jun 2020 20:11  --------------------------------------------------------  IN:    lactated ringers.: 100 mL  Total IN: 100 mL    OUT:    Bulb: 30 mL    Bulb: 35 mL    Bulb: 2 mL    Bulb: 25 mL    Indwelling Catheter - Urethral: 300 mL  Total OUT: 392 mL    Total NET: -292 mL            LABS:                CAPILLARY BLOOD GLUCOSE      POCT Blood Glucose.: 161 mg/dL (24 Jun 2020 18:04)        CULTURES:      Physical Examination:    General: No acute distress.  Alert, oriented, interactive, nonfocal    HEENT: Pupils equal, reactive to light.  Symmetric.    PULM: Clear to auscultation bilaterally, no significant sputum production    CVS: Regular rate and rhythm, no murmurs, rubs, or gallops    ABD: Soft, nondistended, nontender, normoactive bowel sounds, no masses    EXT: No edema, nontender    SKIN: Warm and well perfused, no rashes noted. JENIFFER sites intact, sero-sang drainage. Flap appears clean, dry, intact

## 2020-06-24 NOTE — PRE-OP CHECKLIST - SELECT TESTS ORDERED
group and RH/POCT Blood Glucose group and RH sent to lab and blood sugar 78 at/POCT Blood Glucose group and RH sent to lab and blood sugar 78 at 6:38 am/POCT Blood Glucose

## 2020-06-25 RX ORDER — ACETAMINOPHEN 500 MG
650 TABLET ORAL EVERY 6 HOURS
Refills: 0 | Status: DISCONTINUED | OUTPATIENT
Start: 2020-06-25 | End: 2020-06-26

## 2020-06-25 RX ORDER — SODIUM CHLORIDE 9 MG/ML
1000 INJECTION, SOLUTION INTRAVENOUS ONCE
Refills: 0 | Status: COMPLETED | OUTPATIENT
Start: 2020-06-25 | End: 2020-06-25

## 2020-06-25 RX ORDER — SODIUM CHLORIDE 9 MG/ML
1000 INJECTION, SOLUTION INTRAVENOUS
Refills: 0 | Status: DISCONTINUED | OUTPATIENT
Start: 2020-06-25 | End: 2020-06-25

## 2020-06-25 RX ORDER — OXYCODONE HYDROCHLORIDE 5 MG/1
5 TABLET ORAL ONCE
Refills: 0 | Status: DISCONTINUED | OUTPATIENT
Start: 2020-06-25 | End: 2020-06-26

## 2020-06-25 RX ADMIN — ENOXAPARIN SODIUM 60 MILLIGRAM(S): 100 INJECTION SUBCUTANEOUS at 11:30

## 2020-06-25 RX ADMIN — Medication 15 MILLIGRAM(S): at 11:30

## 2020-06-25 RX ADMIN — Medication 15 MILLIGRAM(S): at 18:05

## 2020-06-25 RX ADMIN — Medication 15 MILLIGRAM(S): at 23:36

## 2020-06-25 RX ADMIN — Medication 650 MILLIGRAM(S): at 19:59

## 2020-06-25 RX ADMIN — ONDANSETRON 4 MILLIGRAM(S): 8 TABLET, FILM COATED ORAL at 02:08

## 2020-06-25 RX ADMIN — Medication 400 MILLIGRAM(S): at 14:30

## 2020-06-25 RX ADMIN — OXYCODONE HYDROCHLORIDE 5 MILLIGRAM(S): 5 TABLET ORAL at 06:51

## 2020-06-25 RX ADMIN — Medication 400 MILLIGRAM(S): at 08:15

## 2020-06-25 RX ADMIN — FAMOTIDINE 100 MILLIGRAM(S): 10 INJECTION INTRAVENOUS at 00:30

## 2020-06-25 RX ADMIN — SENNA PLUS 2 TABLET(S): 8.6 TABLET ORAL at 20:03

## 2020-06-25 RX ADMIN — FAMOTIDINE 100 MILLIGRAM(S): 10 INJECTION INTRAVENOUS at 11:30

## 2020-06-25 RX ADMIN — Medication 100 MILLIGRAM(S): at 05:00

## 2020-06-25 RX ADMIN — OXYCODONE HYDROCHLORIDE 5 MILLIGRAM(S): 5 TABLET ORAL at 00:15

## 2020-06-25 RX ADMIN — FAMOTIDINE 100 MILLIGRAM(S): 10 INJECTION INTRAVENOUS at 23:36

## 2020-06-25 RX ADMIN — Medication 400 MILLIGRAM(S): at 02:09

## 2020-06-25 RX ADMIN — SODIUM CHLORIDE 1000 MILLILITER(S): 9 INJECTION, SOLUTION INTRAVENOUS at 06:53

## 2020-06-25 RX ADMIN — Medication 15 MILLIGRAM(S): at 05:01

## 2020-06-25 RX ADMIN — ONDANSETRON 4 MILLIGRAM(S): 8 TABLET, FILM COATED ORAL at 21:19

## 2020-06-25 RX ADMIN — OXYCODONE HYDROCHLORIDE 5 MILLIGRAM(S): 5 TABLET ORAL at 21:19

## 2020-06-25 NOTE — PROGRESS NOTE ADULT - SUBJECTIVE AND OBJECTIVE BOX
patient doing well  no cp, no sob, no fevers, no calf pain, no headache  breasts soft. flaps viable. stable vioptix. audible doppler signal. NAC viable. no NAC and/or mastectomy ischemia.  abdomen soft. umbilicus viable. incisions intact    all surgical sites soft without erythema or evidence of hematoma  drain output appropriate

## 2020-06-25 NOTE — PROGRESS NOTE ADULT - ASSESSMENT
54 y/o F with a h/o breast CA, PCOS, POD#1 bilateral mastectomy/HARSHIL.    - actively monitoring Vioptix tissue oximetry of bilateral flaps  - will escalate sustained drops in oximetry and/or clinical evidence of tissue ischemia to primary surgeon  - monitor JENIFFER drain output, trend CBC  - DVT prophylaxis in place, monitor for evidence of bleeding  - pain control PRN  - advanced to regular diet    Case discussed in detail with surgical [place] CHRISTOPHER. 56 y/o F with a h/o breast CA, PCOS, POD#1 bilateral mastectomy/HARSHIL.    - actively monitoring Vioptix tissue oximetry of bilateral flaps  - will escalate sustained drops in oximetry and/or clinical evidence of tissue ischemia to primary surgeon  - monitor hemodynamics closely, maintain a MAP > 65  - monitor JENIFFER drain output, trend CBC  - DVT prophylaxis in place, monitor for evidence of bleeding  - pain control PRN  - advanced to regular diet    Case discussed in detail with surgical PAArgentina.

## 2020-06-25 NOTE — PROGRESS NOTE ADULT - ASSESSMENT
s/p bilateral HARSHIL, POD 1  plan:  d/c luna  OOB to chair  DVT chemoppx  use IS  encourage po water --> regular diet  cont vioptix

## 2020-06-25 NOTE — PROGRESS NOTE ADULT - SUBJECTIVE AND OBJECTIVE BOX
Patient is a 55y old  Female who presents with a chief complaint of malignant neoplasm of unspecified site of unspecified female breast (22 Jun 2020 08:41)      BRIEF HOSPITAL COURSE: 54 y/o F with a h/o breast CA, PCOS, admitted on 6/24 and is now POD#1 for bilateral mastectomy/HARSHIL.    Events last 24 hours: Patient is resting in bed comfortably. She had transient drop in bilateral Vioptix tissue oximetry when she got up and ambulated, but this appeared to be related to probe positioning. No clinical evidence of tissue ischemia. Evaluated and discussed with surgical PA and RN.        PAST MEDICAL & SURGICAL HISTORY:  History of prolapse of bladder: sling  Prediabetes  History of pituitary tumor: small, no current treatment  History of uterine fibroid  Breast cancer: left  PCOS (polycystic ovarian syndrome)  Arthritis: bilateral knees  History of lumpectomy of left breast: March 20020  History of bladder surgery: sling 2018  Encounter for central line care: [facility] - 10/2019  Disorder of hair follicle: bilateral axilla 1996  H/O arthroscopy of right knee: 2015      Review of Systems:  CONSTITUTIONAL: No fever, chills, or fatigue  EYES: No eye pain, visual disturbances, or discharge  ENMT:  No difficulty hearing, tinnitus, vertigo; No sinus or throat pain  NECK: No pain or stiffness  RESPIRATORY: No cough, wheezing, chills or hemoptysis; No shortness of breath  CARDIOVASCULAR: No chest pain, palpitations, dizziness, or leg swelling  GASTROINTESTINAL: No abdominal or epigastric pain. No nausea, vomiting, or hematemesis; No diarrhea or constipation. No melena or hematochezia.  GENITOURINARY: No dysuria, frequency, hematuria, or incontinence  NEUROLOGICAL: No headaches, memory loss, loss of strength, numbness, or tremors  SKIN: No itching, burning, rashes, or lesions   MUSCULOSKELETAL: No joint pain or swelling; No muscle, back, or extremity pain  PSYCHIATRIC: No depression, anxiety, mood swings, or difficulty sleeping      Medications:    acetaminophen   Tablet .. 650 milliGRAM(s) Oral every 6 hours  ketorolac   Injectable 15 milliGRAM(s) IV Push every 6 hours  ondansetron Injectable 4 milliGRAM(s) IV Push every 6 hours PRN  oxyCODONE    IR 5 milliGRAM(s) Oral every 4 hours PRN  oxyCODONE    IR 5 milliGRAM(s) Oral once  enoxaparin Injectable 60 milliGRAM(s) SubCutaneous daily  famotidine  IVPB 20 milliGRAM(s) IV Intermittent two times a day  senna 2 Tablet(s) Oral at bedtime        ICU Vital Signs Last 24 Hrs  T(C): 37.2 (25 Jun 2020 20:00), Max: 37.2 (25 Jun 2020 20:00)  T(F): 98.9 (25 Jun 2020 20:00), Max: 98.9 (25 Jun 2020 20:00)  HR: 94 (25 Jun 2020 20:00) (87 - 105)  BP: 125/58 (25 Jun 2020 20:00) (98/44 - 142/58)  BP(mean): 71 (25 Jun 2020 20:00) (55 - 76)  ABP: --  ABP(mean): --  RR: 16 (25 Jun 2020 20:00) (15 - 25)  SpO2: 99% (25 Jun 2020 20:00) (90% - 100%)          I&O's Detail    24 Jun 2020 07:01  -  25 Jun 2020 07:00  --------------------------------------------------------  IN:    lactated ringers.: 100 mL    lactated ringers.: 1975 mL    Oral Fluid: 460 mL    Solution: 50 mL    Solution: 200 mL    Solution: 100 mL  Total IN: 2885 mL    OUT:    Bulb: 95 mL    Bulb: 85 mL    Bulb: 42 mL    Bulb: 90 mL    Indwelling Catheter - Urethral: 1760 mL  Total OUT: 2072 mL    Total NET: 813 mL      25 Jun 2020 07:01  -  26 Jun 2020 00:05  --------------------------------------------------------  IN:    lactated ringers.: 675 mL    Oral Fluid: 250 mL    Solution: 100 mL    Solution: 200 mL  Total IN: 1225 mL    OUT:    Bulb: 70 mL    Bulb: 80 mL    Bulb: 75 mL    Bulb: 37.5 mL    Indwelling Catheter - Urethral: 1200 mL    Voided: 250 mL  Total OUT: 1712.5 mL    Total NET: -487.5 mL            LABS:                CAPILLARY BLOOD GLUCOSE      POCT Blood Glucose.: 161 mg/dL (24 Jun 2020 18:04)        CULTURES:        Physical Examination:    General: No acute distress.  Alert, oriented, interactive, nonfocal    HEENT: Pupils equal, reactive to light.  Symmetric.    PULM: Clear to auscultation bilaterally, no significant sputum production    CVS: Regular rate and rhythm, no murmurs, rubs, or gallops    ABD: Soft, nondistended, nontender, normoactive bowel sounds, no masses    EXT: No edema, nontender    SKIN: Warm and well perfused, no rashes noted. flaps c/d/i, JENIFFER drains with serosanguinous drainage    NEURO: A&Ox3, strength 5/5 all extremities, cranial nerves grossly intact, no focal deficits        RADIOLOGY:  n/a

## 2020-06-26 ENCOUNTER — TRANSCRIPTION ENCOUNTER (OUTPATIENT)
Age: 55
End: 2020-06-26

## 2020-06-26 VITALS
HEART RATE: 110 BPM | SYSTOLIC BLOOD PRESSURE: 132 MMHG | OXYGEN SATURATION: 99 % | DIASTOLIC BLOOD PRESSURE: 57 MMHG | RESPIRATION RATE: 20 BRPM

## 2020-06-26 PROBLEM — Z87.898 PERSONAL HISTORY OF OTHER SPECIFIED CONDITIONS: Chronic | Status: ACTIVE | Noted: 2020-03-26

## 2020-06-26 LAB
HCT VFR BLD CALC: 33.5 % — LOW (ref 34.5–45)
HGB BLD-MCNC: 10.6 G/DL — LOW (ref 11.5–15.5)
MCHC RBC-ENTMCNC: 28.8 PG — SIGNIFICANT CHANGE UP (ref 27–34)
MCHC RBC-ENTMCNC: 31.6 GM/DL — LOW (ref 32–36)
MCV RBC AUTO: 91 FL — SIGNIFICANT CHANGE UP (ref 80–100)
NRBC # BLD: 0 /100 WBCS — SIGNIFICANT CHANGE UP (ref 0–0)
PLATELET # BLD AUTO: 252 K/UL — SIGNIFICANT CHANGE UP (ref 150–400)
RBC # BLD: 3.68 M/UL — LOW (ref 3.8–5.2)
RBC # FLD: 14.2 % — SIGNIFICANT CHANGE UP (ref 10.3–14.5)
WBC # BLD: 7.11 K/UL — SIGNIFICANT CHANGE UP (ref 3.8–10.5)
WBC # FLD AUTO: 7.11 K/UL — SIGNIFICANT CHANGE UP (ref 3.8–10.5)

## 2020-06-26 PROCEDURE — 88307 TISSUE EXAM BY PATHOLOGIST: CPT

## 2020-06-26 PROCEDURE — 88305 TISSUE EXAM BY PATHOLOGIST: CPT

## 2020-06-26 PROCEDURE — 82962 GLUCOSE BLOOD TEST: CPT

## 2020-06-26 PROCEDURE — 87070 CULTURE OTHR SPECIMN AEROBIC: CPT

## 2020-06-26 PROCEDURE — C1781: CPT

## 2020-06-26 PROCEDURE — 36415 COLL VENOUS BLD VENIPUNCTURE: CPT

## 2020-06-26 PROCEDURE — 85027 COMPLETE CBC AUTOMATED: CPT

## 2020-06-26 PROCEDURE — C1889: CPT

## 2020-06-26 RX ORDER — ACETAMINOPHEN 500 MG
2 TABLET ORAL
Qty: 0 | Refills: 0 | DISCHARGE
Start: 2020-06-26

## 2020-06-26 RX ORDER — ONDANSETRON 8 MG/1
1 TABLET, FILM COATED ORAL
Qty: 0 | Refills: 0 | DISCHARGE

## 2020-06-26 RX ORDER — OXYCODONE HYDROCHLORIDE 5 MG/1
1 TABLET ORAL
Qty: 0 | Refills: 0 | DISCHARGE
Start: 2020-06-26

## 2020-06-26 RX ORDER — ACETAMINOPHEN 500 MG
1 TABLET ORAL
Qty: 0 | Refills: 0 | DISCHARGE

## 2020-06-26 RX ORDER — CELECOXIB 200 MG/1
1 CAPSULE ORAL
Qty: 0 | Refills: 0 | DISCHARGE

## 2020-06-26 RX ADMIN — Medication 650 MILLIGRAM(S): at 06:06

## 2020-06-26 RX ADMIN — OXYCODONE HYDROCHLORIDE 5 MILLIGRAM(S): 5 TABLET ORAL at 10:17

## 2020-06-26 RX ADMIN — OXYCODONE HYDROCHLORIDE 5 MILLIGRAM(S): 5 TABLET ORAL at 01:25

## 2020-06-26 RX ADMIN — Medication 15 MILLIGRAM(S): at 06:06

## 2020-06-26 NOTE — DISCHARGE NOTE PROVIDER - NSDCACTIVITY_GEN_ALL_CORE
Do not make important decisions/Stairs allowed/Walking - Outdoors allowed/Showering allowed/Do not drive or operate machinery/Walking - Indoors allowed/No heavy lifting/straining

## 2020-06-26 NOTE — DISCHARGE NOTE NURSING/CASE MANAGEMENT/SOCIAL WORK - NSDCFUADDAPPT_GEN_ALL_CORE_FT
As per Dr. Yash Daniels(in-person) , patient is to call his office later and schedule her follow-up for next week. [phone]

## 2020-06-26 NOTE — DISCHARGE NOTE PROVIDER - CARE PROVIDERS DIRECT ADDRESSES
,DirectAddress_Unknown,[email or web address]

## 2020-06-26 NOTE — DISCHARGE NOTE PROVIDER - CARE PROVIDER_API CALL
Yash Daniels  Plastic Surgery  [address] [facility] [place] [address]  Phone: [phone]  Fax: [phone]  Follow Up Time:     Silvia Roque  SURGERY  [address] [place] [id number]  Phone: [phone]  Fax: [phone]  Follow Up Time:

## 2020-06-26 NOTE — DISCHARGE NOTE PROVIDER - HOSPITAL COURSE
56y/o b/female with left breast cancer underwent bilat mastectomy with HARSHIL flap reconstruction on 6/24/20 performed by Dr. Rene (Breast) and Dr. Daniels (Plastics) respectively; post-op course uneventful; pt's wounds are healing well, flaps are viable; she is OOB, tolerating po diet and pain is controlled; she is medically/surgically stable for discharge home as per Dr. Daniels; she will f/u with her surgeons in 1 week and home care services have been arranged.

## 2020-06-26 NOTE — DISCHARGE NOTE NURSING/CASE MANAGEMENT/SOCIAL WORK - PATIENT PORTAL LINK FT
You can access the FollowMyHealth Patient Portal offered by [facility] by registering at the following website: [email or web address]. By joining [employer]’s FollowMyHealth portal, you will also be able to view your health information using other applications (apps) compatible with our system.

## 2020-06-26 NOTE — DISCHARGE NOTE PROVIDER - NSDCFUADDAPPT_GEN_ALL_CORE_FT
As per Dr. Yash Daniels(in-person) , patient is to call his office later and schedule her follow-up for next week. [phone]

## 2020-06-26 NOTE — DISCHARGE NOTE NURSING/CASE MANAGEMENT/SOCIAL WORK - NSDCPEEMAIL_GEN_ALL_CORE
[facility] email [email or web address]

## 2020-06-26 NOTE — DISCHARGE NOTE PROVIDER - NSDCHHASSISTILLNESS_GEN_ALL_CORE
recent bilat mastectomy with DEIP flap reconstruction, weakness, easy fatiguability, incisional pain

## 2020-06-26 NOTE — DISCHARGE NOTE PROVIDER - NSDCMRMEDTOKEN_GEN_ALL_CORE_FT
acetaminophen 325 mg oral tablet: 2 tab(s) orally every 6 hours, As Needed mild pain  metFORMIN 500 mg oral tablet: 1 tab(s) orally 2 times a day  Percocet 5/325 oral tablet: 1 tab(s) orally every 6 hours, As Needed for severe pain  Toradol 10 mg oral tablet: 1 tab(s) orally 4 times a day  Zofran 4 mg oral tablet: 1 tab(s) orally every 8 hours, As Needed for nausea/vomiting

## 2020-06-26 NOTE — BRIEF OPERATIVE NOTE - OPERATION/FINDINGS
Bilateral deep inferior epigastric  free flap, bilateral transabdominus plane blocks with injection of liposomal bupivacaine

## 2020-06-26 NOTE — DISCHARGE NOTE NURSING/CASE MANAGEMENT/SOCIAL WORK - NSDCPEWEB_GEN_ALL_CORE
[place] website --- [email or web address]/[email or web address] website --- [email or web address]

## 2020-06-26 NOTE — DISCHARGE NOTE PROVIDER - NSDCCPTREATMENT_GEN_ALL_CORE_FT
PRINCIPAL PROCEDURE  Procedure: Mastectomy, bilateral, with bilateral reconstruction using HARSHIL flap  Findings and Treatment:

## 2020-07-02 ENCOUNTER — APPOINTMENT (OUTPATIENT)
Dept: PLASTIC SURGERY | Facility: CLINIC | Age: 55
End: 2020-07-02
Payer: COMMERCIAL

## 2020-07-02 VITALS
OXYGEN SATURATION: 97 % | SYSTOLIC BLOOD PRESSURE: 122 MMHG | BODY MASS INDEX: 37.51 KG/M2 | TEMPERATURE: 98.3 F | WEIGHT: 262 LBS | DIASTOLIC BLOOD PRESSURE: 75 MMHG | HEIGHT: 70 IN | HEART RATE: 90 BPM

## 2020-07-02 DIAGNOSIS — Z09 ENCOUNTER FOR FOLLOW-UP EXAMINATION AFTER COMPLETED TREATMENT FOR CONDITIONS OTHER THAN MALIGNANT NEOPLASM: ICD-10-CM

## 2020-07-02 PROCEDURE — 99024 POSTOP FOLLOW-UP VISIT: CPT

## 2020-07-02 NOTE — REASON FOR VISIT
[Post Op: _________] : a [unfilled] post op visit [FreeTextEntry1] : DOS: 6/24/20s/p bilateral mastectomy with HARSHIL flap reconstruction. Pt is doing well reports having a pain level of 5, denies any other concerns.

## 2020-07-02 NOTE — PHYSICAL EXAM
[de-identified] : respirations are even and unlabored\par  [de-identified] : alert, calm, cooperative\par  [de-identified] : bilateral breast incisions are well-approximated, c/d/i, with Prineo in place.  There is moderate, generalized edema.  No signs of wound dehiscence or fluctuance. Flap is soft, skin paddle pink with good capillary refill. Bilateral Mahad drains are to self suction with serosanguineous fluid appreciated.\par  [de-identified] : transverse abdominal and umbilical incisions are well-approximated with moderate, generalized edema.  Incisions are c/d/i with Prineo in place.  No signs of wound dehiscence or fluctuance.  Bilateral Mahad drains are to self-suction with serosanguineous fluid present.\par

## 2020-07-02 NOTE — HISTORY OF PRESENT ILLNESS
[FreeTextEntry1] : Nikki is a 55 year old female who presents today with her  for a postop evaluation.  Patient has a history of triple negative cancer of the left breast. Pt states it was found through a mammogram, September 2019. Pt states having a sonogram, Biopsy in September 2019, MRI was done September 2019 and January 2020. Pt states having no history of breast cancer. Pt states having no family history of breast cancer. Pt denies breast pain, nipple discharge, nipple inversion or palpable mass. Pt report having genetic testing done and being negative for the BRCA gene. \par \par She is now s/p bilateral nipple-sparing mastectomies, performed by Dr. Roque, and bilateral immediate breast reconstruction using contralateral deep inferior epigastric  flaps to the internal mammary vessels and bilateral internal mammary lymph node biopsies with Dr. Daniels and Dr. Huntley on 6/24/20.  She has been healing well and denies any specific complaints or concerns.  Bilateral breast drains have been with >25ml of output over the past 24 hours.  Bilateral abdominal drains have been <25ml output over the past 24 hours.  She has been taking percocet twice a day for pain with good relief.  Denies any constipation and has been eating and drinking well.

## 2020-07-06 ENCOUNTER — APPOINTMENT (OUTPATIENT)
Dept: PLASTIC SURGERY | Facility: CLINIC | Age: 55
End: 2020-07-06
Payer: COMMERCIAL

## 2020-07-06 VITALS
OXYGEN SATURATION: 99 % | DIASTOLIC BLOOD PRESSURE: 74 MMHG | HEART RATE: 85 BPM | SYSTOLIC BLOOD PRESSURE: 129 MMHG | TEMPERATURE: 98.4 F | BODY MASS INDEX: 37.22 KG/M2 | WEIGHT: 260 LBS | HEIGHT: 70 IN

## 2020-07-06 PROCEDURE — 99024 POSTOP FOLLOW-UP VISIT: CPT

## 2020-07-09 ENCOUNTER — APPOINTMENT (OUTPATIENT)
Dept: SURGERY | Facility: CLINIC | Age: 55
End: 2020-07-09
Payer: COMMERCIAL

## 2020-07-09 VITALS
DIASTOLIC BLOOD PRESSURE: 76 MMHG | BODY MASS INDEX: 38.08 KG/M2 | WEIGHT: 266 LBS | HEIGHT: 70 IN | SYSTOLIC BLOOD PRESSURE: 118 MMHG | OXYGEN SATURATION: 99 % | HEART RATE: 95 BPM

## 2020-07-09 PROCEDURE — 99024 POSTOP FOLLOW-UP VISIT: CPT

## 2020-07-09 NOTE — PHYSICAL EXAM
[Normocephalic] : normocephalic [Atraumatic] : atraumatic [EOMI] : extra ocular movement intact [PERRL] : pupils equal, round and reactive to light [Sclera nonicteric] : sclera nonicteric [Supple] : supple [No Supraclavicular Adenopathy] : no supraclavicular adenopathy [Examined in the supine and seated position] : examined in the supine and seated position [No dominant masses] : no dominant masses in right breast  [No dominant masses] : no dominant masses left breast [No Nipple Retraction] : no left nipple retraction [No Nipple Discharge] : no left nipple discharge [No Axillary Lymphadenopathy] : no left axillary lymphadenopathy [No Edema] : no edema [No Rashes] : no rashes [No Ulceration] : no ulceration [de-identified] : Mastectomy flap healing well.  Minimal sloughing of her nipple. [de-identified] : Mastectomy flap healing well.  Minimal sloughing of her nipple.

## 2020-07-09 NOTE — REASON FOR VISIT
[Post Op: _________] : a [unfilled] post op visit [FreeTextEntry1] : Bilateral nipple sparring mastectomy with HARSHIL

## 2020-07-09 NOTE — HISTORY OF PRESENT ILLNESS
[FreeTextEntry1] : I had the pleasure of seeing ZOE CARTER  in the office today s/p bilateral nipple sparring mastectomy and HARSHIL performed on 6/22/2020.\par \par  She is a nael 56 yo nulliparous  female who has been in stable overall health.\par \par She palpated a left breast mass in the shower on labor day (2019) and underwent diagnostic mammogram/ultrasound 9/13/2019. A biopsy was performed demonstrating poorly differentiated IDC [id number] negative with DCIS measuring 1.5 cm.\par \par She completed neoadjuvant chemotherapy in February 2020 with a complete response.  She underwent left leonel  lumpectomy with slnb on 3/30/2020 and final pathology demonstrated no residual carcinoma.  She then underwent bilateral nipple sparring mastectomy with HARSHIL on 6/24/2020 ( due to covid HARSHIL were not being performed and she underwent lumpectomy due to surgical window s/p neoadjuvant chemotherapy.\par \par Imaging : \par [facility]  - Breast WAIC BI W CAD #\par MRI 9/20/19\par 1. Biopsy-proven invasive ductal carcinoma/ductal carcinoma in situ measuring up to 4.5 cm the upper central left breast, as above. Additional nonmass enhancement is noted extending from the superior aspect of the mass posteriorly by approximately 3 cm. Of note, enhancement on MRI in this location is significantly larger than reported on outside imagining reports. Wide excision is recommended. Alternatively, MR guided biopsy of additional nonmass enhancement can be performed if will alter management. The patient is advised to submit prior examinations for comparison at which time an addendum to the current be issued. \par 2. No evidence of suspicious enhancement in the contralateral right breast.\par \par  [facility]\par Bilateral diagnostic mammogram/ultrasound 9/13/2019\par Impression: New 1.5cm solid mass left breast 12:00 Imaging features are indeterminate. No suspicious finding in the right breast. BIRADS 4: Suspicious\par US: Left breast 12:00 7cm FN 1.5cm hypoechoic round mass with slightly angular margins corresponding to the new mass on mammography. Imaging features are indeterminate. BIRADS 4: Suspicious\par \par Pathology: Left breast 12:00 7cm FN\par Poorly differentiated IDC ER- TN- [id number] - with DCIS \par \par MIDSTAGING MRI BREAST:\par 1/3/202  MR breast IC BI CAD\par Impression:  Near complete resolution of enhancement/mass at the 12:00 position of the left breast.  Nonspecific 4 mm enhancing focus within the left nipple.  If it would alter management, consider surgical sampling for further evaluation.  This is not amenable to image guided biopsy.  BIRADS 4A low suspicion.\par \par 3/6/2020  MR Breast\par Impression:  Left breast malignancy status post neoadjuvant chemotherapy.  There has been resolution of previously seen enhancement in the let upper central breast.  Again noted is a stable 4 mm enhancing focus with the left nipple.  As previously discussed, this findings is not amendable to image guided biopsy.  BIRADS 6\par \par 3/30/2020\par 1.  Left axilla, sentinel lymph node #1:  One lymph node negative for metastatic carcinoma (0/1).\par 2.  Breast, left (lumpectomy):  Adenosis, small cyst, stromal fibrosis, and microcalcifications.  fibroadenoma, 0.2 cm.  Biopsy site changes.  Residual carcinoma is not identified.\par 3.  Breast, left anterior margin of lumpectomy.  Breast and fibroadipose tissue, negative for malignancy.\par 4.  Breast, left, lateral margin of lumpectomy:  Breast and fibroadipose tissue, negative for malignancy.\par 5.  Breast, left, superior margin of lumpectomy:  Breast and fibroadipose tissue, negative for malignancy.\par 6.  Breast, left, posterior/deep margin of lumpectomy:  Fibroadipose tissue, negative for malignancy.\par 7.  Breast, left medial margin of lumpectomy:  Breast and fibroadipose tissue, negative for malignancy.\par 8.  Breast left inferior margin of lumpectomy:  Fibroadipose tissue negative for malignancy.\par \par 6/24/2020 Final surgical pathology\par 1. Breast right nipple sparring simple mastectomy:  Focal fibrocystic changes and benign epithelial calcifications.\par 2.  Breast right retro-nipple, biopsy:  Benign breast tissue.\par 3.  Breast left nipple sparring simple mastectomy:  No malignancy identified.  Fibrocystic changes usual ductal hyperplasia, sclerosing adenosis, nodular adenosis and benign epithelial calcifications.  Microscopic intraductal papilloma.  Prior procedure site changes.\par 4.  Breast left retro nipple biopsy:  Benign breast tissue.\par 5.  Internal mammary lymph node, right biopsy:  unremarkable fibroadipose tissue present.\par 6.  Internal mammary lymph node, left biopsy:  One lymph node negative for metastatic carcinoma.\par \par I reviewed clinical exam and final pathology.  Her final pathology did not demonstrate any residual disease.  She is healing well and there is minimal sloughing on her nipples which she has been applying bacitracin.  She reports numbness of her breast which she was aware was going to happen.  Recommendation for follow up in 12 weeks.  She understands and agrees to the plan.

## 2020-07-09 NOTE — ASSESSMENT
[FreeTextEntry1] : 54 yo perimenopausal  s/p bilateral nipple sparring mastectomy and HARSHIL reconstruction on 6/24/2020. She is healing well and no residual carcinoma is seen.  Recommendation for follow up 12 weeks.\par 1.  Follow up in 12 weeks\par 2.  Followup with Dr. Daniels\par 3.  Followup with Dr. Dailey\par

## 2020-07-15 ENCOUNTER — APPOINTMENT (OUTPATIENT)
Dept: HEMATOLOGY ONCOLOGY | Facility: CLINIC | Age: 55
End: 2020-07-15
Payer: COMMERCIAL

## 2020-07-15 ENCOUNTER — OUTPATIENT (OUTPATIENT)
Dept: OUTPATIENT SERVICES | Facility: HOSPITAL | Age: 55
LOS: 1 days | Discharge: HOME | End: 2020-07-15

## 2020-07-15 VITALS
SYSTOLIC BLOOD PRESSURE: 118 MMHG | HEIGHT: 70 IN | BODY MASS INDEX: 36.66 KG/M2 | DIASTOLIC BLOOD PRESSURE: 75 MMHG | WEIGHT: 256.04 LBS | OXYGEN SATURATION: 98 % | HEART RATE: 96 BPM

## 2020-07-15 DIAGNOSIS — C50.919 MALIGNANT NEOPLASM OF UNSPECIFIED SITE OF UNSPECIFIED FEMALE BREAST: ICD-10-CM

## 2020-07-15 DIAGNOSIS — L73.9 FOLLICULAR DISORDER, UNSPECIFIED: Chronic | ICD-10-CM

## 2020-07-15 DIAGNOSIS — Z98.890 OTHER SPECIFIED POSTPROCEDURAL STATES: Chronic | ICD-10-CM

## 2020-07-15 DIAGNOSIS — Z45.2 ENCOUNTER FOR ADJUSTMENT AND MANAGEMENT OF VASCULAR ACCESS DEVICE: Chronic | ICD-10-CM

## 2020-07-15 PROCEDURE — 99213 OFFICE O/P EST LOW 20 MIN: CPT

## 2020-07-17 ENCOUNTER — APPOINTMENT (OUTPATIENT)
Dept: PLASTIC SURGERY | Facility: CLINIC | Age: 55
End: 2020-07-17
Payer: COMMERCIAL

## 2020-07-17 VITALS
DIASTOLIC BLOOD PRESSURE: 78 MMHG | TEMPERATURE: 98.1 F | OXYGEN SATURATION: 98 % | HEART RATE: 101 BPM | SYSTOLIC BLOOD PRESSURE: 132 MMHG

## 2020-07-17 PROCEDURE — 99024 POSTOP FOLLOW-UP VISIT: CPT

## 2020-07-17 NOTE — PHYSICAL EXAM
[Fully active, able to carry on all pre-disease performance without restriction] : Status 0 - Fully active, able to carry on all pre-disease performance without restriction [Normal] : well developed, well nourished, in no acute distress [de-identified] : Appears comfortable.

## 2020-07-17 NOTE — REVIEW OF SYSTEMS
[Fatigue] : fatigue [Negative] : Gastrointestinal [Fever] : no fever [FreeTextEntry2] : Mild fatigue

## 2020-07-17 NOTE — HISTORY OF PRESENT ILLNESS
[Home] : at home, [unfilled] , at the time of the visit. [Medical Office: ([facility])___] : at ~his/her~ medical office located in [place] [Patient] : the patient [Self] : self [de-identified] : Nikki is now S/P left lumpectomy - path report shows complete pathologic response including sentinel node.\par Recovering well.\par Bilateral mastectomy with reconstruction now planned for 0ctober.

## 2020-07-17 NOTE — ASSESSMENT
[FreeTextEntry1] : Triple negative breast cancer, invasice ductal, with complete pathologic response to AC/taxol.\par Bilateral mastectomy planned for 10/20.\par No post-op adjuvant chemotherapy or radiation indicated.\par \par 15 minutes spent discussing results of surgery and future treatment plans.

## 2020-07-17 NOTE — HISTORY OF PRESENT ILLNESS
[de-identified] : \par Nikki is S/P left lumpectomy - path report showed complete pathologic response including sentinel node.\par \par Now S/P Bilateral mastectomy with reconstruction and doing well.\par \par  [de-identified] : other than expected numbness at surgical incision sites, doing very well, with slow, steady improvement in stamina.

## 2020-07-17 NOTE — PHYSICAL EXAM
[Fully active, able to carry on all pre-disease performance without restriction] : Status 0 - Fully active, able to carry on all pre-disease performance without restriction [Normal] : well developed, well nourished, in no acute distress [de-identified] : s/P bilateral mastectomy with reconstruction

## 2020-07-17 NOTE — ASSESSMENT
[FreeTextEntry1] : \par Triple negative breast cancer, invasive ductal, with complete pathologic response to AC/taxol.\par Now S/P Bilateral mastectomy with reconstruction\par No post-op adjuvant chemotherapy or radiation indicated.\par

## 2020-08-06 ENCOUNTER — APPOINTMENT (OUTPATIENT)
Dept: PLASTIC SURGERY | Facility: CLINIC | Age: 55
End: 2020-08-06
Payer: COMMERCIAL

## 2020-08-06 VITALS
TEMPERATURE: 98 F | WEIGHT: 250 LBS | HEART RATE: 101 BPM | SYSTOLIC BLOOD PRESSURE: 136 MMHG | DIASTOLIC BLOOD PRESSURE: 75 MMHG | BODY MASS INDEX: 35.79 KG/M2 | OXYGEN SATURATION: 96 % | HEIGHT: 70 IN

## 2020-08-06 PROCEDURE — 99024 POSTOP FOLLOW-UP VISIT: CPT

## 2020-08-06 RX ORDER — KETOROLAC TROMETHAMINE 10 MG/1
10 TABLET, FILM COATED ORAL EVERY 6 HOURS
Qty: 20 | Refills: 0 | Status: DISCONTINUED | COMMUNITY
Start: 2020-06-23 | End: 2020-08-06

## 2020-08-06 RX ORDER — OXYCODONE AND ACETAMINOPHEN 5; 325 MG/1; MG/1
5-325 TABLET ORAL
Qty: 30 | Refills: 0 | Status: DISCONTINUED | COMMUNITY
Start: 2020-06-23 | End: 2020-08-06

## 2020-08-06 RX ORDER — ONDANSETRON 4 MG/1
4 TABLET, ORALLY DISINTEGRATING ORAL EVERY 6 HOURS
Qty: 30 | Refills: 0 | Status: DISCONTINUED | COMMUNITY
Start: 2020-06-23 | End: 2020-08-06

## 2020-08-06 RX ORDER — DOCUSATE SODIUM 100 MG/1
100 CAPSULE ORAL TWICE DAILY
Qty: 20 | Refills: 0 | Status: DISCONTINUED | COMMUNITY
Start: 2020-06-23 | End: 2020-08-06

## 2020-08-20 NOTE — HISTORY OF PRESENT ILLNESS
[FreeTextEntry1] : 55 y.o. F history of triple negative left breast CA who is most recently s/p b/l nipple sparing mastectomies with HARSHIL flap reconstruction on 6/24/2020. Pt reports superficial right breast wound with minimal drainage, she denies fever, chills, redness, swelling. Pt states she is overall happy with her reconstruction.

## 2020-08-20 NOTE — PHYSICAL EXAM
[de-identified] : b/l breast flaps soft, nt. well healed. right breast inferior incision with a small 0.5cm superficial wound, healing and with healthy granulation tissue. good volume. right NAC lower than left. Both with some areolar deficiency medially. Left nipple with slighlty less projection due to delayed healing. [de-identified] : abdomen soft, nt. incisions well healed. umbilicus well healed. no bulge. +lateral scar abnormalities with slight fullness to right abdomen. Bilateral contour irregularities due to transion from upper to lower abdomen.

## 2020-09-08 NOTE — PHYSICAL EXAM
[de-identified] : FROM b/l UE  [de-identified] : abdomen soft, nt. incisions well healed. JENIFFER drain held to suction with minimal SS output. JENIFFER drain dc'ed and dressing placed with bacitracin + gauze dressing. [de-identified] : b/l breast flaps well healed. nipple globally viable, areas of epidermolysis well healed. good volume and symmetry.

## 2020-09-08 NOTE — HISTORY OF PRESENT ILLNESS
[FreeTextEntry1] : 55 y.o. F history of triple negative left breast CA who is most recently s/p b/l nipple sparing mastectomies with HARSHIL flap reconstruction on 6/24/2020. Pt reports doing well and is without any complaints today. JENIFFER drain output reviewed; less than 30cc's in 24 hours. She denies fever, chills, redness, swelling, and pain.

## 2020-09-08 NOTE — HISTORY OF PRESENT ILLNESS
[FreeTextEntry1] : 55 y.o. F history of triple negative left breast CA who is most recently s/p b/l nipple sparing mastectomies with HARSHIL flap reconstruction on 6/24/2020. Pt reports doing well and is without any complaints today. JENIFFER drain outputs reviewed.

## 2020-09-08 NOTE — PHYSICAL EXAM
[de-identified] : b/l breast flaps soft, nt, viable. incisions c/d/i and healing well.  [de-identified] : FROM b/l UE  [de-identified] : abdomen soft, nt. incisions c/d/i and healing well. no palpable collection or infection. left JENIFFER drain held to suction with minimal SS output. JENIFFER drain dc'ed and dressing placed with bacitracin + gauze dressing. right JENIFFER drain with SS output and kept in place.

## 2020-09-18 ENCOUNTER — APPOINTMENT (OUTPATIENT)
Dept: PLASTIC SURGERY | Facility: CLINIC | Age: 55
End: 2020-09-18
Payer: COMMERCIAL

## 2020-09-18 VITALS
SYSTOLIC BLOOD PRESSURE: 119 MMHG | TEMPERATURE: 98.2 F | HEIGHT: 70 IN | BODY MASS INDEX: 35.79 KG/M2 | OXYGEN SATURATION: 99 % | DIASTOLIC BLOOD PRESSURE: 76 MMHG | HEART RATE: 103 BPM | WEIGHT: 250 LBS

## 2020-09-18 PROCEDURE — 99214 OFFICE O/P EST MOD 30 MIN: CPT | Mod: 24

## 2020-09-18 NOTE — PHYSICAL EXAM
[de-identified] : b/l breasts soft, nt. incisions well healed. good symmetry. pt will benefit from breast skin paddle excision. left nipple with less projection than right [de-identified] : abdomen soft, nt. incisions well healed. +lateral scar abnormalities. Nodular irregularities along scar.

## 2020-09-18 NOTE — REASON FOR VISIT
[Follow-Up: _____] : a [unfilled] follow-up visit [Spouse] : spouse [FreeTextEntry1] : s/p b/l nipple sparing mastectomies with HARSHIL flap reconstruction DOS 6/24/2020.

## 2020-09-18 NOTE — HISTORY OF PRESENT ILLNESS
[FreeTextEntry1] : 55 y.o. F history of triple negative left breast CA who is most recently s/p b/l nipple sparing mastectomies with HARSHIL flap reconstruction on 6/24/2020. She presents today seeking revision of her breast reconstruction and abdominal donor site. She reports doing well otherwise.

## 2020-09-21 ENCOUNTER — APPOINTMENT (OUTPATIENT)
Dept: SURGERY | Facility: CLINIC | Age: 55
End: 2020-09-21

## 2020-10-06 ENCOUNTER — APPOINTMENT (OUTPATIENT)
Dept: SURGERY | Facility: CLINIC | Age: 55
End: 2020-10-06
Payer: COMMERCIAL

## 2020-10-06 VITALS
BODY MASS INDEX: 37.94 KG/M2 | WEIGHT: 265 LBS | SYSTOLIC BLOOD PRESSURE: 124 MMHG | HEIGHT: 70 IN | HEART RATE: 100 BPM | DIASTOLIC BLOOD PRESSURE: 78 MMHG | OXYGEN SATURATION: 99 % | TEMPERATURE: 97.9 F

## 2020-10-06 PROCEDURE — 99214 OFFICE O/P EST MOD 30 MIN: CPT

## 2020-10-06 NOTE — ASSESSMENT
[FreeTextEntry1] : 56 yo perimenopausal  s/p bilateral nipple sparring mastectomy and HARSHIL reconstruction on 6/24/2020. She is well healed and no residual carcinoma is seen on final pathology. She states she is undergoing revision surgery with Dr. Daniels on 10/28/2020.  Recommendation for follow up 12 weeks.\par 1.  Follow up in 12 weeks\par 2.  Followup with Dr. Daniels\par 3.  Followup with Dr. Dailey\par

## 2020-10-06 NOTE — PHYSICAL EXAM
[Normocephalic] : normocephalic [Atraumatic] : atraumatic [EOMI] : extra ocular movement intact [PERRL] : pupils equal, round and reactive to light [Sclera nonicteric] : sclera nonicteric [Supple] : supple [No Supraclavicular Adenopathy] : no supraclavicular adenopathy [Examined in the supine and seated position] : examined in the supine and seated position [No dominant masses] : no dominant masses in right breast  [No dominant masses] : no dominant masses left breast [No Nipple Retraction] : no left nipple retraction [No Nipple Discharge] : no left nipple discharge [No Axillary Lymphadenopathy] : no left axillary lymphadenopathy [No Edema] : no edema [No Rashes] : no rashes [No Ulceration] : no ulceration [de-identified] : Mastectomy flap healing well.  [de-identified] : Mastectomy flap healing well.

## 2020-10-06 NOTE — HISTORY OF PRESENT ILLNESS
[FreeTextEntry1] : I had the pleasure of seeing ZOE CARTER  in the office today for a clinical exam secaodnry to diagnosed and treated left breast cancer.\par \par  She is a nael 56 yo nulliparous  female who has been in stable overall health.\par \par She palpated a left breast mass in the shower on labor day (2019) and underwent diagnostic mammogram/ultrasound 9/13/2019. A biopsy was performed demonstrating poorly differentiated IDC [id number] negative with DCIS measuring 1.5 cm.\par \par She completed neoadjuvant chemotherapy in February 2020 with a complete response.  She underwent left leonel  lumpectomy with slnb on 3/30/2020 and final pathology demonstrated no residual carcinoma.  She then underwent bilateral nipple sparring mastectomy with HARSHIL on 6/24/2020 ( due to covid HARSHIL were not being performed and she underwent lumpectomy due to surgical window s/p neoadjuvant chemotherapy.\par \par Imaging : \par [facility]  - Breast WAIC BI W CAD #\par MRI 9/20/19\par 1. Biopsy-proven invasive ductal carcinoma/ductal carcinoma in situ measuring up to 4.5 cm the upper central left breast, as above. Additional nonmass enhancement is noted extending from the superior aspect of the mass posteriorly by approximately 3 cm. Of note, enhancement on MRI in this location is significantly larger than reported on outside imagining reports. Wide excision is recommended. Alternatively, MR guided biopsy of additional nonmass enhancement can be performed if will alter management. The patient is advised to submit prior examinations for comparison at which time an addendum to the current be issued. \par 2. No evidence of suspicious enhancement in the contralateral right breast.\par \par  [facility]\par Bilateral diagnostic mammogram/ultrasound 9/13/2019\par Impression: New 1.5cm solid mass left breast 12:00 Imaging features are indeterminate. No suspicious finding in the right breast. BIRADS 4: Suspicious\par US: Left breast 12:00 7cm FN 1.5cm hypoechoic round mass with slightly angular margins corresponding to the new mass on mammography. Imaging features are indeterminate. BIRADS 4: Suspicious\par \par Pathology: Left breast 12:00 7cm FN\par Poorly differentiated IDC ER- NM- [id number] - with DCIS \par \par MIDSTAGING MRI BREAST:\par 1/3/202  MR breast IC BI CAD\par Impression:  Near complete resolution of enhancement/mass at the 12:00 position of the left breast.  Nonspecific 4 mm enhancing focus within the left nipple.  If it would alter management, consider surgical sampling for further evaluation.  This is not amenable to image guided biopsy.  BIRADS 4A low suspicion.\par \par 3/6/2020  MR Breast\par Impression:  Left breast malignancy status post neoadjuvant chemotherapy.  There has been resolution of previously seen enhancement in the let upper central breast.  Again noted is a stable 4 mm enhancing focus with the left nipple.  As previously discussed, this findings is not amendable to image guided biopsy.  BIRADS 6\par \par 3/30/2020\par 1.  Left axilla, sentinel lymph node #1:  One lymph node negative for metastatic carcinoma (0/1).\par 2.  Breast, left (lumpectomy):  Adenosis, small cyst, stromal fibrosis, and microcalcifications.  fibroadenoma, 0.2 cm.  Biopsy site changes.  Residual carcinoma is not identified.\par 3.  Breast, left anterior margin of lumpectomy.  Breast and fibroadipose tissue, negative for malignancy.\par 4.  Breast, left, lateral margin of lumpectomy:  Breast and fibroadipose tissue, negative for malignancy.\par 5.  Breast, left, superior margin of lumpectomy:  Breast and fibroadipose tissue, negative for malignancy.\par 6.  Breast, left, posterior/deep margin of lumpectomy:  Fibroadipose tissue, negative for malignancy.\par 7.  Breast, left medial margin of lumpectomy:  Breast and fibroadipose tissue, negative for malignancy.\par 8.  Breast left inferior margin of lumpectomy:  Fibroadipose tissue negative for malignancy.\par \par 6/24/2020 Final surgical pathology\par 1. Breast right nipple sparring simple mastectomy:  Focal fibrocystic changes and benign epithelial calcifications.\par 2.  Breast right retro-nipple, biopsy:  Benign breast tissue.\par 3.  Breast left nipple sparring simple mastectomy:  No malignancy identified.  Fibrocystic changes usual ductal hyperplasia, sclerosing adenosis, nodular adenosis and benign epithelial calcifications.  Microscopic intraductal papilloma.  Prior procedure site changes.\par 4.  Breast left retro nipple biopsy:  Benign breast tissue.\par 5.  Internal mammary lymph node, right biopsy:  unremarkable fibroadipose tissue present.\par 6.  Internal mammary lymph node, left biopsy:  One lymph node negative for metastatic carcinoma.\par \par I reviewed clinical exam with her and she understands it is benign today.  She discussed she will be undergoing revision surgery with Dr. Daniels on October 28th.  Recommendation for follow up in 4 months for clinical exam.  She understands and agrees to plan.  All questions answered.

## 2020-10-07 ENCOUNTER — APPOINTMENT (OUTPATIENT)
Dept: OBGYN | Facility: CLINIC | Age: 55
End: 2020-10-07
Payer: COMMERCIAL

## 2020-10-07 VITALS
DIASTOLIC BLOOD PRESSURE: 88 MMHG | WEIGHT: 262 LBS | SYSTOLIC BLOOD PRESSURE: 120 MMHG | BODY MASS INDEX: 37.51 KG/M2 | HEIGHT: 70 IN

## 2020-10-07 DIAGNOSIS — Z87.898 PERSONAL HISTORY OF OTHER SPECIFIED CONDITIONS: ICD-10-CM

## 2020-10-07 DIAGNOSIS — Z01.419 ENCOUNTER FOR GYNECOLOGICAL EXAMINATION (GENERAL) (ROUTINE) W/OUT ABNORMAL FINDINGS: ICD-10-CM

## 2020-10-07 DIAGNOSIS — F41.9 ANXIETY DISORDER, UNSPECIFIED: ICD-10-CM

## 2020-10-07 DIAGNOSIS — Z78.0 ASYMPTOMATIC MENOPAUSAL STATE: ICD-10-CM

## 2020-10-07 DIAGNOSIS — Z85.3 PERSONAL HISTORY OF MALIGNANT NEOPLASM OF BREAST: ICD-10-CM

## 2020-10-07 PROCEDURE — 99396 PREV VISIT EST AGE 40-64: CPT

## 2020-10-07 RX ORDER — LORAZEPAM 0.5 MG/1
0.5 TABLET ORAL 3 TIMES DAILY
Qty: 90 | Refills: 0 | Status: DISCONTINUED | COMMUNITY
Start: 2020-01-16 | End: 2020-10-07

## 2020-10-07 NOTE — PHYSICAL EXAM
[Appropriately responsive] : appropriately responsive [Alert] : alert [No Acute Distress] : no acute distress [No Murmurs] : no murmurs [Soft] : soft [Non-tender] : non-tender [Non-distended] : non-distended [No HSM] : No HSM [No Lesions] : no lesions [No Mass] : no mass [Oriented x3] : oriented x3 [Right Breast Absent] : a total mastectomy [Breast Reconstruction Right] : breast reconstruction [Left Breast Absent] : a total mastectomy [Breast Reconstruction Left] : breast reconstruction [Labia Majora] : normal [Labia Minora] : normal [Normal] : normal [Uterine Adnexae] : normal [No Tenderness] : no tenderness [Nl Sphincter Tone] : normal sphincter tone

## 2020-10-07 NOTE — HISTORY OF PRESENT ILLNESS
[N] : Patient denies prior pregnancies [Menarche Age: ____] : age at menarche was [unfilled] [Menopause Age: ____] : age at menopause was [unfilled] [Y] : Patient is sexually active [PGHxTotal] : 0 [PGHxFullTerm] : 0 [PGHxPremature] : 0 [PGHxAbortions] : 0 [[facility]] : 0 [PGHxABInduced] : 0 [PGHxABSpont] : 0 [PGHxEctopic] : 0 [PGHxMultBirths] : 0

## 2020-10-08 LAB — HPV HIGH+LOW RISK DNA PNL CVX: NOT DETECTED

## 2020-10-13 LAB — CYTOLOGY CVX/VAG DOC THIN PREP: NORMAL

## 2020-10-19 ENCOUNTER — APPOINTMENT (OUTPATIENT)
Dept: RADIOLOGY | Facility: CLINIC | Age: 55
End: 2020-10-19
Payer: COMMERCIAL

## 2020-10-19 ENCOUNTER — APPOINTMENT (OUTPATIENT)
Dept: ULTRASOUND IMAGING | Facility: CLINIC | Age: 55
End: 2020-10-19
Payer: COMMERCIAL

## 2020-10-19 ENCOUNTER — OUTPATIENT (OUTPATIENT)
Dept: OUTPATIENT SERVICES | Facility: HOSPITAL | Age: 55
LOS: 1 days | End: 2020-10-19
Payer: COMMERCIAL

## 2020-10-19 DIAGNOSIS — L73.9 FOLLICULAR DISORDER, UNSPECIFIED: Chronic | ICD-10-CM

## 2020-10-19 DIAGNOSIS — Z98.890 OTHER SPECIFIED POSTPROCEDURAL STATES: Chronic | ICD-10-CM

## 2020-10-19 DIAGNOSIS — D25.9 LEIOMYOMA OF UTERUS, UNSPECIFIED: ICD-10-CM

## 2020-10-19 DIAGNOSIS — Z45.2 ENCOUNTER FOR ADJUSTMENT AND MANAGEMENT OF VASCULAR ACCESS DEVICE: Chronic | ICD-10-CM

## 2020-10-19 DIAGNOSIS — Z78.0 ASYMPTOMATIC MENOPAUSAL STATE: ICD-10-CM

## 2020-10-19 PROCEDURE — 76856 US EXAM PELVIC COMPLETE: CPT

## 2020-10-19 PROCEDURE — 76856 US EXAM PELVIC COMPLETE: CPT | Mod: 26,59

## 2020-10-19 PROCEDURE — 76830 TRANSVAGINAL US NON-OB: CPT

## 2020-10-19 PROCEDURE — 77080 DXA BONE DENSITY AXIAL: CPT | Mod: 26

## 2020-10-19 PROCEDURE — 76830 TRANSVAGINAL US NON-OB: CPT | Mod: 26

## 2020-10-19 PROCEDURE — 77080 DXA BONE DENSITY AXIAL: CPT

## 2020-10-21 ENCOUNTER — APPOINTMENT (OUTPATIENT)
Dept: INTERNAL MEDICINE | Facility: CLINIC | Age: 55
End: 2020-10-21
Payer: COMMERCIAL

## 2020-10-21 ENCOUNTER — NON-APPOINTMENT (OUTPATIENT)
Age: 55
End: 2020-10-21

## 2020-10-21 VITALS — WEIGHT: 260 LBS | BODY MASS INDEX: 37.22 KG/M2 | HEIGHT: 70 IN

## 2020-10-21 VITALS — DIASTOLIC BLOOD PRESSURE: 82 MMHG | HEART RATE: 78 BPM | RESPIRATION RATE: 12 BRPM | SYSTOLIC BLOOD PRESSURE: 122 MMHG

## 2020-10-21 DIAGNOSIS — N64.89 OTHER SPECIFIED DISORDERS OF BREAST: ICD-10-CM

## 2020-10-21 DIAGNOSIS — N65.1 DISPROPORTION OF RECONSTRUCTED BREAST: ICD-10-CM

## 2020-10-21 PROCEDURE — 36415 COLL VENOUS BLD VENIPUNCTURE: CPT

## 2020-10-21 PROCEDURE — 99214 OFFICE O/P EST MOD 30 MIN: CPT | Mod: 25

## 2020-10-21 PROCEDURE — 99072 ADDL SUPL MATRL&STAF TM PHE: CPT

## 2020-10-21 PROCEDURE — 93000 ELECTROCARDIOGRAM COMPLETE: CPT

## 2020-10-22 LAB
ANION GAP SERPL CALC-SCNC: 13 MMOL/L
APTT BLD: 32.6 SEC
BASOPHILS # BLD AUTO: 0.03 K/UL
BASOPHILS NFR BLD AUTO: 0.6 %
BUN SERPL-MCNC: 13 MG/DL
CALCIUM SERPL-MCNC: 10 MG/DL
CHLORIDE SERPL-SCNC: 104 MMOL/L
CO2 SERPL-SCNC: 24 MMOL/L
CREAT SERPL-MCNC: 0.79 MG/DL
EOSINOPHIL # BLD AUTO: 0.12 K/UL
EOSINOPHIL NFR BLD AUTO: 2.2 %
GLUCOSE SERPL-MCNC: 85 MG/DL
HCT VFR BLD CALC: 42.6 %
HGB BLD-MCNC: 13.1 G/DL
IMM GRANULOCYTES NFR BLD AUTO: 0.2 %
INR PPP: 0.99 RATIO
LYMPHOCYTES # BLD AUTO: 1.92 K/UL
LYMPHOCYTES NFR BLD AUTO: 35.5 %
MAN DIFF?: NORMAL
MCHC RBC-ENTMCNC: 26.1 PG
MCHC RBC-ENTMCNC: 30.8 GM/DL
MCV RBC AUTO: 85 FL
MONOCYTES # BLD AUTO: 0.6 K/UL
MONOCYTES NFR BLD AUTO: 11.1 %
NEUTROPHILS # BLD AUTO: 2.73 K/UL
NEUTROPHILS NFR BLD AUTO: 50.4 %
PLATELET # BLD AUTO: 306 K/UL
POTASSIUM SERPL-SCNC: 4.6 MMOL/L
PT BLD: 11.8 SEC
RBC # BLD: 5.01 M/UL
RBC # FLD: 17.2 %
SODIUM SERPL-SCNC: 141 MMOL/L
WBC # FLD AUTO: 5.41 K/UL

## 2020-10-22 NOTE — HISTORY OF PRESENT ILLNESS
[No Pertinent Cardiac History] : no history of aortic stenosis, atrial fibrillation, coronary artery disease, recent myocardial infarction, or implantable device/pacemaker [No Pertinent Pulmonary History] : no history of asthma, COPD, sleep apnea, or smoking [No Adverse Anesthesia Reaction] : no adverse anesthesia reaction in self or family member [(Patient denies any chest pain, claudication, dyspnea on exertion, orthopnea, palpitations or syncope)] : Patient denies any chest pain, claudication, dyspnea on exertion, orthopnea, palpitations or syncope [Good (7-10 METs)] : Good (7-10 METs) [Aortic Stenosis] : no aortic stenosis [Atrial Fibrillation] : no atrial fibrillation [Coronary Artery Disease] : no coronary artery disease [Recent Myocardial Infarction] : no recent myocardial infarction [Implantable Device/Pacemaker] : no implantable device/pacemaker [Asthma] : no asthma [COPD] : no COPD [Sleep Apnea] : no sleep apnea [Smoker] : not a smoker [Family Member] : no family member with adverse anesthesia reaction/sudden death [Self] : no previous adverse anesthesia reaction [Chronic Anticoagulation] : no chronic anticoagulation [Chronic Kidney Disease] : no chronic kidney disease [Diabetes] : no diabetes [FreeTextEntry1] : Revision Breast Reconstruction [FreeTextEntry2] : 10/21/20 [FreeTextEntry3] : Dr. Yash Daniels [FreeTextEntry4] : Patient with history of breast cancer, PCOS who is on metformin.  Patient will have revision of breast reconstruction.

## 2020-10-22 NOTE — RESULTS/DATA
[] : results reviewed [de-identified] : normal [de-identified] : normal [de-identified] : normal [de-identified] : nsr no acute changes

## 2020-10-22 NOTE — ASSESSMENT
[Patient Optimized for Surgery] : Patient optimized for surgery [No Further Testing Recommended] : no further testing recommended [FreeTextEntry4] : Patient is in good health. She has no contraindications to planned procedure [FreeTextEntry7] : hold metformin 24 hours, no asa no motin

## 2020-10-24 ENCOUNTER — APPOINTMENT (OUTPATIENT)
Dept: DISASTER EMERGENCY | Facility: CLINIC | Age: 55
End: 2020-10-24

## 2020-10-25 LAB — SARS-COV-2 N GENE NPH QL NAA+PROBE: NOT DETECTED

## 2020-10-27 ENCOUNTER — RESULT REVIEW (OUTPATIENT)
Age: 55
End: 2020-10-27

## 2020-10-27 ENCOUNTER — APPOINTMENT (OUTPATIENT)
Dept: PLASTIC SURGERY | Facility: AMBULATORY SURGERY CENTER | Age: 55
End: 2020-10-27
Payer: COMMERCIAL

## 2020-10-27 PROCEDURE — 14000 TIS TRNFR TRUNK 10 SQ CM/<: CPT | Mod: 59

## 2020-10-27 PROCEDURE — 36590 REMOVAL TUNNELED CV CATH: CPT

## 2020-10-27 PROCEDURE — 14001 TIS TRNFR TRUNK 10.1-30SQCM: CPT

## 2020-10-27 PROCEDURE — 19380 REVJ RECONSTRUCTED BREAST: CPT | Mod: 50

## 2020-10-29 ENCOUNTER — APPOINTMENT (OUTPATIENT)
Dept: INTERNAL MEDICINE | Facility: CLINIC | Age: 55
End: 2020-10-29
Payer: COMMERCIAL

## 2020-10-29 DIAGNOSIS — Z23 ENCOUNTER FOR IMMUNIZATION: ICD-10-CM

## 2020-10-29 PROCEDURE — 99072 ADDL SUPL MATRL&STAF TM PHE: CPT

## 2020-10-29 PROCEDURE — 90686 IIV4 VACC NO PRSV 0.5 ML IM: CPT

## 2020-10-29 PROCEDURE — G0008: CPT

## 2020-11-09 ENCOUNTER — APPOINTMENT (OUTPATIENT)
Dept: PLASTIC SURGERY | Facility: CLINIC | Age: 55
End: 2020-11-09
Payer: COMMERCIAL

## 2020-11-09 VITALS
HEIGHT: 70 IN | HEART RATE: 104 BPM | OXYGEN SATURATION: 96 % | SYSTOLIC BLOOD PRESSURE: 146 MMHG | WEIGHT: 260 LBS | TEMPERATURE: 97.6 F | BODY MASS INDEX: 37.22 KG/M2 | DIASTOLIC BLOOD PRESSURE: 83 MMHG

## 2020-11-09 PROCEDURE — 99024 POSTOP FOLLOW-UP VISIT: CPT

## 2020-11-11 NOTE — HISTORY OF PRESENT ILLNESS
[FreeTextEntry1] : 55 y.o. F history of triple negative left breast CA who is most recently s/p b/l nipple sparing mastectomies with HARSHIL flap reconstruction on 6/24/2020 and most recently she is s/p revision of b/l breast reconstruction and abdominal donor site scar on 10/27/20. Pt reports doing well and states she is happy with her result. \par \par Pathology reviewed.

## 2020-11-11 NOTE — PHYSICAL EXAM
[de-identified] : b/l breasts soft, nt. nipple healing well.  [de-identified] : abdomen soft, nt. incisions well healed. no erythema/warmth/dc.

## 2020-12-23 PROBLEM — Z01.419 ENCOUNTER FOR GYNECOLOGICAL EXAMINATION: Status: RESOLVED | Noted: 2020-10-07 | Resolved: 2020-12-23

## 2021-01-19 ENCOUNTER — APPOINTMENT (OUTPATIENT)
Dept: SURGERY | Facility: CLINIC | Age: 56
End: 2021-01-19
Payer: COMMERCIAL

## 2021-01-19 VITALS
HEART RATE: 93 BPM | TEMPERATURE: 98.1 F | DIASTOLIC BLOOD PRESSURE: 82 MMHG | SYSTOLIC BLOOD PRESSURE: 132 MMHG | WEIGHT: 260 LBS | OXYGEN SATURATION: 97 % | HEIGHT: 70 IN | BODY MASS INDEX: 37.22 KG/M2

## 2021-01-19 DIAGNOSIS — Z98.890 OTHER SPECIFIED POSTPROCEDURAL STATES: ICD-10-CM

## 2021-01-19 PROCEDURE — 99072 ADDL SUPL MATRL&STAF TM PHE: CPT

## 2021-01-19 PROCEDURE — 99214 OFFICE O/P EST MOD 30 MIN: CPT

## 2021-01-25 PROBLEM — Z98.890 S/P LUMPECTOMY OF BREAST: Status: ACTIVE | Noted: 2020-03-30

## 2021-01-25 NOTE — HISTORY OF PRESENT ILLNESS
[FreeTextEntry1] : I had the pleasure of seeing ZOE CARTER  in the office today for a clinical exam secondary to diagnosed and treated left breast cancer.\par \par  She is a nael 56 yo nulliparous  female who has been in stable overall health.\par \par She palpated a left breast mass in the shower on labor day (2019) and underwent diagnostic mammogram/ultrasound 9/13/2019. A biopsy was performed demonstrating poorly differentiated IDC [id number] negative with DCIS measuring 1.5 cm.\par \par She completed neoadjuvant chemotherapy in February 2020 with a complete response.  She underwent left leonel  lumpectomy with slnb on 3/30/2020 and final pathology demonstrated no residual carcinoma.  She then underwent bilateral nipple sparring mastectomy with HARSHIL on 6/24/2020 ( due to covid HARSHIL were not being performed and she underwent lumpectomy due to surgical window s/p neoadjuvant chemotherapy.\par \par Imaging : \par [facility]  - Breast WAIC BI W CAD #\par MRI 9/20/19\par 1. Biopsy-proven invasive ductal carcinoma/ductal carcinoma in situ measuring up to 4.5 cm the upper central left breast, as above. Additional nonmass enhancement is noted extending from the superior aspect of the mass posteriorly by approximately 3 cm. Of note, enhancement on MRI in this location is significantly larger than reported on outside imagining reports. Wide excision is recommended. Alternatively, MR guided biopsy of additional nonmass enhancement can be performed if will alter management. The patient is advised to submit prior examinations for comparison at which time an addendum to the current be issued. \par 2. No evidence of suspicious enhancement in the contralateral right breast.\par \par  [facility]\par Bilateral diagnostic mammogram/ultrasound 9/13/2019\par Impression: New 1.5cm solid mass left breast 12:00 Imaging features are indeterminate. No suspicious finding in the right breast. BIRADS 4: Suspicious\par US: Left breast 12:00 7cm FN 1.5cm hypoechoic round mass with slightly angular margins corresponding to the new mass on mammography. Imaging features are indeterminate. BIRADS 4: Suspicious\par \par Pathology: Left breast 12:00 7cm FN\par Poorly differentiated IDC ER- NJ- [id number] - with DCIS \par \par MIDSTAGING MRI BREAST:\par 1/3/202  MR breast IC BI CAD\par Impression:  Near complete resolution of enhancement/mass at the 12:00 position of the left breast.  Nonspecific 4 mm enhancing focus within the left nipple.  If it would alter management, consider surgical sampling for further evaluation.  This is not amenable to image guided biopsy.  BIRADS 4A low suspicion.\par \par 3/6/2020  MR Breast\par Impression:  Left breast malignancy status post neoadjuvant chemotherapy.  There has been resolution of previously seen enhancement in the let upper central breast.  Again noted is a stable 4 mm enhancing focus with the left nipple.  As previously discussed, this findings is not amendable to image guided biopsy.  BIRADS 6\par \par 3/30/2020\par 1.  Left axilla, sentinel lymph node #1:  One lymph node negative for metastatic carcinoma (0/1).\par 2.  Breast, left (lumpectomy):  Adenosis, small cyst, stromal fibrosis, and microcalcifications.  fibroadenoma, 0.2 cm.  Biopsy site changes.  Residual carcinoma is not identified.\par 3.  Breast, left anterior margin of lumpectomy.  Breast and fibroadipose tissue, negative for malignancy.\par 4.  Breast, left, lateral margin of lumpectomy:  Breast and fibroadipose tissue, negative for malignancy.\par 5.  Breast, left, superior margin of lumpectomy:  Breast and fibroadipose tissue, negative for malignancy.\par 6.  Breast, left, posterior/deep margin of lumpectomy:  Fibroadipose tissue, negative for malignancy.\par 7.  Breast, left medial margin of lumpectomy:  Breast and fibroadipose tissue, negative for malignancy.\par 8.  Breast left inferior margin of lumpectomy:  Fibroadipose tissue negative for malignancy.\par \par 6/24/2020 Final surgical pathology\par 1. Breast right nipple sparring simple mastectomy:  Focal fibrocystic changes and benign epithelial calcifications.\par 2.  Breast right retro-nipple, biopsy:  Benign breast tissue.\par 3.  Breast left nipple sparring simple mastectomy:  No malignancy identified.  Fibrocystic changes usual ductal hyperplasia, sclerosing adenosis, nodular adenosis and benign epithelial calcifications.  Microscopic intraductal papilloma.  Prior procedure site changes.\par 4.  Breast left retro nipple biopsy:  Benign breast tissue.\par 5.  Internal mammary lymph node, right biopsy:  unremarkable fibroadipose tissue present.\par 6.  Internal mammary lymph node, left biopsy:  One lymph node negative for metastatic carcinoma.\par \par I reviewed clinical exam with her and she understands there are no dominant masses on exam today.  She states in the right breast she feels area of hardness and this is highly suspicious for fat necrosis.   Recommendation for right breast US due to areas of breast hardness.  Will review results via mobile.  She is to return the office in 6 months if imaging is benign.  \par \par She understands and agrees with plan.  All questions answered.\par

## 2021-01-25 NOTE — ASSESSMENT
[FreeTextEntry1] : 56 yo perimenopausal  s/p bilateral nipple sparring mastectomy and HARSHIL reconstruction on 6/24/2020. She is well healed and no residual carcinoma is seen on final pathology. She states right breast hardness which is highly suspicious for fat necrosis.  Recommendation for:\par 1.  Follow up in 6 months\par 2.  Followup with Dr. Daniels\par 3.  Followup with Dr. Dailey\par 4.  Right breast US\par 5.  PT- for fat necrosis s/p breast reconstruction\par

## 2021-01-25 NOTE — PHYSICAL EXAM
[Normocephalic] : normocephalic [Atraumatic] : atraumatic [EOMI] : extra ocular movement intact [PERRL] : pupils equal, round and reactive to light [Sclera nonicteric] : sclera nonicteric [Supple] : supple [No Supraclavicular Adenopathy] : no supraclavicular adenopathy [Examined in the supine and seated position] : examined in the supine and seated position [No dominant masses] : no dominant masses in right breast  [No dominant masses] : no dominant masses left breast [No Nipple Retraction] : no left nipple retraction [No Nipple Discharge] : no left nipple discharge [No Axillary Lymphadenopathy] : no left axillary lymphadenopathy [No Edema] : no edema [No Rashes] : no rashes [No Ulceration] : no ulceration [de-identified] : Mastectomy flap healing well.  [de-identified] : Mastectomy flap healing well.

## 2021-01-29 ENCOUNTER — RESULT REVIEW (OUTPATIENT)
Age: 56
End: 2021-01-29

## 2021-01-29 ENCOUNTER — APPOINTMENT (OUTPATIENT)
Dept: ULTRASOUND IMAGING | Facility: CLINIC | Age: 56
End: 2021-01-29
Payer: COMMERCIAL

## 2021-01-29 ENCOUNTER — OUTPATIENT (OUTPATIENT)
Dept: OUTPATIENT SERVICES | Facility: HOSPITAL | Age: 56
LOS: 1 days | End: 2021-01-29
Payer: COMMERCIAL

## 2021-01-29 DIAGNOSIS — Z45.2 ENCOUNTER FOR ADJUSTMENT AND MANAGEMENT OF VASCULAR ACCESS DEVICE: Chronic | ICD-10-CM

## 2021-01-29 DIAGNOSIS — Z00.8 ENCOUNTER FOR OTHER GENERAL EXAMINATION: ICD-10-CM

## 2021-01-29 DIAGNOSIS — Z98.890 OTHER SPECIFIED POSTPROCEDURAL STATES: Chronic | ICD-10-CM

## 2021-01-29 DIAGNOSIS — L73.9 FOLLICULAR DISORDER, UNSPECIFIED: Chronic | ICD-10-CM

## 2021-01-29 PROCEDURE — 76641 ULTRASOUND BREAST COMPLETE: CPT

## 2021-01-29 PROCEDURE — 76641 ULTRASOUND BREAST COMPLETE: CPT | Mod: 26,RT

## 2021-02-01 ENCOUNTER — OUTPATIENT (OUTPATIENT)
Dept: OUTPATIENT SERVICES | Facility: HOSPITAL | Age: 56
LOS: 1 days | Discharge: HOME | End: 2021-02-01

## 2021-02-01 DIAGNOSIS — Z98.890 OTHER SPECIFIED POSTPROCEDURAL STATES: Chronic | ICD-10-CM

## 2021-02-01 DIAGNOSIS — L73.9 FOLLICULAR DISORDER, UNSPECIFIED: Chronic | ICD-10-CM

## 2021-02-01 DIAGNOSIS — C50.919 MALIGNANT NEOPLASM OF UNSPECIFIED SITE OF UNSPECIFIED FEMALE BREAST: ICD-10-CM

## 2021-02-01 DIAGNOSIS — Z45.2 ENCOUNTER FOR ADJUSTMENT AND MANAGEMENT OF VASCULAR ACCESS DEVICE: Chronic | ICD-10-CM

## 2021-02-04 ENCOUNTER — APPOINTMENT (OUTPATIENT)
Dept: HEMATOLOGY ONCOLOGY | Facility: CLINIC | Age: 56
End: 2021-02-04
Payer: COMMERCIAL

## 2021-02-04 ENCOUNTER — RESULT REVIEW (OUTPATIENT)
Age: 56
End: 2021-02-04

## 2021-02-04 VITALS
DIASTOLIC BLOOD PRESSURE: 66 MMHG | BODY MASS INDEX: 37.22 KG/M2 | SYSTOLIC BLOOD PRESSURE: 135 MMHG | HEART RATE: 89 BPM | WEIGHT: 260.01 LBS | HEIGHT: 70 IN | OXYGEN SATURATION: 100 %

## 2021-02-04 LAB
BASOPHILS # BLD AUTO: 0.1 K/UL — SIGNIFICANT CHANGE UP (ref 0–0.2)
BASOPHILS NFR BLD AUTO: 1.3 % — SIGNIFICANT CHANGE UP (ref 0–2)
EOSINOPHIL # BLD AUTO: 0.1 K/UL — SIGNIFICANT CHANGE UP (ref 0–0.5)
EOSINOPHIL NFR BLD AUTO: 2.4 % — SIGNIFICANT CHANGE UP (ref 0–6)
HCT VFR BLD CALC: 44.3 % — SIGNIFICANT CHANGE UP (ref 34.5–45)
HGB BLD-MCNC: 14 G/DL — SIGNIFICANT CHANGE UP (ref 11.5–15.5)
LYMPHOCYTES # BLD AUTO: 1.9 K/UL — SIGNIFICANT CHANGE UP (ref 1–3.3)
LYMPHOCYTES # BLD AUTO: 34.3 % — SIGNIFICANT CHANGE UP (ref 13–44)
MCHC RBC-ENTMCNC: 27.5 PG — SIGNIFICANT CHANGE UP (ref 27–34)
MCHC RBC-ENTMCNC: 31.6 G/DL — LOW (ref 32–36)
MCV RBC AUTO: 87.2 FL — SIGNIFICANT CHANGE UP (ref 80–100)
MONOCYTES # BLD AUTO: 0.7 K/UL — SIGNIFICANT CHANGE UP (ref 0–0.9)
MONOCYTES NFR BLD AUTO: 12.4 % — SIGNIFICANT CHANGE UP (ref 2–14)
NEUTROPHILS # BLD AUTO: 2.8 K/UL — SIGNIFICANT CHANGE UP (ref 1.8–7.4)
NEUTROPHILS NFR BLD AUTO: 49.6 % — SIGNIFICANT CHANGE UP (ref 43–77)
PLATELET # BLD AUTO: 304 K/UL — SIGNIFICANT CHANGE UP (ref 150–400)
RBC # BLD: 5.08 M/UL — SIGNIFICANT CHANGE UP (ref 3.8–5.2)
RBC # FLD: 13.9 % — SIGNIFICANT CHANGE UP (ref 10.3–14.5)
WBC # BLD: 5.7 K/UL — SIGNIFICANT CHANGE UP (ref 3.8–10.5)
WBC # FLD AUTO: 5.7 K/UL — SIGNIFICANT CHANGE UP (ref 3.8–10.5)

## 2021-02-04 PROCEDURE — 99072 ADDL SUPL MATRL&STAF TM PHE: CPT

## 2021-02-04 PROCEDURE — 99213 OFFICE O/P EST LOW 20 MIN: CPT

## 2021-02-05 LAB
ALBUMIN SERPL ELPH-MCNC: 4.5 G/DL
ALP BLD-CCNC: 93 U/L
ALT SERPL-CCNC: 30 U/L
ANION GAP SERPL CALC-SCNC: 12 MMOL/L
AST SERPL-CCNC: 22 U/L
BILIRUB SERPL-MCNC: 0.4 MG/DL
BUN SERPL-MCNC: 10 MG/DL
CALCIUM SERPL-MCNC: 10.1 MG/DL
CHLORIDE SERPL-SCNC: 105 MMOL/L
CO2 SERPL-SCNC: 24 MMOL/L
CREAT SERPL-MCNC: 0.94 MG/DL
GLUCOSE SERPL-MCNC: 92 MG/DL
POTASSIUM SERPL-SCNC: 4.5 MMOL/L
PROT SERPL-MCNC: 7.3 G/DL
SODIUM SERPL-SCNC: 141 MMOL/L

## 2021-02-05 NOTE — ASSESSMENT
[FreeTextEntry1] : Triple negative poorly differentiated invasive ductal breast cancer, in complete remission following initial neoadjuvant chemotherapy with complete pathologic response and subsequent bilateral nipple sparing mastectomies with HARSHIL reconstruction

## 2021-02-05 NOTE — HISTORY OF PRESENT ILLNESS
[de-identified] : This 55-year-old woman initially presented with a left breast mass in September 2019.  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  Neoadjuvant dose dense Adriamycin/Cytoxan was followed by weekly Taxol, completed in February 2020.  Left lumpectomy on March 30, 2020 showed complete pathologic response.  On June 24, 2020 nipple sparing bilateral mastectomy was performed.\par She remains free of recurrent disease.  Plastic surgery is following her D.  IEP flap reconstruction sites which have recently been revised\par  [de-identified] : Physical therapy has been helping with some of the tightness in the chest area, and there is been a slow steady improvement in stamina.

## 2021-02-05 NOTE — PHYSICAL EXAM
[Normal] : well developed, well nourished, in no acute distress [de-identified] : s/P bilateral mastectomy with reconstruction

## 2021-02-11 ENCOUNTER — APPOINTMENT (OUTPATIENT)
Dept: SURGERY | Facility: CLINIC | Age: 56
End: 2021-02-11

## 2021-05-07 ENCOUNTER — APPOINTMENT (OUTPATIENT)
Dept: PLASTIC SURGERY | Facility: CLINIC | Age: 56
End: 2021-05-07
Payer: COMMERCIAL

## 2021-05-07 PROCEDURE — 99072 ADDL SUPL MATRL&STAF TM PHE: CPT

## 2021-05-07 PROCEDURE — 99213 OFFICE O/P EST LOW 20 MIN: CPT

## 2021-05-07 NOTE — PHYSICAL EXAM
[de-identified] : b/l breasts soft, nt. incisions well healed. area of right breast fat necrosis softer.  [de-identified] : abdomen soft, nt. incisions well healed. no bulge or hernia. no hypertrophic scarring.

## 2021-05-07 NOTE — HISTORY OF PRESENT ILLNESS
[FreeTextEntry1] : 55 y.o. F history of triple negative left breast CA who is most recently s/p b/l nipple sparing mastectomies with HARSHIL flap reconstruction on 6/24/2020 and most recently she is s/p revision of b/l breast reconstruction and abdominal donor site scar on 10/27/20. She reports using a lymphedema pump for her right breast area of fat necrosis and performing manual massage as recommended by PT which she has found improvement with. She states she is otherwise happy.

## 2021-05-26 ENCOUNTER — APPOINTMENT (OUTPATIENT)
Dept: DERMATOLOGY | Facility: CLINIC | Age: 56
End: 2021-05-26
Payer: COMMERCIAL

## 2021-05-26 PROCEDURE — 99203 OFFICE O/P NEW LOW 30 MIN: CPT | Mod: 25

## 2021-05-26 PROCEDURE — 11900 INJECT SKIN LESIONS </W 7: CPT

## 2021-07-20 ENCOUNTER — APPOINTMENT (OUTPATIENT)
Dept: SURGERY | Facility: CLINIC | Age: 56
End: 2021-07-20
Payer: COMMERCIAL

## 2021-07-20 VITALS
BODY MASS INDEX: 37.08 KG/M2 | DIASTOLIC BLOOD PRESSURE: 79 MMHG | TEMPERATURE: 97.8 F | SYSTOLIC BLOOD PRESSURE: 129 MMHG | OXYGEN SATURATION: 98 % | WEIGHT: 259 LBS | HEIGHT: 70 IN | HEART RATE: 100 BPM

## 2021-07-20 DIAGNOSIS — Z90.13 ACQUIRED ABSENCE OF BILATERAL BREASTS AND NIPPLES: ICD-10-CM

## 2021-07-20 PROCEDURE — 99213 OFFICE O/P EST LOW 20 MIN: CPT

## 2021-07-20 NOTE — HISTORY OF PRESENT ILLNESS
[FreeTextEntry1] : I had the pleasure of seeing ZOE CARTER  in the office today for a clinical exam secondary to diagnosed and treated left breast cancer.\par \par  She is a nael 57 yo nulliparous  female who has been in stable overall health.\par \par She palpated a left breast mass in the shower on labor day (2019) and underwent diagnostic mammogram/ultrasound 9/13/2019. A biopsy was performed demonstrating poorly differentiated IDC [id number] negative with DCIS measuring 1.5 cm.\par \par She completed neoadjuvant chemotherapy in February 2020 with a complete response.  She underwent left leonel  lumpectomy with slnb on 3/30/2020 and final pathology demonstrated no residual carcinoma.  She then underwent bilateral nipple sparring mastectomy with HARSHIL on 6/24/2020 ( due to covid HARSHIL were not being performed and she underwent lumpectomy due to surgical window s/p neoadjuvant chemotherapy.\par \par Imaging : \par [facility]  - Breast WAIC BI W CAD #\par MRI 9/20/19\par 1. Biopsy-proven invasive ductal carcinoma/ductal carcinoma in situ measuring up to 4.5 cm the upper central left breast, as above. Additional nonmass enhancement is noted extending from the superior aspect of the mass posteriorly by approximately 3 cm. Of note, enhancement on MRI in this location is significantly larger than reported on outside imagining reports. Wide excision is recommended. Alternatively, MR guided biopsy of additional nonmass enhancement can be performed if will alter management. The patient is advised to submit prior examinations for comparison at which time an addendum to the current be issued. \par 2. No evidence of suspicious enhancement in the contralateral right breast.\par \par  [facility]\par Bilateral diagnostic mammogram/ultrasound 9/13/2019\par Impression: New 1.5cm solid mass left breast 12:00 Imaging features are indeterminate. No suspicious finding in the right breast. BIRADS 4: Suspicious\par US: Left breast 12:00 7cm FN 1.5cm hypoechoic round mass with slightly angular margins corresponding to the new mass on mammography. Imaging features are indeterminate. BIRADS 4: Suspicious\par \par Pathology: Left breast 12:00 7cm FN\par Poorly differentiated IDC ER- NC- [id number] - with DCIS \par \par MIDSTAGING MRI BREAST:\par 1/3/202  MR breast IC BI CAD\par Impression:  Near complete resolution of enhancement/mass at the 12:00 position of the left breast.  Nonspecific 4 mm enhancing focus within the left nipple.  If it would alter management, consider surgical sampling for further evaluation.  This is not amenable to image guided biopsy.  BIRADS 4A low suspicion.\par \par 3/6/2020  MR Breast\par Impression:  Left breast malignancy status post neoadjuvant chemotherapy.  There has been resolution of previously seen enhancement in the let upper central breast.  Again noted is a stable 4 mm enhancing focus with the left nipple.  As previously discussed, this findings is not amendable to image guided biopsy.  BIRADS 6\par \par 3/30/2020\par 1.  Left axilla, sentinel lymph node #1:  One lymph node negative for metastatic carcinoma (0/1).\par 2.  Breast, left (lumpectomy):  Adenosis, small cyst, stromal fibrosis, and microcalcifications.  fibroadenoma, 0.2 cm.  Biopsy site changes.  Residual carcinoma is not identified.\par 3.  Breast, left anterior margin of lumpectomy.  Breast and fibroadipose tissue, negative for malignancy.\par 4.  Breast, left, lateral margin of lumpectomy:  Breast and fibroadipose tissue, negative for malignancy.\par 5.  Breast, left, superior margin of lumpectomy:  Breast and fibroadipose tissue, negative for malignancy.\par 6.  Breast, left, posterior/deep margin of lumpectomy:  Fibroadipose tissue, negative for malignancy.\par 7.  Breast, left medial margin of lumpectomy:  Breast and fibroadipose tissue, negative for malignancy.\par 8.  Breast left inferior margin of lumpectomy:  Fibroadipose tissue negative for malignancy.\par \par 6/24/2020 Final surgical pathology\par 1. Breast right nipple sparring simple mastectomy:  Focal fibrocystic changes and benign epithelial calcifications.\par 2.  Breast right retro-nipple, biopsy:  Benign breast tissue.\par 3.  Breast left nipple sparring simple mastectomy:  No malignancy identified.  Fibrocystic changes usual ductal hyperplasia, sclerosing adenosis, nodular adenosis and benign epithelial calcifications.  Microscopic intraductal papilloma.  Prior procedure site changes.\par 4.  Breast left retro nipple biopsy:  Benign breast tissue.\par 5.  Internal mammary lymph node, right biopsy:  unremarkable fibroadipose tissue present.\par 6.  Internal mammary lymph node, left biopsy:  One lymph node negative for metastatic carcinoma.\par \par 1/29/2021  US breast right\par Impression:  No sonographic suspicious findings right breast.  Findings consistent with post surgical changes and small post surgical seroma retroareolar right breast.  BIRADS 2 benign\par \par I reviewed clinical exam with her and she understands there are no dominant masses on exam today.  She is to return the office in 6 months if imaging is benign.  \par \par She understands and agrees with plan.  All questions answered.\par

## 2021-07-20 NOTE — PHYSICAL EXAM
[Normocephalic] : normocephalic [Atraumatic] : atraumatic [EOMI] : extra ocular movement intact [PERRL] : pupils equal, round and reactive to light [Sclera nonicteric] : sclera nonicteric [Supple] : supple [No Supraclavicular Adenopathy] : no supraclavicular adenopathy [Examined in the supine and seated position] : examined in the supine and seated position [No dominant masses] : no dominant masses in right breast  [No dominant masses] : no dominant masses left breast [No Nipple Retraction] : no left nipple retraction [No Nipple Discharge] : no left nipple discharge [No Axillary Lymphadenopathy] : no left axillary lymphadenopathy [No Edema] : no edema [No Rashes] : no rashes [No Ulceration] : no ulceration [de-identified] : Mastectomy flap healing well.  [de-identified] : Mastectomy flap healing well.

## 2021-07-20 NOTE — ASSESSMENT
[FreeTextEntry1] : 57 yo perimenopausal  s/p bilateral nipple sparring mastectomy and HARSHIL reconstruction on 6/24/2020. She is well healed and no residual carcinoma is seen on final pathology. Clinical exam is benign today. Recommendation for:\par 1.  Follow up in 6 months\par 2.  Followup with Dr. Daniels\par 3.  Followup with Dr. Dailey\par \par

## 2021-08-10 ENCOUNTER — OUTPATIENT (OUTPATIENT)
Dept: OUTPATIENT SERVICES | Facility: HOSPITAL | Age: 56
LOS: 1 days | Discharge: HOME | End: 2021-08-10

## 2021-08-10 DIAGNOSIS — Z98.890 OTHER SPECIFIED POSTPROCEDURAL STATES: Chronic | ICD-10-CM

## 2021-08-10 DIAGNOSIS — Z45.2 ENCOUNTER FOR ADJUSTMENT AND MANAGEMENT OF VASCULAR ACCESS DEVICE: Chronic | ICD-10-CM

## 2021-08-10 DIAGNOSIS — C50.919 MALIGNANT NEOPLASM OF UNSPECIFIED SITE OF UNSPECIFIED FEMALE BREAST: ICD-10-CM

## 2021-08-10 DIAGNOSIS — L73.9 FOLLICULAR DISORDER, UNSPECIFIED: Chronic | ICD-10-CM

## 2021-08-11 ENCOUNTER — APPOINTMENT (OUTPATIENT)
Dept: HEMATOLOGY ONCOLOGY | Facility: CLINIC | Age: 56
End: 2021-08-11
Payer: COMMERCIAL

## 2021-08-11 ENCOUNTER — RESULT REVIEW (OUTPATIENT)
Age: 56
End: 2021-08-11

## 2021-08-11 VITALS
DIASTOLIC BLOOD PRESSURE: 83 MMHG | OXYGEN SATURATION: 98 % | BODY MASS INDEX: 37.13 KG/M2 | HEIGHT: 70 IN | RESPIRATION RATE: 14 BRPM | SYSTOLIC BLOOD PRESSURE: 128 MMHG | TEMPERATURE: 97.5 F | WEIGHT: 259.38 LBS | HEART RATE: 99 BPM

## 2021-08-11 LAB
BASOPHILS # BLD AUTO: 0.1 K/UL — SIGNIFICANT CHANGE UP (ref 0–0.2)
BASOPHILS NFR BLD AUTO: 1.4 % — SIGNIFICANT CHANGE UP (ref 0–2)
EOSINOPHIL # BLD AUTO: 0.1 K/UL — SIGNIFICANT CHANGE UP (ref 0–0.5)
EOSINOPHIL NFR BLD AUTO: 1.4 % — SIGNIFICANT CHANGE UP (ref 0–6)
HCT VFR BLD CALC: 45.8 % — HIGH (ref 34.5–45)
HGB BLD-MCNC: 14.1 G/DL — SIGNIFICANT CHANGE UP (ref 11.5–15.5)
LYMPHOCYTES # BLD AUTO: 2 K/UL — SIGNIFICANT CHANGE UP (ref 1–3.3)
LYMPHOCYTES # BLD AUTO: 33.2 % — SIGNIFICANT CHANGE UP (ref 13–44)
MCHC RBC-ENTMCNC: 27.7 PG — SIGNIFICANT CHANGE UP (ref 27–34)
MCHC RBC-ENTMCNC: 30.8 G/DL — LOW (ref 32–36)
MCV RBC AUTO: 89.9 FL — SIGNIFICANT CHANGE UP (ref 80–100)
MONOCYTES # BLD AUTO: 0.6 K/UL — SIGNIFICANT CHANGE UP (ref 0–0.9)
MONOCYTES NFR BLD AUTO: 9.7 % — SIGNIFICANT CHANGE UP (ref 2–14)
NEUTROPHILS # BLD AUTO: 3.2 K/UL — SIGNIFICANT CHANGE UP (ref 1.8–7.4)
NEUTROPHILS NFR BLD AUTO: 54.2 % — SIGNIFICANT CHANGE UP (ref 43–77)
PLATELET # BLD AUTO: 299 K/UL — SIGNIFICANT CHANGE UP (ref 150–400)
RBC # BLD: 5.09 M/UL — SIGNIFICANT CHANGE UP (ref 3.8–5.2)
RBC # FLD: 12.8 % — SIGNIFICANT CHANGE UP (ref 10.3–14.5)
WBC # BLD: 6 K/UL — SIGNIFICANT CHANGE UP (ref 3.8–10.5)
WBC # FLD AUTO: 6 K/UL — SIGNIFICANT CHANGE UP (ref 3.8–10.5)

## 2021-08-11 PROCEDURE — 99213 OFFICE O/P EST LOW 20 MIN: CPT

## 2021-08-12 LAB
ALBUMIN SERPL ELPH-MCNC: 4.6 G/DL
ALP BLD-CCNC: 104 U/L
ALT SERPL-CCNC: 29 U/L
ANION GAP SERPL CALC-SCNC: 12 MMOL/L
AST SERPL-CCNC: 30 U/L
BILIRUB SERPL-MCNC: 0.3 MG/DL
BUN SERPL-MCNC: 11 MG/DL
CALCIUM SERPL-MCNC: 10 MG/DL
CHLORIDE SERPL-SCNC: 102 MMOL/L
CO2 SERPL-SCNC: 26 MMOL/L
CREAT SERPL-MCNC: 0.8 MG/DL
GLUCOSE SERPL-MCNC: 102 MG/DL
POTASSIUM SERPL-SCNC: 4.4 MMOL/L
PROT SERPL-MCNC: 7.3 G/DL
SODIUM SERPL-SCNC: 140 MMOL/L

## 2021-08-12 NOTE — PHYSICAL EXAM
[Normal] : well developed, well nourished, in no acute distress [de-identified] : s/P bilateral mastectomy with reconstruction

## 2021-08-12 NOTE — ASSESSMENT
[FreeTextEntry1] : \par Triple negative poorly differentiated invasive ductal breast cancer, in complete remission following initial neoadjuvant chemotherapy with complete pathologic response and subsequent bilateral nipple sparing mastectomies with HARSHIL reconstruction. \par \par  \par Plan\par Continue surveillance with 6-month follow-up.   \par

## 2021-08-12 NOTE — HISTORY OF PRESENT ILLNESS
Problem: Mobility Impaired (Adult and Pediatric)  Goal: *Acute Goals and Plan of Care (Insert Text)  Note: GOALS (1-4 days):  (1.)Ms. Ravin Álvarez will move from supine to sit and sit to supine  in bed with SUPERVISION. (2.)Ms. Ravin lÁvarez will transfer from bed to chair and chair to bed with SUPERVISION using the least restrictive device. (3.)Ms. Ravin Álvarez will ambulate with SUPERVISION for 125 feet with the least restrictive device. (4.)Ms. Ravin Álvarez will ambulate up/down 4 steps with bilateral  railing with CONTACT GUARD ASSIST with no device. (5.)Ms. Ravin Álvarez will increase right knee ROM to 5°-80°.  ________________________________________________________________________________________________      PHYSICAL THERAPY JOINT CAMP TKA: Daily Note and PM 3/19/2020  INPATIENT: Hospital Day: 2  Payor: [employer] / Plan: BLAKE Cardenas. / Product Type: Commerical /      NAME/AGE/GENDER: Sarah Thomas is a 78 y.o. female   PRIMARY DIAGNOSIS:  Primary osteoarthritis of right knee [M17.11]   Procedure(s) and Anesthesia Type:     * RIGHT TARAS ROBOTIC TOTAL KNEE ARTHROPLASTY CEMENTED TKA SPINAL/ ADDUCTOR CANAL BLOCK DENNYS - Spinal (Right)  ICD-10: Treatment Diagnosis:    · Pain in Right Knee (M25.561)  · Stiffness of Right Knee, Not elsewhere classified (M25.661)  · Other abnormalities of gait and mobility (R26.89)      ASSESSMENT:     Ms. Ravin Álvarez presents S/P R TKA and is demonstrating a decline in her premorbid level of function. She would benefit from further PT while here to address these deficits: decreased R LE strength and ROM, standing balance, functional mobility and TKA awareness. She plans on going home at [place] with the support of her son. This am she needs to use the restroom. Assisted her to the [facility], she then amb in room and then stays up in recliner. She completes R LE exs. Wants to possibly go home tomorrow am  3/19 am stated I am having pain, that shot did not work. Performs TK  Exercises. Supine>EOB with Min A with some pain. Walk 30 ft using RW with Min A and work on gait pattern. Left with OT.  3/19 pm  Sitting in the chair upon arrival.  Performs exercises with less pain this afternoon. Increase distance using correct technique and no LOB. Remain in the chair with needs in reach. This section established at most recent assessment   PROBLEM LIST (Impairments causing functional limitations):  1. Decreased Strength  2. Decreased ADL/Functional Activities  3. Decreased Transfer Abilities  4. Decreased Ambulation Ability/Technique  5. Decreased Balance  6. Increased Pain  7. Decreased Activity Tolerance  8. Increased Fatigue  9. Decreased Flexibility/Joint Mobility  10. Decreased Knowledge of Precautions  11. Decreased [place] with Home Exercise Program   INTERVENTIONS PLANNED: (Benefits and precautions of physical therapy have been discussed with the patient.)  1. Balance Exercise  2. Bed Mobility  3. Cold  4. Family Education  5. Gait Training  6. Home Exercise Program (HEP)  7. Therapeutic Activites  8. Therapeutic Exercise/Strengthening  9. Transfer Training  10. TKA education  11. Range of Motion: active/assisted/passive  12. Therapeutic Activities     TREATMENT PLAN: Frequency/Duration: Follow patient BID for duration of hospital stay to address above goals. Rehabilitation Potential For Stated Goals: Good     RECOMMENDED REHABILITATION/EQUIPMENT: (at time of discharge pending progress): Continue Skilled Therapy and Home Health: Physical Therapy. HISTORY:   History of Present Injury/Illness (Reason for Referral):  S/p R TKA  Past Medical History/Comorbidities:   Ms. Mary Huff  has a past medical history of Adverse effect of anesthesia, Arthritis, Claustrophobia, Former cigarette smoker, GERD (gastroesophageal reflux disease), History of melanoma, Hypertension, and Ureteral stone.   Ms. Mary Huff  has a past surgical history that includes hx cataract removal (Bilateral, [de-identified] : \par This 56-year-old woman initially presented with a left breast mass in September 2019. 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 Neoadjuvant dose dense Adriamycin/Cytoxan was followed by weekly Taxol, completed in February 2020. Left lumpectomy on March 30, 2020 showed complete pathologic response. On June 24, 2020 nipple sparing bilateral mastectomy was performed.\par She remains free of recurrent disease. Plastic surgery is following her  HARSHIL flap reconstruction sites which have recently been revised\par  12/2018,01/2019); hx tubal ligation; hx other surgical; and hx lithotripsy (03/05/2020).   Social History/Living Environment:   Home Environment: Private residence  # Steps to Enter: 14  Hand Rails : Right  One/Two Story Residence: One story  Living Alone: Yes  Support Systems: Child(paddy)  Patient Expects to be Discharged to[de-identified] Private residence  Current DME Used/Available at Home: Walker, rolling, [address] chair  Tub or Shower Type: Tub/Shower combination  Prior Level of Function/Work/Activity:  functionally I with ADls and amb   Number of Personal Factors/Comorbidities that affect the Plan of Care: 1-2: MODERATE COMPLEXITY   EXAMINATION:   Most Recent Physical Functioning:    L LE ROM WFL       Strength 3+/5       Sensation slightly diminished from spinal    R LE ROM knee 20-60       Strength hip flex, knee ext and ankle DF 2+      Sensation slightly diminished from spinal    BALANCE: Sitting good      Standing fair with support of RW    TRANSFERS; sit to stand min A     Stand to sit CGA       SPT with min A and RW      Toilet min A with RW    BED mOBILITY; Supine to sit with min A                        Bed Mobility  Supine to Sit: Contact guard assistance  Sit to Supine: (left up in chair)    Transfers  Sit to Stand: Contact guard assistance  Stand to Sit: Contact guard assistance  Bed to Chair: Contact guard assistance    Balance  Sitting: Intact  Standing: With support     AMB  30' with RW and close CGA       Antalgic, step to gt pattern     Decreased step length on L      Decreased stance on R         Weight Bearing Status  Right Side Weight Bearing: As tolerated  Distance (ft): 120 Feet (ft)  Ambulation - Level of Assistance: Contact guard assistance  Assistive Device: Walker, rolling  Speed/[place]: Pace decreased (<100 feet/min)  Step Length: Left shortened  Stance: Right decreased  Gait Abnormalities: Antalgic  Interventions: Safety awareness training     Braces/Orthotics:    Right Knee Cold  Type: Cryocuff [de-identified] : Anxious re: risk of recurrence, but overall doing well. Body Structures Involved:  1. Bones  2. Joints  3. Muscles Body Functions Affected:  1. Neuromusculoskeletal  2. Movement Related Activities and Participation Affected:  1. General Tasks and Demands  2. Mobility  3. Self Care   Number of elements that affect the Plan of Care: 4+: HIGH COMPLEXITY   CLINICAL PRESENTATION:   Presentation: Stable and uncomplicated: LOW COMPLEXITY   CLINICAL DECISION MAKIN[address] [place] [address] AM-PAC 6 Clicks   Basic Mobility Inpatient Short Form  How much difficulty does the patient currently have. .. Unable A Lot A Little None   1. Turning over in bed (including adjusting bedclothes, sheets and blankets)? [] 1   [] 2   [x] 3   [] 4   2. Sitting down on and standing up from a chair with arms ( e.g., wheelchair, bedside commode, etc.)   [] 1   [] 2   [x] 3   [] 4   3. Moving from lying on back to sitting on the side of the bed? [] 1   [] 2   [x] 3   [] 4   How much help from another person does the patient currently need. .. Total A Lot A Little None   4. Moving to and from a bed to a chair (including a wheelchair)? [] 1   [] 2   [x] 3   [] 4   5. Need to walk in hospital room? [] 1   [] 2   [x] 3   [] 4   6. Climbing 3-5 steps with a railing? [] 1   [x] 2   [] 3   [] 4   © , Trustees of [address] [place] [address], under license to [employer]. All rights reserved     Score:  Initial: 17 Most Recent: X (Date: -- )    Interpretation of Tool:  Represents activities that are increasingly more difficult (i.e. Bed mobility, Transfers, Gait). Medical Necessity:     · Patient demonstrates good rehab potential due to higher previous functional level. Reason for Services/Other Comments:  · Patient continues to require present interventions due to patient's inability to perform functional mobility at a safe supervision level.    Use of outcome tool(s) and clinical judgement create a POC that gives a: Clear prediction of patient's progress: LOW COMPLEXITY            TREATMENT: (In addition to Assessment/Re-Assessment sessions the following treatments were rendered)     Pre-treatment Symptoms/Complaints:  Less pain this afternoon  Pain Initial:   Pain Intensity 1: 3  Post Session:       Therapeutic Exercise: (15 Minutes ):  Exercises per grid below to improve mobility, strength and coordination. Required minimal visual, verbal and tactile cues to promote proper body alignment and promote proper body breathing techniques. Progressed range, repetitions and complexity of movement as indicated. Gait Training (15 Minutes):  Gait training to improve and/or restore physical functioning as related to mobility. Ambulated 120 Feet (ft) with Contact guard assistance using a Walker, rolling and minimal Safety awareness training related to their knee position and motion to promote proper body alignment. Date:  3/18/20 Date:  3/19   Date:     ACTIVITY/EXERCISE AM PM AM PM AM PM   GROUP THERAPY  []  []  []  []  []  []   Ankle Pumps 10  15 15     Quad Sets 10  15 15     Gluteal Sets 10  15 15     Hip ABd/ADduction 10  15 15     Straight Leg Raises 10AA  15 aa 15     Knee Slides 10AA  15 aa 15     Short Arc Quads 10  15 15     Long Arc Quads         Chair Slides    15              B = bilateral; AA = active assistive; A = active; P = passive      Treatment/Session Assessment:     Response to Treatment:  Making good progress. Education:  [x] Home Exercises  [x] Fall Precautions  []  [] D/C Instruction Review  [] Knee Prosthesis Review  [x] Walker Management/Safety [] Adaptive Equipment as Needed       Interdisciplinary Collaboration:   o Physical Therapy Assistant  o Registered Nurse    After treatment position/precautions:   o Up in chair  o Bed/Chair-wheels locked  o Call light within reach  o RN notified  o Family at bedside    Compliance with Program/Exercises: Will assess as treatment progresses.     Recommendations/Intent for next treatment session:  Treatment next visit will focus on increasing Ms. Yazan's independence with bed mobility, transfers, gait training, strength/ROM exercises, modalities for pain, and patient education.       Total Treatment Duration:  PT Patient Time In/Time Out  Time In: 1335  Time Out: [address] [place], [place]

## 2021-08-30 ENCOUNTER — OUTPATIENT (OUTPATIENT)
Dept: OUTPATIENT SERVICES | Facility: HOSPITAL | Age: 56
LOS: 1 days | End: 2021-08-30
Payer: COMMERCIAL

## 2021-08-30 ENCOUNTER — APPOINTMENT (OUTPATIENT)
Dept: OTOLARYNGOLOGY | Facility: CLINIC | Age: 56
End: 2021-08-30

## 2021-08-30 ENCOUNTER — APPOINTMENT (OUTPATIENT)
Dept: MRI IMAGING | Facility: CLINIC | Age: 56
End: 2021-08-30
Payer: COMMERCIAL

## 2021-08-30 DIAGNOSIS — L73.9 FOLLICULAR DISORDER, UNSPECIFIED: Chronic | ICD-10-CM

## 2021-08-30 DIAGNOSIS — Z98.890 OTHER SPECIFIED POSTPROCEDURAL STATES: Chronic | ICD-10-CM

## 2021-08-30 DIAGNOSIS — Z00.8 ENCOUNTER FOR OTHER GENERAL EXAMINATION: ICD-10-CM

## 2021-08-30 DIAGNOSIS — Z45.2 ENCOUNTER FOR ADJUSTMENT AND MANAGEMENT OF VASCULAR ACCESS DEVICE: Chronic | ICD-10-CM

## 2021-08-30 PROCEDURE — C8908: CPT

## 2021-08-30 PROCEDURE — C8937: CPT

## 2021-08-30 PROCEDURE — 77049 MRI BREAST C-+ W/CAD BI: CPT | Mod: 26

## 2021-08-30 PROCEDURE — A9585: CPT

## 2021-09-20 ENCOUNTER — RESULT CHARGE (OUTPATIENT)
Age: 56
End: 2021-09-20

## 2021-09-20 ENCOUNTER — APPOINTMENT (OUTPATIENT)
Dept: ENDOCRINOLOGY | Facility: CLINIC | Age: 56
End: 2021-09-20
Payer: COMMERCIAL

## 2021-09-20 VITALS — WEIGHT: 262 LBS | HEART RATE: 88 BPM | BODY MASS INDEX: 37.51 KG/M2 | OXYGEN SATURATION: 97 % | HEIGHT: 70 IN

## 2021-09-20 VITALS
HEART RATE: 88 BPM | DIASTOLIC BLOOD PRESSURE: 82 MMHG | BODY MASS INDEX: 37.51 KG/M2 | SYSTOLIC BLOOD PRESSURE: 126 MMHG | OXYGEN SATURATION: 97 % | WEIGHT: 262 LBS | HEIGHT: 70 IN

## 2021-09-20 DIAGNOSIS — E07.89 OTHER SPECIFIED DISORDERS OF THYROID: ICD-10-CM

## 2021-09-20 PROCEDURE — 99204 OFFICE O/P NEW MOD 45 MIN: CPT | Mod: 25

## 2021-09-20 PROCEDURE — 83036 HEMOGLOBIN GLYCOSYLATED A1C: CPT | Mod: QW

## 2021-09-20 RX ORDER — DOCUSATE SODIUM 100 MG/1
100 CAPSULE ORAL TWICE DAILY
Qty: 20 | Refills: 0 | Status: DISCONTINUED | COMMUNITY
Start: 2020-10-22 | End: 2021-09-20

## 2021-09-20 RX ORDER — ONDANSETRON 4 MG/1
4 TABLET, ORALLY DISINTEGRATING ORAL EVERY 6 HOURS
Qty: 30 | Refills: 0 | Status: DISCONTINUED | COMMUNITY
Start: 2020-10-22 | End: 2021-09-20

## 2021-09-20 RX ORDER — OXYCODONE 5 MG/1
5 TABLET ORAL
Qty: 30 | Refills: 0 | Status: DISCONTINUED | COMMUNITY
Start: 2020-10-22 | End: 2021-09-20

## 2021-09-20 NOTE — ASSESSMENT
[FreeTextEntry1] : Pre-DM\par -Reviewed risk/complication of uncontrolled DM \par -Increase exercise as tolerated 5 days a week x 30 mins/day\par -Increase dietary efforts, low carb/low sugar\par -Repeat HgbA1C now\par -Based on lab results, possibly decrease Metformin\par \par Hyperprolactinemia \par -Check Prolactin Levels\par \par Thyroid Fullness\par -Send for thyroid u/s\par \par Send for fasting labs \par Call with results and formulate plan\par \par RTO in 6 months if labs are stable

## 2021-09-20 NOTE — REVIEW OF SYSTEMS
[Fatigue] : no fatigue [Recent Weight Gain (___ Lbs)] : no recent weight gain [Recent Weight Loss (___ Lbs)] : no recent weight loss [Visual Field Defect] : no visual field defect [Blurred Vision] : no blurred vision [Dysphagia] : no dysphagia [Neck Pain] : no neck pain [Chest Pain] : no chest pain [Palpitations] : no palpitations [Shortness Of Breath] : no shortness of breath [Nausea] : no nausea [Constipation] : no constipation [Vomiting] : no vomiting [Diarrhea] : no diarrhea [Polyuria] : no polyuria [Polydipsia] : no polydipsia

## 2021-09-20 NOTE — PHYSICAL EXAM
[Alert] : alert [Normal Sclera/Conjunctiva] : normal sclera/conjunctiva [Normal Hearing] : hearing was normal [No Respiratory Distress] : no respiratory distress [Clear to Auscultation] : lungs were clear to auscultation bilaterally [Normal S1, S2] : normal S1 and S2 [Normal Rate] : heart rate was normal [No Stigmata of Cushings Syndrome] : no stigmata of Cushings Syndrome [Normal Gait] : normal gait [Oriented x3] : oriented to person, place, and time [de-identified] : Fullness of thyroid

## 2021-09-20 NOTE — HISTORY OF PRESENT ILLNESS
[FreeTextEntry1] : New patient switching endocrinologist fro evaluation of Pre-Diabetes and prolactinoma. Pt was on Cabergoline in past but was tapered off about 3 years ago after going through menopause. Breast Cancer diagnosed 9/2019, double mastectomy with reconstruction in 6/2020. In remission, CT-Scan 8/30/21, no lesions found. 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

## 2021-09-23 ENCOUNTER — LABORATORY RESULT (OUTPATIENT)
Age: 56
End: 2021-09-23

## 2021-09-23 ENCOUNTER — OUTPATIENT (OUTPATIENT)
Dept: OUTPATIENT SERVICES | Facility: HOSPITAL | Age: 56
LOS: 1 days | End: 2021-09-23
Payer: COMMERCIAL

## 2021-09-23 ENCOUNTER — APPOINTMENT (OUTPATIENT)
Dept: ULTRASOUND IMAGING | Facility: CLINIC | Age: 56
End: 2021-09-23
Payer: COMMERCIAL

## 2021-09-23 DIAGNOSIS — Z45.2 ENCOUNTER FOR ADJUSTMENT AND MANAGEMENT OF VASCULAR ACCESS DEVICE: Chronic | ICD-10-CM

## 2021-09-23 DIAGNOSIS — E07.89 OTHER SPECIFIED DISORDERS OF THYROID: ICD-10-CM

## 2021-09-23 DIAGNOSIS — L73.9 FOLLICULAR DISORDER, UNSPECIFIED: Chronic | ICD-10-CM

## 2021-09-23 DIAGNOSIS — Z98.890 OTHER SPECIFIED POSTPROCEDURAL STATES: Chronic | ICD-10-CM

## 2021-09-23 PROCEDURE — 76536 US EXAM OF HEAD AND NECK: CPT

## 2021-09-23 PROCEDURE — 76536 US EXAM OF HEAD AND NECK: CPT | Mod: 26

## 2021-09-24 ENCOUNTER — TRANSCRIPTION ENCOUNTER (OUTPATIENT)
Age: 56
End: 2021-09-24

## 2021-09-27 LAB — GLUCOSE BLDC GLUCOMTR-MCNC: 94

## 2021-09-28 ENCOUNTER — NON-APPOINTMENT (OUTPATIENT)
Age: 56
End: 2021-09-28

## 2021-09-28 ENCOUNTER — TRANSCRIPTION ENCOUNTER (OUTPATIENT)
Age: 56
End: 2021-09-28

## 2021-10-25 ENCOUNTER — TRANSCRIPTION ENCOUNTER (OUTPATIENT)
Age: 56
End: 2021-10-25

## 2021-11-01 ENCOUNTER — APPOINTMENT (OUTPATIENT)
Dept: INTERNAL MEDICINE | Facility: CLINIC | Age: 56
End: 2021-11-01
Payer: COMMERCIAL

## 2021-11-01 PROCEDURE — 90686 IIV4 VACC NO PRSV 0.5 ML IM: CPT

## 2021-11-01 PROCEDURE — G0008: CPT

## 2021-11-02 ENCOUNTER — APPOINTMENT (OUTPATIENT)
Dept: OBGYN | Facility: CLINIC | Age: 56
End: 2021-11-02

## 2021-11-29 ENCOUNTER — OUTPATIENT (OUTPATIENT)
Dept: OUTPATIENT SERVICES | Facility: HOSPITAL | Age: 56
LOS: 1 days | End: 2021-11-29
Payer: COMMERCIAL

## 2021-11-29 ENCOUNTER — APPOINTMENT (OUTPATIENT)
Dept: CT IMAGING | Facility: CLINIC | Age: 56
End: 2021-11-29
Payer: COMMERCIAL

## 2021-11-29 DIAGNOSIS — Z98.890 OTHER SPECIFIED POSTPROCEDURAL STATES: Chronic | ICD-10-CM

## 2021-11-29 DIAGNOSIS — Z00.00 ENCOUNTER FOR GENERAL ADULT MEDICAL EXAMINATION WITHOUT ABNORMAL FINDINGS: ICD-10-CM

## 2021-11-29 DIAGNOSIS — L73.9 FOLLICULAR DISORDER, UNSPECIFIED: Chronic | ICD-10-CM

## 2021-11-29 DIAGNOSIS — Z45.2 ENCOUNTER FOR ADJUSTMENT AND MANAGEMENT OF VASCULAR ACCESS DEVICE: Chronic | ICD-10-CM

## 2021-11-29 DIAGNOSIS — Z00.8 ENCOUNTER FOR OTHER GENERAL EXAMINATION: ICD-10-CM

## 2021-11-29 PROCEDURE — 73700 CT LOWER EXTREMITY W/O DYE: CPT

## 2021-11-29 PROCEDURE — 73700 CT LOWER EXTREMITY W/O DYE: CPT | Mod: 26,RT

## 2021-12-07 LAB
APPEARANCE: CLEAR
BACTERIA UR CULT: NORMAL
BACTERIA: NEGATIVE
BILIRUBIN URINE: NEGATIVE
BLOOD URINE: NEGATIVE
COLOR: YELLOW
GLUCOSE QUALITATIVE U: NORMAL MG/DL
HYALINE CASTS: 1 /LPF
KETONES URINE: NEGATIVE
LEUKOCYTE ESTERASE URINE: NEGATIVE
MICROSCOPIC-UA: NORMAL
NITRITE URINE: NEGATIVE
PH URINE: 6.5
PROTEIN URINE: NEGATIVE MG/DL
RED BLOOD CELLS URINE: 1 /HPF
SPECIFIC GRAVITY URINE: 1.03
SQUAMOUS EPITHELIAL CELLS: 5 /HPF
UROBILINOGEN URINE: NORMAL MG/DL
WHITE BLOOD CELLS URINE: 1 /HPF

## 2021-12-22 DIAGNOSIS — Z11.59 ENCOUNTER FOR SCREENING FOR OTHER VIRAL DISEASES: ICD-10-CM

## 2021-12-22 DIAGNOSIS — Z01.84 ENCOUNTER FOR ANTIBODY RESPONSE EXAMINATION: ICD-10-CM

## 2021-12-23 ENCOUNTER — LABORATORY RESULT (OUTPATIENT)
Age: 56
End: 2021-12-23

## 2021-12-23 LAB
COVID-19 SPIKE DOMAIN ANTIBODY INTERPRETATION: POSITIVE
SARS-COV-2 AB SERPL IA-ACNC: >250 U/ML

## 2021-12-24 LAB — SARS-COV-2 N GENE NPH QL NAA+PROBE: NOT DETECTED

## 2022-01-06 ENCOUNTER — APPOINTMENT (OUTPATIENT)
Dept: OBGYN | Facility: CLINIC | Age: 57
End: 2022-01-06
Payer: COMMERCIAL

## 2022-01-06 VITALS
DIASTOLIC BLOOD PRESSURE: 70 MMHG | WEIGHT: 255 LBS | BODY MASS INDEX: 36.51 KG/M2 | HEIGHT: 70 IN | SYSTOLIC BLOOD PRESSURE: 120 MMHG

## 2022-01-06 DIAGNOSIS — Z01.419 ENCOUNTER FOR GYNECOLOGICAL EXAMINATION (GENERAL) (ROUTINE) W/OUT ABNORMAL FINDINGS: ICD-10-CM

## 2022-01-06 PROCEDURE — 99396 PREV VISIT EST AGE 40-64: CPT

## 2022-01-06 RX ORDER — CETIRIZINE HCL 10 MG
TABLET ORAL
Refills: 0 | Status: ACTIVE | COMMUNITY

## 2022-01-06 RX ORDER — MONTELUKAST 10 MG/1
10 TABLET, FILM COATED ORAL
Refills: 0 | Status: ACTIVE | COMMUNITY

## 2022-01-06 NOTE — PHYSICAL EXAM
[Chaperone Present] : A chaperone was present in the examining room during all aspects of the physical examination [Appropriately responsive] : appropriately responsive [Alert] : alert [No Acute Distress] : no acute distress [Soft] : soft [Non-tender] : non-tender [Non-distended] : non-distended [No HSM] : No HSM [No Lesions] : no lesions [No Mass] : no mass [Oriented x3] : oriented x3 [Right Breast Absent] : a total mastectomy [Breast Reconstruction Right] : breast reconstruction [Left Breast Absent] : a total mastectomy [Breast Reconstruction Left] : breast reconstruction [No Masses] : no breast masses were palpable [Labia Majora] : normal [Labia Minora] : normal [Normal] : normal [Uterine Adnexae] : normal [No Tenderness] : no tenderness [Nl Sphincter Tone] : normal sphincter tone

## 2022-01-06 NOTE — HISTORY OF PRESENT ILLNESS
[Y] : Patient is sexually active [Menarche Age: ____] : age at menarche was [unfilled] [Menopause Age: ____] : age at menopause was [unfilled] [PGHxTotal] : 0 [PGHxFullTerm] : 0 [PGHxPremature] : 0 [PGHxAbortions] : 0 [[facility]Living] : 0 [PGHxABInduced] : 0 [PGHxABSpont] : 0 [PGHxEctopic] : 0 [PGHxMultBirths] : 0

## 2022-01-07 ENCOUNTER — APPOINTMENT (OUTPATIENT)
Dept: PLASTIC SURGERY | Facility: CLINIC | Age: 57
End: 2022-01-07

## 2022-01-07 LAB — HPV HIGH+LOW RISK DNA PNL CVX: NOT DETECTED

## 2022-01-12 DIAGNOSIS — Z20.822 CONTACT WITH AND (SUSPECTED) EXPOSURE TO COVID-19: ICD-10-CM

## 2022-01-12 LAB — CYTOLOGY CVX/VAG DOC THIN PREP: NORMAL

## 2022-01-19 ENCOUNTER — TRANSCRIPTION ENCOUNTER (OUTPATIENT)
Age: 57
End: 2022-01-19

## 2022-01-20 ENCOUNTER — TRANSCRIPTION ENCOUNTER (OUTPATIENT)
Age: 57
End: 2022-01-20

## 2022-01-24 ENCOUNTER — NON-APPOINTMENT (OUTPATIENT)
Age: 57
End: 2022-01-24

## 2022-01-24 ENCOUNTER — APPOINTMENT (OUTPATIENT)
Dept: INTERNAL MEDICINE | Facility: CLINIC | Age: 57
End: 2022-01-24
Payer: COMMERCIAL

## 2022-01-24 VITALS
BODY MASS INDEX: 36.59 KG/M2 | RESPIRATION RATE: 12 BRPM | WEIGHT: 255 LBS | HEART RATE: 72 BPM | SYSTOLIC BLOOD PRESSURE: 118 MMHG | DIASTOLIC BLOOD PRESSURE: 78 MMHG

## 2022-01-24 DIAGNOSIS — Z20.822 CONTACT WITH AND (SUSPECTED) EXPOSURE TO COVID-19: ICD-10-CM

## 2022-01-24 DIAGNOSIS — M17.0 BILATERAL PRIMARY OSTEOARTHRITIS OF KNEE: ICD-10-CM

## 2022-01-24 DIAGNOSIS — Z00.00 ENCOUNTER FOR GENERAL ADULT MEDICAL EXAMINATION W/OUT ABNORMAL FINDINGS: ICD-10-CM

## 2022-01-24 PROCEDURE — 99396 PREV VISIT EST AGE 40-64: CPT | Mod: 25

## 2022-01-24 PROCEDURE — 93000 ELECTROCARDIOGRAM COMPLETE: CPT

## 2022-01-24 RX ORDER — CELECOXIB 200 MG/1
200 CAPSULE ORAL TWICE DAILY
Qty: 20 | Refills: 0 | Status: DISCONTINUED | COMMUNITY
Start: 2020-10-22 | End: 2022-01-24

## 2022-01-24 NOTE — HEALTH RISK ASSESSMENT
[Good] : ~his/her~ current health as good [Former] : Former [Yes] : Yes [1 or 2 (0 pts)] : 1 or 2 (0 points) [No falls in past year] : Patient reported no falls in the past year [0] : 2) Feeling down, depressed, or hopeless: Not at all (0) [Change in mental status noted] : No change in mental status noted [Language] : denies difficulty with language [Behavior] : denies difficulty with behavior [Learning/Retaining New Information] : denies difficulty learning/retaining new information [Handling Complex Tasks] : denies difficulty handling complex tasks [Reasoning] : denies difficulty with reasoning [Spatial Ability and Orientation] : denies difficulty with spatial ability and orientation [MammogramDate] : masectomy [ColonoscopyDate] : due

## 2022-01-24 NOTE — ASSESSMENT
[FreeTextEntry1] : breast cancer resolved\par left knee replacement planned\par thyroid stable\par bp stable\par check prolactin level\par ekg ok\par dm see ophto\par get colonscopy\par 
28-Oct-2017

## 2022-01-24 NOTE — HISTORY OF PRESENT ILLNESS
[FreeTextEntry1] : For CPE [de-identified] : For CPE\par history of breast cancer, bilater masectomy with bilateral breast reconstruction\par her for cpe has not been seen in a number of years.\par will be having left knee replacement in April\par has history of prolactinima small

## 2022-01-25 LAB
25(OH)D3 SERPL-MCNC: 31.2 NG/ML
ALBUMIN SERPL ELPH-MCNC: 4.6 G/DL
ALP BLD-CCNC: 85 U/L
ALT SERPL-CCNC: 31 U/L
ANION GAP SERPL CALC-SCNC: 13 MMOL/L
APPEARANCE: CLEAR
AST SERPL-CCNC: 23 U/L
BASOPHILS # BLD AUTO: 0.03 K/UL
BASOPHILS NFR BLD AUTO: 0.5 %
BILIRUB SERPL-MCNC: 0.3 MG/DL
BILIRUBIN URINE: NEGATIVE
BLOOD URINE: NEGATIVE
BUN SERPL-MCNC: 15 MG/DL
CALCIUM SERPL-MCNC: 9.7 MG/DL
CHLORIDE SERPL-SCNC: 104 MMOL/L
CHOLEST SERPL-MCNC: 245 MG/DL
CO2 SERPL-SCNC: 25 MMOL/L
COLOR: NORMAL
CREAT SERPL-MCNC: 0.82 MG/DL
EOSINOPHIL # BLD AUTO: 0.1 K/UL
EOSINOPHIL NFR BLD AUTO: 1.6 %
ESTIMATED AVERAGE GLUCOSE: 114 MG/DL
GLUCOSE QUALITATIVE U: NEGATIVE
GLUCOSE SERPL-MCNC: 96 MG/DL
HBA1C MFR BLD HPLC: 5.6 %
HCT VFR BLD CALC: 40.1 %
HDLC SERPL-MCNC: 78 MG/DL
HGB BLD-MCNC: 12.9 G/DL
IMM GRANULOCYTES NFR BLD AUTO: 0.2 %
KETONES URINE: NEGATIVE
LDLC SERPL CALC-MCNC: 132 MG/DL
LEUKOCYTE ESTERASE URINE: NEGATIVE
LYMPHOCYTES # BLD AUTO: 2.54 K/UL
LYMPHOCYTES NFR BLD AUTO: 41.8 %
MAN DIFF?: NORMAL
MCHC RBC-ENTMCNC: 27.9 PG
MCHC RBC-ENTMCNC: 32.2 GM/DL
MCV RBC AUTO: 86.8 FL
MONOCYTES # BLD AUTO: 0.58 K/UL
MONOCYTES NFR BLD AUTO: 9.5 %
NEUTROPHILS # BLD AUTO: 2.82 K/UL
NEUTROPHILS NFR BLD AUTO: 46.4 %
NITRITE URINE: NEGATIVE
NONHDLC SERPL-MCNC: 166 MG/DL
PH URINE: 6
PLATELET # BLD AUTO: 347 K/UL
POTASSIUM SERPL-SCNC: 4.4 MMOL/L
PROLACTIN SERPL-MCNC: 11 NG/ML
PROT SERPL-MCNC: 7 G/DL
PROTEIN URINE: NEGATIVE
RBC # BLD: 4.62 M/UL
RBC # FLD: 14.1 %
SODIUM SERPL-SCNC: 142 MMOL/L
SPECIFIC GRAVITY URINE: >=1.03
T4 FREE SERPL-MCNC: 1.4 NG/DL
TRIGL SERPL-MCNC: 173 MG/DL
TSH SERPL-ACNC: 0.75 UIU/ML
UROBILINOGEN URINE: NORMAL
WBC # FLD AUTO: 6.08 K/UL

## 2022-01-28 ENCOUNTER — APPOINTMENT (OUTPATIENT)
Dept: INTERNAL MEDICINE | Facility: CLINIC | Age: 57
End: 2022-01-28

## 2022-01-28 ENCOUNTER — NON-APPOINTMENT (OUTPATIENT)
Age: 57
End: 2022-01-28

## 2022-01-30 LAB
MONOMERIC PROLACTIN (ICMA)*: 9.02 NG/ML
PERCENT MACROPROLACTIN: 23 %
PROLACTIN, SERUM (ICMA)*: 11.7 NG/ML

## 2022-02-03 ENCOUNTER — OUTPATIENT (OUTPATIENT)
Dept: OUTPATIENT SERVICES | Facility: HOSPITAL | Age: 57
LOS: 1 days | Discharge: HOME | End: 2022-02-03

## 2022-02-03 DIAGNOSIS — Z98.890 OTHER SPECIFIED POSTPROCEDURAL STATES: Chronic | ICD-10-CM

## 2022-02-03 DIAGNOSIS — Z45.2 ENCOUNTER FOR ADJUSTMENT AND MANAGEMENT OF VASCULAR ACCESS DEVICE: Chronic | ICD-10-CM

## 2022-02-03 DIAGNOSIS — C50.919 MALIGNANT NEOPLASM OF UNSPECIFIED SITE OF UNSPECIFIED FEMALE BREAST: ICD-10-CM

## 2022-02-03 DIAGNOSIS — L73.9 FOLLICULAR DISORDER, UNSPECIFIED: Chronic | ICD-10-CM

## 2022-03-07 ENCOUNTER — APPOINTMENT (OUTPATIENT)
Dept: ENDOCRINOLOGY | Facility: CLINIC | Age: 57
End: 2022-03-07
Payer: COMMERCIAL

## 2022-03-07 VITALS
SYSTOLIC BLOOD PRESSURE: 124 MMHG | DIASTOLIC BLOOD PRESSURE: 72 MMHG | HEIGHT: 70 IN | BODY MASS INDEX: 36.22 KG/M2 | WEIGHT: 253 LBS

## 2022-03-07 DIAGNOSIS — R73.9 HYPERGLYCEMIA, UNSPECIFIED: ICD-10-CM

## 2022-03-07 LAB — GLUCOSE BLDC GLUCOMTR-MCNC: 108

## 2022-03-07 PROCEDURE — 82962 GLUCOSE BLOOD TEST: CPT

## 2022-03-07 PROCEDURE — 99214 OFFICE O/P EST MOD 30 MIN: CPT | Mod: 25

## 2022-03-08 ENCOUNTER — OUTPATIENT (OUTPATIENT)
Dept: OUTPATIENT SERVICES | Facility: HOSPITAL | Age: 57
LOS: 1 days | Discharge: HOME | End: 2022-03-08

## 2022-03-08 ENCOUNTER — APPOINTMENT (OUTPATIENT)
Dept: SURGERY | Facility: CLINIC | Age: 57
End: 2022-03-08
Payer: COMMERCIAL

## 2022-03-08 VITALS
SYSTOLIC BLOOD PRESSURE: 124 MMHG | DIASTOLIC BLOOD PRESSURE: 84 MMHG | HEART RATE: 86 BPM | BODY MASS INDEX: 35.5 KG/M2 | TEMPERATURE: 97.8 F | WEIGHT: 248 LBS | OXYGEN SATURATION: 97 % | HEIGHT: 70 IN

## 2022-03-08 DIAGNOSIS — Z90.13 ACQUIRED ABSENCE OF BILATERAL BREASTS AND NIPPLES: ICD-10-CM

## 2022-03-08 DIAGNOSIS — L73.9 FOLLICULAR DISORDER, UNSPECIFIED: Chronic | ICD-10-CM

## 2022-03-08 DIAGNOSIS — Z98.890 OTHER SPECIFIED POSTPROCEDURAL STATES: Chronic | ICD-10-CM

## 2022-03-08 DIAGNOSIS — Z45.2 ENCOUNTER FOR ADJUSTMENT AND MANAGEMENT OF VASCULAR ACCESS DEVICE: Chronic | ICD-10-CM

## 2022-03-08 DIAGNOSIS — C50.919 MALIGNANT NEOPLASM OF UNSPECIFIED SITE OF UNSPECIFIED FEMALE BREAST: ICD-10-CM

## 2022-03-08 PROCEDURE — 99214 OFFICE O/P EST MOD 30 MIN: CPT

## 2022-03-08 NOTE — HISTORY OF PRESENT ILLNESS
[FreeTextEntry1] : I had the pleasure of seeing ZOE CARTER  in the office today for a clinical exam secondary to diagnosed and treated left breast cancer.\par \par  She is a nael 56 yo nulliparous  female who has been in stable overall health.\par \par She palpated a left breast mass in the shower on labor day (2019) and underwent diagnostic mammogram/ultrasound 9/13/2019. A biopsy was performed demonstrating poorly differentiated IDC [id number] negative with DCIS measuring 1.5 cm.\par \par She completed neoadjuvant chemotherapy in February 2020 with a complete response.  She underwent left leonel  lumpectomy with slnb on 3/30/2020 and final pathology demonstrated no residual carcinoma.  She then underwent bilateral nipple sparring mastectomy with HARSHIL on 6/24/2020 ( due to covid HARSHIL were not being performed and she underwent lumpectomy due to surgical window s/p neoadjuvant chemotherapy.\par \par Imaging : \par [facility]  - Breast WAIC BI W CAD #\par MRI 9/20/19\par 1. Biopsy-proven invasive ductal carcinoma/ductal carcinoma in situ measuring up to 4.5 cm the upper central left breast, as above. Additional nonmass enhancement is noted extending from the superior aspect of the mass posteriorly by approximately 3 cm. Of note, enhancement on MRI in this location is significantly larger than reported on outside imagining reports. Wide excision is recommended. Alternatively, MR guided biopsy of additional nonmass enhancement can be performed if will alter management. The patient is advised to submit prior examinations for comparison at which time an addendum to the current be issued. \par 2. No evidence of suspicious enhancement in the contralateral right breast.\par \par  [facility]\par Bilateral diagnostic mammogram/ultrasound 9/13/2019\par Impression: New 1.5cm solid mass left breast 12:00 Imaging features are indeterminate. No suspicious finding in the right breast. BIRADS 4: Suspicious\par US: Left breast 12:00 7cm FN 1.5cm hypoechoic round mass with slightly angular margins corresponding to the new mass on mammography. Imaging features are indeterminate. BIRADS 4: Suspicious\par \par Pathology: Left breast 12:00 7cm FN\par Poorly differentiated IDC ER- MT- [id number] - with DCIS \par \par MIDSTAGING MRI BREAST:\par 1/3/202  MR breast IC BI CAD\par Impression:  Near complete resolution of enhancement/mass at the 12:00 position of the left breast.  Nonspecific 4 mm enhancing focus within the left nipple.  If it would alter management, consider surgical sampling for further evaluation.  This is not amenable to image guided biopsy.  BIRADS 4A low suspicion.\par \par 3/6/2020  MR Breast\par Impression:  Left breast malignancy status post neoadjuvant chemotherapy.  There has been resolution of previously seen enhancement in the let upper central breast.  Again noted is a stable 4 mm enhancing focus with the left nipple.  As previously discussed, this findings is not amendable to image guided biopsy.  BIRADS 6\par \par 3/30/2020\par 1.  Left axilla, sentinel lymph node #1:  One lymph node negative for metastatic carcinoma (0/1).\par 2.  Breast, left (lumpectomy):  Adenosis, small cyst, stromal fibrosis, and microcalcifications.  fibroadenoma, 0.2 cm.  Biopsy site changes.  Residual carcinoma is not identified.\par 3.  Breast, left anterior margin of lumpectomy.  Breast and fibroadipose tissue, negative for malignancy.\par 4.  Breast, left, lateral margin of lumpectomy:  Breast and fibroadipose tissue, negative for malignancy.\par 5.  Breast, left, superior margin of lumpectomy:  Breast and fibroadipose tissue, negative for malignancy.\par 6.  Breast, left, posterior/deep margin of lumpectomy:  Fibroadipose tissue, negative for malignancy.\par 7.  Breast, left medial margin of lumpectomy:  Breast and fibroadipose tissue, negative for malignancy.\par 8.  Breast left inferior margin of lumpectomy:  Fibroadipose tissue negative for malignancy.\par \par 6/24/2020 Final surgical pathology\par 1. Breast right nipple sparring simple mastectomy:  Focal fibrocystic changes and benign epithelial calcifications.\par 2.  Breast right retro-nipple, biopsy:  Benign breast tissue.\par 3.  Breast left nipple sparring simple mastectomy:  No malignancy identified.  Fibrocystic changes usual ductal hyperplasia, sclerosing adenosis, nodular adenosis and benign epithelial calcifications.  Microscopic intraductal papilloma.  Prior procedure site changes.\par 4.  Breast left retro nipple biopsy:  Benign breast tissue.\par 5.  Internal mammary lymph node, right biopsy:  unremarkable fibroadipose tissue present.\par 6.  Internal mammary lymph node, left biopsy:  One lymph node negative for metastatic carcinoma.\par \par 1/29/2021  US breast right\par Impression:  No sonographic suspicious findings right breast.  Findings consistent with post surgical changes and small post surgical seroma retroareolar right breast.  BIRADS 2 benign\par \par I reviewed clinical exam with her and she understands there are no dominant masses on exam today.  She is to return the office in 6 months and undergo annual MR breast in August 2022.  \par \par She understands and agrees with plan.  All questions answered.\par

## 2022-03-08 NOTE — PHYSICAL EXAM
[Normocephalic] : normocephalic [Atraumatic] : atraumatic [EOMI] : extra ocular movement intact [PERRL] : pupils equal, round and reactive to light [Sclera nonicteric] : sclera nonicteric [Supple] : supple [No Supraclavicular Adenopathy] : no supraclavicular adenopathy [Examined in the supine and seated position] : examined in the supine and seated position [No dominant masses] : no dominant masses in right breast  [No dominant masses] : no dominant masses left breast [No Nipple Retraction] : no left nipple retraction [No Nipple Discharge] : no left nipple discharge [No Axillary Lymphadenopathy] : no left axillary lymphadenopathy [No Edema] : no edema [No Rashes] : no rashes [No Ulceration] : no ulceration [de-identified] : flap health and well integrated  no evidence of hematoma seroma or infection\par  [de-identified] : flap health and well integrated  no evidence of hematoma seroma or infection\par

## 2022-03-08 NOTE — ASSESSMENT
[FreeTextEntry1] : 58 yo perimenopausal  s/p bilateral nipple sparring mastectomy and HARSHIL reconstruction on 6/24/2020. She is well healed and no residual carcinoma is seen on final pathology. Clinical exam is benign today. Recommendation for:\par 1.  Follow up in 6 months\par 2.  Followup with Dr. Daniels\par 3.  Followup with Dr. Dailey\par 4.  MR breast- Annual MR due in August 2022\par \par

## 2022-03-09 ENCOUNTER — RESULT REVIEW (OUTPATIENT)
Age: 57
End: 2022-03-09

## 2022-03-09 ENCOUNTER — APPOINTMENT (OUTPATIENT)
Dept: HEMATOLOGY ONCOLOGY | Facility: CLINIC | Age: 57
End: 2022-03-09
Payer: COMMERCIAL

## 2022-03-09 VITALS
SYSTOLIC BLOOD PRESSURE: 119 MMHG | BODY MASS INDEX: 35.5 KG/M2 | WEIGHT: 248 LBS | OXYGEN SATURATION: 98 % | HEART RATE: 87 BPM | DIASTOLIC BLOOD PRESSURE: 73 MMHG | HEIGHT: 70 IN

## 2022-03-09 LAB
ALBUMIN SERPL ELPH-MCNC: 4.5 G/DL
ALP BLD-CCNC: 101 U/L
ALT SERPL-CCNC: 44 U/L
ANION GAP SERPL CALC-SCNC: 12 MMOL/L
AST SERPL-CCNC: 30 U/L
BASOPHILS # BLD AUTO: 0.1 K/UL — SIGNIFICANT CHANGE UP (ref 0–0.2)
BASOPHILS NFR BLD AUTO: 1.4 % — SIGNIFICANT CHANGE UP (ref 0–2)
BILIRUB SERPL-MCNC: 0.4 MG/DL
BUN SERPL-MCNC: 10 MG/DL
CALCIUM SERPL-MCNC: 10 MG/DL
CHLORIDE SERPL-SCNC: 103 MMOL/L
CO2 SERPL-SCNC: 27 MMOL/L
CREAT SERPL-MCNC: 0.87 MG/DL
EGFR: 79 ML/MIN/1.73M2
EOSINOPHIL # BLD AUTO: 0.1 K/UL — SIGNIFICANT CHANGE UP (ref 0–0.5)
EOSINOPHIL NFR BLD AUTO: 1.5 % — SIGNIFICANT CHANGE UP (ref 0–6)
GLUCOSE SERPL-MCNC: 105 MG/DL
HCT VFR BLD CALC: 47.5 % — HIGH (ref 34.5–45)
HGB BLD-MCNC: 14.6 G/DL — SIGNIFICANT CHANGE UP (ref 11.5–15.5)
LYMPHOCYTES # BLD AUTO: 2.2 K/UL — SIGNIFICANT CHANGE UP (ref 1–3.3)
LYMPHOCYTES # BLD AUTO: 34.2 % — SIGNIFICANT CHANGE UP (ref 13–44)
MCHC RBC-ENTMCNC: 28.3 PG — SIGNIFICANT CHANGE UP (ref 27–34)
MCHC RBC-ENTMCNC: 30.8 G/DL — LOW (ref 32–36)
MCV RBC AUTO: 92 FL — SIGNIFICANT CHANGE UP (ref 80–100)
MONOCYTES # BLD AUTO: 0.6 K/UL — SIGNIFICANT CHANGE UP (ref 0–0.9)
MONOCYTES NFR BLD AUTO: 9.8 % — SIGNIFICANT CHANGE UP (ref 2–14)
NEUTROPHILS # BLD AUTO: 3.5 K/UL — SIGNIFICANT CHANGE UP (ref 1.8–7.4)
NEUTROPHILS NFR BLD AUTO: 53.1 % — SIGNIFICANT CHANGE UP (ref 43–77)
PLATELET # BLD AUTO: 378 K/UL — SIGNIFICANT CHANGE UP (ref 150–400)
POTASSIUM SERPL-SCNC: 4.7 MMOL/L
PROT SERPL-MCNC: 7.3 G/DL
RBC # BLD: 5.16 M/UL — SIGNIFICANT CHANGE UP (ref 3.8–5.2)
RBC # FLD: 13.2 % — SIGNIFICANT CHANGE UP (ref 10.3–14.5)
SODIUM SERPL-SCNC: 142 MMOL/L
WBC # BLD: 6.5 K/UL — SIGNIFICANT CHANGE UP (ref 3.8–10.5)
WBC # FLD AUTO: 6.5 K/UL — SIGNIFICANT CHANGE UP (ref 3.8–10.5)

## 2022-03-09 PROCEDURE — 99213 OFFICE O/P EST LOW 20 MIN: CPT

## 2022-03-09 NOTE — ASSESSMENT
[FreeTextEntry1] : \par \par Triple negative poorly differentiated invasive ductal breast cancer, in complete remission following initial neoadjuvant chemotherapy with complete pathologic response and subsequent bilateral nipple sparing mastectomies with HARSHIL reconstruction. \par \par  \par Plan\par Continue surveillance with 6-month follow-up. \par  \par

## 2022-03-09 NOTE — HISTORY OF PRESENT ILLNESS
[de-identified] : \par \par This 57-year-old woman initially presented with a left breast mass in September 2019. 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 Neoadjuvant dose dense Adriamycin/Cytoxan was followed by weekly Taxol, completed in February 2020. Left lumpectomy on March 30, 2020 showed complete pathologic response. On June 24, 2020 nipple sparing bilateral mastectomy was performed.\par She remains free of recurrent disease. Plastic surgery is following her HARSHIL flap reconstruction sites which have recently been revised\par . \par \par  [de-identified] : Nikki remains free of recurrence.\par She has been diagnosed with Hashimoto's thyroiditis, and started on Unithroid 25 mcg daily.\par With thyroid replacement her energy has improved and she has lost weight.

## 2022-03-09 NOTE — PHYSICAL EXAM
[Normal] : well developed, well nourished, in no acute distress [de-identified] : s/P bilateral mastectomy with reconstruction

## 2022-04-04 ENCOUNTER — RX RENEWAL (OUTPATIENT)
Age: 57
End: 2022-04-04

## 2022-04-04 ENCOUNTER — FORM ENCOUNTER (OUTPATIENT)
Age: 57
End: 2022-04-04

## 2022-04-05 ENCOUNTER — OUTPATIENT (OUTPATIENT)
Dept: OUTPATIENT SERVICES | Facility: HOSPITAL | Age: 57
LOS: 1 days | Discharge: HOME | End: 2022-04-05
Payer: COMMERCIAL

## 2022-04-05 DIAGNOSIS — Z45.2 ENCOUNTER FOR ADJUSTMENT AND MANAGEMENT OF VASCULAR ACCESS DEVICE: Chronic | ICD-10-CM

## 2022-04-05 DIAGNOSIS — M17.11 UNILATERAL PRIMARY OSTEOARTHRITIS, RIGHT KNEE: ICD-10-CM

## 2022-04-05 DIAGNOSIS — Z98.890 OTHER SPECIFIED POSTPROCEDURAL STATES: Chronic | ICD-10-CM

## 2022-04-05 DIAGNOSIS — Z01.812 ENCOUNTER FOR PREPROCEDURAL LABORATORY EXAMINATION: ICD-10-CM

## 2022-04-05 DIAGNOSIS — E07.9 DISORDER OF THYROID, UNSPECIFIED: ICD-10-CM

## 2022-04-05 DIAGNOSIS — R73.03 PREDIABETES: ICD-10-CM

## 2022-04-05 DIAGNOSIS — Z01.818 ENCOUNTER FOR OTHER PREPROCEDURAL EXAMINATION: ICD-10-CM

## 2022-04-05 DIAGNOSIS — L73.9 FOLLICULAR DISORDER, UNSPECIFIED: Chronic | ICD-10-CM

## 2022-04-05 DIAGNOSIS — Z87.898 PERSONAL HISTORY OF OTHER SPECIFIED CONDITIONS: ICD-10-CM

## 2022-04-05 LAB
A1C WITH ESTIMATED AVERAGE GLUCOSE RESULT: 5.7 % — HIGH (ref 4–5.6)
ALBUMIN SERPL ELPH-MCNC: 3.9 G/DL — SIGNIFICANT CHANGE UP (ref 3.3–5)
ALP SERPL-CCNC: 105 U/L — SIGNIFICANT CHANGE UP (ref 40–120)
ALT FLD-CCNC: 73 U/L — SIGNIFICANT CHANGE UP (ref 12–78)
ANION GAP SERPL CALC-SCNC: 5 MMOL/L — SIGNIFICANT CHANGE UP (ref 5–17)
APTT BLD: 33.1 SEC — SIGNIFICANT CHANGE UP (ref 27.5–35.5)
AST SERPL-CCNC: 43 U/L — HIGH (ref 15–37)
BASOPHILS # BLD AUTO: 0.04 K/UL — SIGNIFICANT CHANGE UP (ref 0–0.2)
BASOPHILS NFR BLD AUTO: 0.6 % — SIGNIFICANT CHANGE UP (ref 0–2)
BILIRUB SERPL-MCNC: 0.7 MG/DL — SIGNIFICANT CHANGE UP (ref 0.2–1.2)
BLD GP AB SCN SERPL QL: SIGNIFICANT CHANGE UP
BUN SERPL-MCNC: 11 MG/DL — SIGNIFICANT CHANGE UP (ref 7–23)
CALCIUM SERPL-MCNC: 9.9 MG/DL — SIGNIFICANT CHANGE UP (ref 8.5–10.1)
CHLORIDE SERPL-SCNC: 109 MMOL/L — HIGH (ref 96–108)
CO2 SERPL-SCNC: 29 MMOL/L — SIGNIFICANT CHANGE UP (ref 22–31)
CREAT SERPL-MCNC: 0.87 MG/DL — SIGNIFICANT CHANGE UP (ref 0.5–1.3)
EGFR: 79 ML/MIN/1.73M2 — SIGNIFICANT CHANGE UP
EOSINOPHIL # BLD AUTO: 0.14 K/UL — SIGNIFICANT CHANGE UP (ref 0–0.5)
EOSINOPHIL NFR BLD AUTO: 2.3 % — SIGNIFICANT CHANGE UP (ref 0–6)
ESTIMATED AVERAGE GLUCOSE: 117 MG/DL — HIGH (ref 68–114)
GLUCOSE SERPL-MCNC: 98 MG/DL — SIGNIFICANT CHANGE UP (ref 70–99)
HCT VFR BLD CALC: 42.4 % — SIGNIFICANT CHANGE UP (ref 34.5–45)
HGB BLD-MCNC: 13.8 G/DL — SIGNIFICANT CHANGE UP (ref 11.5–15.5)
IMM GRANULOCYTES NFR BLD AUTO: 0 % — SIGNIFICANT CHANGE UP (ref 0–1.5)
INR BLD: 0.98 RATIO — SIGNIFICANT CHANGE UP (ref 0.88–1.16)
LYMPHOCYTES # BLD AUTO: 2.3 K/UL — SIGNIFICANT CHANGE UP (ref 1–3.3)
LYMPHOCYTES # BLD AUTO: 37.3 % — SIGNIFICANT CHANGE UP (ref 13–44)
MCHC RBC-ENTMCNC: 28.2 PG — SIGNIFICANT CHANGE UP (ref 27–34)
MCHC RBC-ENTMCNC: 32.5 G/DL — SIGNIFICANT CHANGE UP (ref 32–36)
MCV RBC AUTO: 86.5 FL — SIGNIFICANT CHANGE UP (ref 80–100)
MONOCYTES # BLD AUTO: 0.52 K/UL — SIGNIFICANT CHANGE UP (ref 0–0.9)
MONOCYTES NFR BLD AUTO: 8.4 % — SIGNIFICANT CHANGE UP (ref 2–14)
NEUTROPHILS # BLD AUTO: 3.17 K/UL — SIGNIFICANT CHANGE UP (ref 1.8–7.4)
NEUTROPHILS NFR BLD AUTO: 51.4 % — SIGNIFICANT CHANGE UP (ref 43–77)
NRBC # BLD: 0 /100 WBCS — SIGNIFICANT CHANGE UP (ref 0–0)
PLATELET # BLD AUTO: 308 K/UL — SIGNIFICANT CHANGE UP (ref 150–400)
POTASSIUM SERPL-MCNC: 3.9 MMOL/L — SIGNIFICANT CHANGE UP (ref 3.5–5.3)
POTASSIUM SERPL-SCNC: 3.9 MMOL/L — SIGNIFICANT CHANGE UP (ref 3.5–5.3)
PROT SERPL-MCNC: 7.9 GM/DL — SIGNIFICANT CHANGE UP (ref 6–8.3)
PROTHROM AB SERPL-ACNC: 11.7 SEC — SIGNIFICANT CHANGE UP (ref 10.5–13.4)
RBC # BLD: 4.9 M/UL — SIGNIFICANT CHANGE UP (ref 3.8–5.2)
RBC # FLD: 14 % — SIGNIFICANT CHANGE UP (ref 10.3–14.5)
SODIUM SERPL-SCNC: 143 MMOL/L — SIGNIFICANT CHANGE UP (ref 135–145)
WBC # BLD: 6.17 K/UL — SIGNIFICANT CHANGE UP (ref 3.8–10.5)
WBC # FLD AUTO: 6.17 K/UL — SIGNIFICANT CHANGE UP (ref 3.8–10.5)

## 2022-04-05 PROCEDURE — 93010 ELECTROCARDIOGRAM REPORT: CPT

## 2022-04-05 RX ORDER — ONDANSETRON 8 MG/1
1 TABLET, FILM COATED ORAL
Qty: 0 | Refills: 0 | DISCHARGE

## 2022-04-05 RX ORDER — KETOROLAC TROMETHAMINE 30 MG/ML
1 SYRINGE (ML) INJECTION
Qty: 0 | Refills: 0 | DISCHARGE

## 2022-04-05 NOTE — OCCUPATIONAL THERAPY INITIAL EVALUATION ADULT - RANGE OF MOTION EXAMINATION, UPPER EXTREMITY
Right UE Active Assistive ROM was WFL  (within functional limits)/bilateral UE Passive ROM was WNL (within normal limits)

## 2022-04-05 NOTE — H&P PST ADULT - NSICDXFAMILYHX_GEN_ALL_CORE_FT
FAMILY HISTORY:  Father  Still living? No  Family history of acute myocardial infarction, Age at diagnosis: 61-70  Family history of hypertension, Age at diagnosis: Age Unknown  Family history of pancreatic cancer, Age at diagnosis: Age Unknown    Mother  Still living? No  Family history of gastric cancer, Age at diagnosis: 51-60

## 2022-04-05 NOTE — OCCUPATIONAL THERAPY INITIAL EVALUATION ADULT - LIVES WITH, PROFILE
in a private house with 3 steps to enter equipped with bilateral hand rails that cannot be reached simultaneously. Once inside, pt has to negotiate a flight of stairs , with 13 steps, left ascending handrail to access the bedroom and bathroom.  The bathroom has a walk in shower, grab bars, fixed / retractable and comfort height with adequate space to fit a commode over it./spouse

## 2022-04-05 NOTE — PHYSICAL THERAPY INITIAL EVALUATION ADULT - GENERAL OBSERVATIONS, REHAB EVAL
Patient was seen seated  in chair in PT/OT preoperative assessment room with no apparent distress. Height:  5'11"     ; weight :  250   lbs.

## 2022-04-05 NOTE — PHYSICAL THERAPY INITIAL EVALUATION ADULT - ADDITIONAL COMMENTS
There are 3 steps, c far leighton rails,  at the entry of the house and 13 steps, c L rail up, to negotiate at home.  Pt has a walk in shower c retractable shower head,  no grab bar, and comfort toilet seat in BR. 3-1 commode will fit in BR. Average pain level at rest is 7/10. Pain increases to 9/10 c prolonged standing, ambulation, stairs negotiation, and bad weather. Pt takes Tramadol for pain management. Pt has no experience of adverse effects with pain medication. Pt is not on out-pt physical therapy at present. There is no h/o fall but R knee go sometimes. Pt wears glasses for reading and distance and no need for hearing aid. Pt is R handed and drives.

## 2022-04-05 NOTE — OCCUPATIONAL THERAPY INITIAL EVALUATION ADULT - RANGE OF MOTION EXAMINATION, LOWER EXTREMITY
ROM is limited in right knee due to pain/bilateral LE Active ROM was WFL  (within functional limits)/bilateral LE Passive ROM was WFL  (within functional limits)

## 2022-04-05 NOTE — H&P PST ADULT - NSICDXPASTSURGICALHX_GEN_ALL_CORE_FT
PAST SURGICAL HISTORY:  Disorder of hair follicle bilateral axilla 1996    Encounter for central line care [facility] - 10/2019    H/O arthroscopy of right knee 2015    History of bladder surgery sling 2018    History of lumpectomy of left breast March 20020

## 2022-04-05 NOTE — OCCUPATIONAL THERAPY INITIAL EVALUATION ADULT - PERTINENT HX OF CURRENT PROBLEM, REHAB EVAL
Pt is a 56 y/o female slated for elective surgery for right TKR with MD Frederick on 4/18/22, due to OA, chronic pain and DJD. Pt reported buckling in her right knee, but denied any falls in the past 3-6 months

## 2022-04-05 NOTE — OCCUPATIONAL THERAPY INITIAL EVALUATION ADULT - GENERAL OBSERVATIONS, REHAB EVAL
Chart reviewed. Patient encountered seated in chair in rehab preop room in [facility]. Patient underwent occupational therapy pre-operative consultation to determine current functional ADL limitations in order to provide the right equipment for patient to perform functional ADL post operation.

## 2022-04-05 NOTE — H&P PST ADULT - PROBLEM SELECTOR PLAN 1
Right total knee replacement  labs - cbc,pt/ptt,bmp,t&s,nose cx,ekg  M/C required  preop 3 day hibiclens instruction reviewed and given .instructed on if  nose cx positive use mupuricin 5 days and checklist given  take routine meds DOS with sips of water. avoid NSAID and OTC supplements. verbalized understanding  information on proper nutrition , increase protein and better food choices provided in packet   Ensure clear held 2/2 prediabetes  Anesthesiologist to review PST labs, EKG, required clearances and optimization for surgery.

## 2022-04-05 NOTE — H&P PST ADULT - ASSESSMENT
57F PMHx pre-DM, thyroid disease, breast CA (s/p chemo, Sx 2020), hld, pituitary tumor c/o right knee pain 2/2 unilateral primary osteoarthritis here for PST for scheduled Right total knee replacement  CAPRINI SCORE    AGE RELATED RISK FACTORS                                                       MOBILITY RELATED FACTORS  [x ] Age 41-60 years                                            (1 Point)                  [ ] Bed rest                                                        (1 Point)  [ ] Age: 61-74 years                                           (2 Points)                [ ] Plaster cast                                                   (2 Points)  [ ] Age= 75 years                                              (3 Points)                 [ ] Bed bound for more than 72 hours                   (2 Points)    DISEASE RELATED RISK FACTORS                                               GENDER SPECIFIC FACTORS  [ ] Edema in the lower extremities                       (1 Point)                  [ ] Pregnancy                                                     (1 Point)  [ ] Varicose veins                                               (1 Point)                  [ ] Post-partum < 6 weeks                                   (1 Point)             [x ] BMI > 25 Kg/m2                                            (1 Point)                  [ ] Hormonal therapy  or oral contraception            (1 Point)                 [ ] Sepsis (in the previous month)                        (1 Point)                  [ ] History of pregnancy complications  [ ] Pneumonia or serious lung disease                                               [ ] Unexplained or recurrent                       (1 Point)           (in the previous month)                               (1 Point)  [ ] Abnormal pulmonary function test                     (1 Point)                 SURGERY RELATED RISK FACTORS  [ ] Acute myocardial infarction                              (1 Point)                 [ ]  Section                                            (1 Point)  [ ] Congestive heart failure (in the previous month)  (1 Point)                 [ ] Minor surgery                                                 (1 Point)   [ ] Inflammatory bowel disease                             (1 Point)                 [ ] Arthroscopic surgery                                        (2 Points)  [ ] Central venous access                                    (2 Points)                [ ] General surgery lasting more than 45 minutes   (2 Points)       [ ] Stroke (in the previous month)                          (5 Points)               [x ] Elective arthroplasty                                        (5 Points)                                                                                                                                               HEMATOLOGY RELATED FACTORS                                                 TRAUMA RELATED RISK FACTORS  [ ] Prior episodes of VTE                                     (3 Points)                 [ ] Fracture of the hip, pelvis, or leg                       (5 Points)  [ ] Positive family history for VTE                         (3 Points)                 [ ] Acute spinal cord injury (in the previous month)  (5 Points)  [ ] Prothrombin 97893 A                                      (3 Points)                 [ ] Paralysis  (less than 1 month)                          (5 Points)  [ ] Factor V Leiden                                             (3 Points)                 [ ] Multiple Trauma within 1 month                         (5 Points)  [ ] Lupus anticoagulants                                     (3 Points)                                                           [ ] Anticardiolipin antibodies                                (3 Points)                                                       [ ] High homocysteine in the blood                      (3 Points)                                             [ ] Other congenital or acquired thrombophilia       (3 Points)                                                [ ] Heparin induced thrombocytopenia                  (3 Points)                                          Total Score [   7       ]

## 2022-04-05 NOTE — PHYSICAL THERAPY INITIAL EVALUATION ADULT - REFERRAL TO ANOTHER SERVICE NEEDED, PT EVAL
Anticoagulation Summary  As of 2019    INR goal:   2.0-3.0   TTR:   58.9 % (4.3 y)   INR used for dosin.50 (2019)   Warfarin maintenance plan:   7.5 mg (5 mg x 1.5) every Wed, Sat; 5 mg (5 mg x 1) all other days   Weekly warfarin total:   40 mg   Plan last modified:   Karen Ignacio, PharmD (3/29/2019)   Next INR check:   2019   Priority:   Maintenance   Target end date:   Indefinite    Indications    Deep vein thrombosis [453.40] [I82.409]             Anticoagulation Episode Summary     INR check location:   Home Draw    Preferred lab:       Send INR reminders to:       Comments:   Eloy MYRICK      Anticoagulation Care Providers     Provider Role Specialty Phone number    Bruna Ureña M.D. Referring Cardiology [phone]    Babita Sarmiento Responsible      [facility] Responsible  [phone]        Anticoagulation Patient Findings    Left voicemail message to report a therapeutic INR of 2.5.    Pt to continue with current warfarin dosing regimen. Requested pt contact the clinic for any s/s of unusual bleeding, bruising, clotting or any changes to diet or medication.    FU INR in 2 week(s).    Karen Ignacio, PharmD        
occupational therapy/orthopedics/social work

## 2022-04-05 NOTE — OCCUPATIONAL THERAPY INITIAL EVALUATION ADULT - ADDITIONAL COMMENTS
At this time is functioning in her roles, self sufficient, driving & ambulating independently in the community without any assistive devices. 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 Pt c/o 7/10 pain in her right knee. The pain is exacerbated, by walking, prolonged standing, negotiating steps and is relieved with Tramadol. Pt scores 90% of patient specific scale .

## 2022-04-05 NOTE — PHYSICAL THERAPY INITIAL EVALUATION ADULT - PERTINENT HX OF CURRENT PROBLEM, REHAB EVAL
R knee OA c chronic pain and  limiting functional mobility. Pt is scheduled for R knee elective total joint replacement on 4/18/22  by  Dr. Frederick.

## 2022-04-05 NOTE — H&P PST ADULT - OPHTHALMOLOGIC
no loss of consciousness, no gait abnormality, no headache, no sensory deficits, and no weakness.
negative

## 2022-04-05 NOTE — H&P PST ADULT - HISTORY OF PRESENT ILLNESS
..........54 y/o female with PMHx of diabetes presents PST and COVID 19 testing.  Patient reports dx of Left breast cancer dx September 2019.  Pt s/p chemo which ended February 2020.  Pt is now scheduled for Bilateral Mastectomy with Anali Flap Breast Reconstruction, Intravenous Angiography with Dr Roque on 06/24/2020.  57F PMHx pre-DM, thyroid disease, breast CA (s/p chemo, Sx 2020), hld, pituitary tumor c/o right knee pain 2/2 unilateral primary osteoarthritis here for PST for scheduled Right total knee replacement  This patient denies any fever, cough, sob, flu like symptoms or travel outside of the [place] in the past 30 days

## 2022-04-05 NOTE — H&P PST ADULT - NSICDXPASTMEDICALHX_GEN_ALL_CORE_FT
PAST MEDICAL HISTORY:  Arthritis bilateral knees    Breast cancer left    History of pituitary tumor small, no current treatment    History of prolapse of bladder sling    History of uterine fibroid     PCOS (polycystic ovarian syndrome)     Prediabetes

## 2022-04-06 LAB
MRSA PCR RESULT.: SIGNIFICANT CHANGE UP
S AUREUS DNA NOSE QL NAA+PROBE: SIGNIFICANT CHANGE UP
VIT D25+D1,25 OH+D1,25 PNL SERPL-MCNC: 60.2 PG/ML — SIGNIFICANT CHANGE UP (ref 19.9–79.3)

## 2022-04-11 PROBLEM — Z11.59 SCREENING FOR VIRAL DISEASE: Status: ACTIVE | Noted: 2021-12-22

## 2022-04-12 ENCOUNTER — TRANSCRIPTION ENCOUNTER (OUTPATIENT)
Age: 57
End: 2022-04-12

## 2022-04-13 ENCOUNTER — APPOINTMENT (OUTPATIENT)
Dept: INTERNAL MEDICINE | Facility: CLINIC | Age: 57
End: 2022-04-13
Payer: COMMERCIAL

## 2022-04-13 VITALS
HEART RATE: 72 BPM | DIASTOLIC BLOOD PRESSURE: 78 MMHG | BODY MASS INDEX: 35.87 KG/M2 | SYSTOLIC BLOOD PRESSURE: 110 MMHG | RESPIRATION RATE: 12 BRPM | WEIGHT: 250 LBS

## 2022-04-13 DIAGNOSIS — Z85.3 PERSONAL HISTORY OF MALIGNANT NEOPLASM OF BREAST: ICD-10-CM

## 2022-04-13 DIAGNOSIS — Z01.818 ENCOUNTER FOR OTHER PREPROCEDURAL EXAMINATION: ICD-10-CM

## 2022-04-13 DIAGNOSIS — M17.0 BILATERAL PRIMARY OSTEOARTHRITIS OF KNEE: ICD-10-CM

## 2022-04-13 PROCEDURE — 99214 OFFICE O/P EST MOD 30 MIN: CPT

## 2022-04-13 NOTE — ASSESSMENT
[Patient Optimized for Surgery] : Patient optimized for surgery [No Further Testing Recommended] : no further testing recommended [FreeTextEntry4] : Patient is medically stable for planned procedure.  Patient has well controlled medical issues [FreeTextEntry7] : no nsaids

## 2022-04-13 NOTE — HISTORY OF PRESENT ILLNESS
[No Pertinent Cardiac History] : no history of aortic stenosis, atrial fibrillation, coronary artery disease, recent myocardial infarction, or implantable device/pacemaker [Aortic Stenosis] : no aortic stenosis [Atrial Fibrillation] : no atrial fibrillation [Coronary Artery Disease] : no coronary artery disease [Recent Myocardial Infarction] : no recent myocardial infarction [Implantable Device/Pacemaker] : no implantable device/pacemaker [No Pertinent Pulmonary History] : no history of asthma, COPD, sleep apnea, or smoking [Asthma] : no asthma [COPD] : no COPD [Sleep Apnea] : no sleep apnea [Smoker] : not a smoker [No Adverse Anesthesia Reaction] : no adverse anesthesia reaction in self or family member [Family Member] : no family member with adverse anesthesia reaction/sudden death [Self] : no previous adverse anesthesia reaction [Chronic Anticoagulation] : no chronic anticoagulation [Chronic Kidney Disease] : no chronic kidney disease [Diabetes] : diabetes [(Patient denies any chest pain, claudication, dyspnea on exertion, orthopnea, palpitations or syncope)] : Patient denies any chest pain, claudication, dyspnea on exertion, orthopnea, palpitations or syncope [Moderate (4-6 METs)] : Moderate (4-6 METs) [FreeTextEntry1] : RIght Knee Replacement [FreeTextEntry2] : 4/18/2022 [FreeTextEntry3] : Dr. Frederick [FreeTextEntry4] : Patient with history of predm, history of breast cancer, with degenerative right knee. She will undergo right knee replacement.

## 2022-04-13 NOTE — RESULTS/DATA
[] : results reviewed [de-identified] : normal [de-identified] : normal [de-identified] : normal [de-identified] : nsr no acute changes

## 2022-04-17 ENCOUNTER — TRANSCRIPTION ENCOUNTER (OUTPATIENT)
Age: 57
End: 2022-04-17

## 2022-04-18 ENCOUNTER — TRANSCRIPTION ENCOUNTER (OUTPATIENT)
Age: 57
End: 2022-04-18

## 2022-04-18 ENCOUNTER — RESULT REVIEW (OUTPATIENT)
Age: 57
End: 2022-04-18

## 2022-04-18 ENCOUNTER — APPOINTMENT (OUTPATIENT)
Dept: ORTHOPEDIC SURGERY | Facility: HOSPITAL | Age: 57
End: 2022-04-18

## 2022-04-18 ENCOUNTER — OUTPATIENT (OUTPATIENT)
Dept: OUTPATIENT SERVICES | Facility: HOSPITAL | Age: 57
LOS: 1 days | Discharge: HOME | End: 2022-04-18
Payer: COMMERCIAL

## 2022-04-18 DIAGNOSIS — Z45.2 ENCOUNTER FOR ADJUSTMENT AND MANAGEMENT OF VASCULAR ACCESS DEVICE: Chronic | ICD-10-CM

## 2022-04-18 DIAGNOSIS — Z98.890 OTHER SPECIFIED POSTPROCEDURAL STATES: Chronic | ICD-10-CM

## 2022-04-18 DIAGNOSIS — L73.9 FOLLICULAR DISORDER, UNSPECIFIED: Chronic | ICD-10-CM

## 2022-04-18 PROCEDURE — 27447 TOTAL KNEE ARTHROPLASTY: CPT | Mod: RT

## 2022-04-18 RX ORDER — METFORMIN HYDROCHLORIDE 850 MG/1
1 TABLET ORAL
Qty: 0 | Refills: 0 | DISCHARGE

## 2022-04-18 RX ORDER — MONTELUKAST 4 MG/1
1 TABLET, CHEWABLE ORAL
Qty: 0 | Refills: 0 | DISCHARGE

## 2022-04-18 RX ORDER — ATORVASTATIN CALCIUM 80 MG/1
1 TABLET, FILM COATED ORAL
Qty: 0 | Refills: 0 | DISCHARGE

## 2022-04-18 RX ORDER — LEVOTHYROXINE SODIUM 125 MCG
1 TABLET ORAL
Qty: 0 | Refills: 0 | DISCHARGE

## 2022-04-18 RX ORDER — CETIRIZINE HYDROCHLORIDE 10 MG/1
1 TABLET ORAL
Qty: 0 | Refills: 0 | DISCHARGE

## 2022-04-28 DIAGNOSIS — R73.03 PREDIABETES: ICD-10-CM

## 2022-04-28 DIAGNOSIS — Z87.891 PERSONAL HISTORY OF NICOTINE DEPENDENCE: ICD-10-CM

## 2022-04-28 DIAGNOSIS — Z79.84 LONG TERM (CURRENT) USE OF ORAL HYPOGLYCEMIC DRUGS: ICD-10-CM

## 2022-04-28 DIAGNOSIS — M17.11 UNILATERAL PRIMARY OSTEOARTHRITIS, RIGHT KNEE: ICD-10-CM

## 2022-04-28 DIAGNOSIS — E28.2 POLYCYSTIC OVARIAN SYNDROME: ICD-10-CM

## 2022-04-28 DIAGNOSIS — Z92.21 PERSONAL HISTORY OF ANTINEOPLASTIC CHEMOTHERAPY: ICD-10-CM

## 2022-04-28 DIAGNOSIS — M17.0 BILATERAL PRIMARY OSTEOARTHRITIS OF KNEE: ICD-10-CM

## 2022-04-28 DIAGNOSIS — Z85.3 PERSONAL HISTORY OF MALIGNANT NEOPLASM OF BREAST: ICD-10-CM

## 2022-05-03 ENCOUNTER — APPOINTMENT (OUTPATIENT)
Dept: ORTHOPEDIC SURGERY | Facility: CLINIC | Age: 57
End: 2022-05-03
Payer: COMMERCIAL

## 2022-05-03 VITALS — HEIGHT: 70 IN | WEIGHT: 245 LBS | BODY MASS INDEX: 35.07 KG/M2

## 2022-05-03 PROCEDURE — 73562 X-RAY EXAM OF KNEE 3: CPT | Mod: RT

## 2022-05-03 PROCEDURE — 99024 POSTOP FOLLOW-UP VISIT: CPT

## 2022-05-03 NOTE — IMAGING
[de-identified] : Constitutional: The patient appears well developed, well nourished. Examination of patients ability to communicate functionally was normal. \par \par Skin: Skin of the head and face is normal without rashes, lesions or ulcers. \par \par Cardiovascular: Peripheral Vascular System is normal. \par \par Neurologic: Alert and oriented to time, place and person. No evidence of mood disorder, calm affect. \par \par Right Knee: Inspection of the knee is as follows: no effusion, erythema, ecchymosis, scars or deformities. Palpation of the knee is as follows: lateral facet of patella tenderness, patellar compression tenderness and crepitus about the patella. Knee Range of Motion is as follows: full flexion and extension without pain (0-140). Strength examination of the knee is as follows: Quadriceps strength is 4/5 Hamstring strength is 4/5 Ligament Stability and Special Test ligamentously stable. \par \par Right Hip/Thigh: Inspection of the hip/thigh is as follows: Inspection shows no swelling and no ecchymosis. Range of motion of the hip is as follows: full ROM  [All Views] : anteroposterior, lateral, skyline, and anteroposterior standing [Components well fixed, in good position] : Components well fixed, in good position

## 2022-05-03 NOTE — HISTORY OF PRESENT ILLNESS
[5] : 5 [7] : 7 [Meds] : meds [Lying in bed] : lying in bed [de-identified] : pt is here for the first post op follow up of the right knee. pt states pain level has worsened since last visit. surgery was April 18,2022. doing well [FreeTextEntry1] : right knee  [de-identified] : home exercises

## 2022-05-03 NOTE — ASSESSMENT
[FreeTextEntry1] : S/P RT TKA 4/18/22: NO F/C/S. DOING WELL. XRAYS REVIEWED WITH COMPONENTS WELL FIXED. NO SIGNS OF INFECTION. GOOD LIGAMENT BALANCE ON EXAM.  DISCUSSED THE IMPORTANCE OF ELEVATION TO REDUCE SWELLING. PROPER ELEVATION TECHNIQUES DISCUSSED. ABX AND PRECAUTIONS REVIEWED. PT RX. QUESTIONS ANSWERED. we discussed travel as well\par FOlLOW UP IN 3 MONTHS WITH XRAYS

## 2022-07-06 PROBLEM — E78.5 HYPERLIPIDEMIA, UNSPECIFIED: Chronic | Status: ACTIVE | Noted: 2022-04-18

## 2022-07-06 PROBLEM — E06.3 AUTOIMMUNE THYROIDITIS: Chronic | Status: ACTIVE | Noted: 2022-04-18

## 2022-07-11 ENCOUNTER — LABORATORY RESULT (OUTPATIENT)
Age: 57
End: 2022-07-11

## 2022-07-12 LAB
25(OH)D3 SERPL-MCNC: 34.9 NG/ML
ALBUMIN SERPL ELPH-MCNC: 4.7 G/DL
ALP BLD-CCNC: 84 U/L
ALT SERPL-CCNC: 25 U/L
ANION GAP SERPL CALC-SCNC: 11 MMOL/L
AST SERPL-CCNC: 27 U/L
BASOPHILS # BLD AUTO: 0.04 K/UL
BASOPHILS NFR BLD AUTO: 0.8 %
BILIRUB SERPL-MCNC: 0.4 MG/DL
BUN SERPL-MCNC: 9 MG/DL
CALCIUM SERPL-MCNC: 10 MG/DL
CHLORIDE SERPL-SCNC: 103 MMOL/L
CHOLEST SERPL-MCNC: 191 MG/DL
CO2 SERPL-SCNC: 26 MMOL/L
CREAT SERPL-MCNC: 0.79 MG/DL
CREAT SPEC-SCNC: 196 MG/DL
EGFR: 88 ML/MIN/1.73M2
EOSINOPHIL # BLD AUTO: 0.15 K/UL
EOSINOPHIL NFR BLD AUTO: 2.9 %
ESTIMATED AVERAGE GLUCOSE: 120 MG/DL
GLUCOSE SERPL-MCNC: 95 MG/DL
HBA1C MFR BLD HPLC: 5.8 %
HCT VFR BLD CALC: 41.6 %
HDLC SERPL-MCNC: 94 MG/DL
HGB BLD-MCNC: 13.1 G/DL
IMM GRANULOCYTES NFR BLD AUTO: 0.4 %
LDLC SERPL CALC-MCNC: 68 MG/DL
LYMPHOCYTES # BLD AUTO: 1.93 K/UL
LYMPHOCYTES NFR BLD AUTO: 37.2 %
MAN DIFF?: NORMAL
MCHC RBC-ENTMCNC: 27.2 PG
MCHC RBC-ENTMCNC: 31.5 GM/DL
MCV RBC AUTO: 86.5 FL
MICROALBUMIN 24H UR DL<=1MG/L-MCNC: <1.2 MG/DL
MICROALBUMIN/CREAT 24H UR-RTO: NORMAL MG/G
MONOCYTES # BLD AUTO: 0.5 K/UL
MONOCYTES NFR BLD AUTO: 9.6 %
NEUTROPHILS # BLD AUTO: 2.55 K/UL
NEUTROPHILS NFR BLD AUTO: 49.1 %
NONHDLC SERPL-MCNC: 97 MG/DL
PLATELET # BLD AUTO: 347 K/UL
POTASSIUM SERPL-SCNC: 4.4 MMOL/L
PROT SERPL-MCNC: 7.2 G/DL
RBC # BLD: 4.81 M/UL
RBC # FLD: 14.4 %
SODIUM SERPL-SCNC: 140 MMOL/L
T3FREE SERPL-MCNC: 3.43 PG/ML
T4 FREE SERPL-MCNC: 1.5 NG/DL
TRIGL SERPL-MCNC: 148 MG/DL
TSH SERPL-ACNC: 1.18 UIU/ML
VIT B12 SERPL-MCNC: 371 PG/ML
WBC # FLD AUTO: 5.19 K/UL

## 2022-07-13 ENCOUNTER — APPOINTMENT (OUTPATIENT)
Dept: ENDOCRINOLOGY | Facility: CLINIC | Age: 57
End: 2022-07-13

## 2022-07-13 VITALS
BODY MASS INDEX: 33.79 KG/M2 | DIASTOLIC BLOOD PRESSURE: 78 MMHG | HEIGHT: 70 IN | WEIGHT: 236 LBS | SYSTOLIC BLOOD PRESSURE: 110 MMHG

## 2022-07-13 PROCEDURE — 99214 OFFICE O/P EST MOD 30 MIN: CPT

## 2022-07-13 RX ORDER — ERGOCALCIFEROL (VITAMIN D2) 50 MCG
50 MCG CAPSULE ORAL
Refills: 0 | Status: ACTIVE | COMMUNITY

## 2022-07-13 RX ORDER — TRAMADOL HYDROCHLORIDE 50 MG/1
50 TABLET, COATED ORAL EVERY 6 HOURS
Qty: 40 | Refills: 0 | Status: DISCONTINUED | COMMUNITY
Start: 2022-05-03 | End: 2022-07-13

## 2022-07-13 RX ORDER — OXYCODONE 5 MG/1
5 TABLET ORAL
Qty: 40 | Refills: 0 | Status: DISCONTINUED | COMMUNITY
Start: 2022-04-19 | End: 2022-07-13

## 2022-07-13 NOTE — REVIEW OF SYSTEMS
[Recent Weight Loss (___ Lbs)] : recent weight loss: [unfilled] lbs [Pain/Numbness of Digits] : pain/numbness of digits [Fatigue] : no fatigue [Blurred Vision] : no blurred vision [Dysphagia] : no dysphagia [Neck Pain] : no neck pain [Chest Pain] : no chest pain [Palpitations] : no palpitations [Shortness Of Breath] : no shortness of breath [Nausea] : no nausea [Constipation] : no constipation [Abdominal Pain] : no abdominal pain [Vomiting] : no vomiting [Diarrhea] : no diarrhea [Polyuria] : no polyuria [Tremors] : no tremors [Depression] : no depression [Anxiety] : no anxiety [Polydipsia] : no polydipsia

## 2022-07-13 NOTE — HISTORY OF PRESENT ILLNESS
[FreeTextEntry1] : Initial visit: H/o Pre-Diabetes, Hashimoto's and prolactinoma. Pt was on Cabergoline in past but was tapered off about 3 years ago after going through menopause. Breast Cancer diagnosed 9/2019, double mastectomy with reconstruction in 6/2020. In remission, CT-Scan 8/30/21, no lesions found. Last Period 12/2018.\par Denies headaches, blurry vision, \par \par Interval history: R TKR 4/18/22- healing well\par Retired in February \par Is more active- walking, bike, elliptical\par \par Quality: Pre-Diabetes \par Duration: 4 years \par Modifying Factors: Metformin\par Family Hx: Paternal Aunts \par \par No SMBG.\par \par Current Regimen:\par Metformin 500 mg BID\par \par Eye Exam: June 2022, all ok. FH of glaucoma\par Foot Exam: occasional neuropathy after chemo, has improved\par Kidney Disease: none \par Heart Disease:none\par \par Weight: lost 15 pounds in 3 months.\par Diet: 3 meals daily, balanced diet. Paying more attention to meals, planning better meals now that she is retired.\par Exercise: much more active since shelter. Biking, walking, elliptical \par Smoking: ex-smoker \par ================================\par Hashimoto's (Lab 9/2021: Tg ab neg, TPO ab 246.0) \par Onset: fatigue\par Duration:9/2021\par Modifying factors: better with meds\par \par Current Medications: Unithroid 25 mcg QD, takes appropriately\par Thyroid U/S: 9/2021, normal thyroid u/s\par Since starting Unithroid, fatigue has improved \par ================================\par Hyperprolactinemia \par MacroProlactin 9.02\par Prolactin 11.7\par On Cabergoline in past but was tapered off about 3 years ago after going through menopause

## 2022-07-13 NOTE — ASSESSMENT
[FreeTextEntry1] : 57 year old female, PMH significant for breast cancer s/p double mastectomy with reconstruction in 6/2020, now in remission, also with prediabetes, hypothyroidism, and history of hyperprolactinemia. \par \par 1. Prediabetes- stable, A1c 5.8%.\par -Continue Metformin 500 mg BID.\par -Continue to work on lifestyle modifications- diet, exercise, and weight loss- to prevent progression to T2DM.\par -Repeat A1c in 6 months.\par \par 2. Hashimoto's hypothyroidism- TPO ab 246.0, Tg ab neg\par -Euthyroid on replacement T4.\par -Continue Unithroid 25 mcg daily.\par \par 3. Hyperprolactinemia- on Cabergoline in the past, but tapered off after menopause in 2018.\par -Will follow-up on recent labs.\par -Repeat prolactin in January 2023.\par -Imaging?\par \par 4. Hyperlipidemia- improved, now at goal.\par -Continue statin.\par -Continue diet and exercise.\par \par 5. Vitamin D deficiency- resolved.\par -Continue vitamin D 2000 IU daily.

## 2022-07-13 NOTE — PHYSICAL EXAM
[Alert] : alert [Well Nourished] : well nourished [No Acute Distress] : no acute distress [Well Developed] : well developed [Normal Sclera/Conjunctiva] : normal sclera/conjunctiva [EOMI] : extra ocular movement intact [No Proptosis] : no proptosis [No Respiratory Distress] : no respiratory distress [No Accessory Muscle Use] : no accessory muscle use [Clear to Auscultation] : lungs were clear to auscultation bilaterally [Normal S1, S2] : normal S1 and S2 [Normal Rate] : heart rate was normal [Regular Rhythm] : with a regular rhythm [No Edema] : no peripheral edema [Not Distended] : not distended [Normal Anterior Cervical Nodes] : no anterior cervical lymphadenopathy [Normal Posterior Cervical Nodes] : no posterior cervical lymphadenopathy [Oriented x3] : oriented to person, place, and time [Normal Affect] : the affect was normal [Normal Mood] : the mood was normal [Acanthosis Nigricans] : no acanthosis nigricans [de-identified] : fullness in the thyroid

## 2022-07-15 ENCOUNTER — TRANSCRIPTION ENCOUNTER (OUTPATIENT)
Age: 57
End: 2022-07-15

## 2022-08-02 ENCOUNTER — APPOINTMENT (OUTPATIENT)
Dept: ORTHOPEDIC SURGERY | Facility: CLINIC | Age: 57
End: 2022-08-02

## 2022-08-02 VITALS — BODY MASS INDEX: 33.79 KG/M2 | WEIGHT: 236 LBS | HEIGHT: 70 IN

## 2022-08-02 DIAGNOSIS — Z96.651 PRESENCE OF RIGHT ARTIFICIAL KNEE JOINT: ICD-10-CM

## 2022-08-02 PROCEDURE — 73562 X-RAY EXAM OF KNEE 3: CPT | Mod: RT

## 2022-08-02 NOTE — ASSESSMENT
[FreeTextEntry1] : S/P RT TKA 4/18/22: NO F/C/S. DOING WELL. XRAYS REVIEWED WITH COMPONENTS WELL FIXED. NO SIGNS OF INFECTION. GOOD LIGAMENT BALANCE ON EXAM. ABX AND PRECAUTIONS REVIEWED. \par FOlLOW UP in APRIL 2023\par \par PENNSAID

## 2022-08-02 NOTE — HISTORY OF PRESENT ILLNESS
[7] : 7 [5] : 5 [Meds] : meds [Lying in bed] : lying in bed [de-identified] : pt is here for the first post op follow up of the right knee. pt states pain level has worsened since last visit. surgery was April 18,2022. doing well [FreeTextEntry1] : right knee  [de-identified] : home exercises

## 2022-08-02 NOTE — IMAGING
[All Views] : anteroposterior, lateral, skyline, and anteroposterior standing [Components well fixed, in good position] : Components well fixed, in good position [de-identified] : Constitutional: The patient appears well developed, well nourished. Examination of patients ability to communicate functionally was normal. \par \par Skin: Skin of the head and face is normal without rashes, lesions or ulcers. \par \par Cardiovascular: Peripheral Vascular System is normal. \par \par Neurologic: Alert and oriented to time, place and person. No evidence of mood disorder, calm affect. \par \par Right Knee: Inspection of the knee is as follows: no effusion, erythema, ecchymosis, scars or deformities. Palpation of the knee is as follows: lateral facet of patella tenderness, patellar compression tenderness and crepitus about the patella. Knee Range of Motion is as follows: full flexion and extension without pain (3-125). Strength examination of the knee is as follows: Quadriceps strength is 4/5 Hamstring strength is 4/5 Ligament Stability and Special Test ligamentously stable. \par \par Right Hip/Thigh: Inspection of the hip/thigh is as follows: Inspection shows no swelling and no ecchymosis. Range of motion of the hip is as follows: full ROM

## 2022-08-03 ENCOUNTER — FORM ENCOUNTER (OUTPATIENT)
Age: 57
End: 2022-08-03

## 2022-08-15 ENCOUNTER — APPOINTMENT (OUTPATIENT)
Dept: MRI IMAGING | Facility: CLINIC | Age: 57
End: 2022-08-15

## 2022-09-09 LAB
GLUCOSE SERPL-MCNC: 97 MG/DL
PROLACTIN SERPL-MCNC: 16.7 NG/ML

## 2022-09-20 ENCOUNTER — APPOINTMENT (OUTPATIENT)
Dept: SURGERY | Facility: CLINIC | Age: 57
End: 2022-09-20

## 2022-09-22 ENCOUNTER — APPOINTMENT (OUTPATIENT)
Dept: INTERNAL MEDICINE | Facility: CLINIC | Age: 57
End: 2022-09-22

## 2022-09-22 ENCOUNTER — OUTPATIENT (OUTPATIENT)
Dept: OUTPATIENT SERVICES | Facility: HOSPITAL | Age: 57
LOS: 1 days | Discharge: HOME | End: 2022-09-22

## 2022-09-22 DIAGNOSIS — Z98.890 OTHER SPECIFIED POSTPROCEDURAL STATES: Chronic | ICD-10-CM

## 2022-09-22 DIAGNOSIS — Z90.13 ACQUIRED ABSENCE OF BILATERAL BREASTS AND NIPPLES: Chronic | ICD-10-CM

## 2022-09-22 DIAGNOSIS — C50.919 MALIGNANT NEOPLASM OF UNSPECIFIED SITE OF UNSPECIFIED FEMALE BREAST: ICD-10-CM

## 2022-09-22 DIAGNOSIS — Z45.2 ENCOUNTER FOR ADJUSTMENT AND MANAGEMENT OF VASCULAR ACCESS DEVICE: Chronic | ICD-10-CM

## 2022-09-22 DIAGNOSIS — L73.9 FOLLICULAR DISORDER, UNSPECIFIED: Chronic | ICD-10-CM

## 2022-09-28 ENCOUNTER — APPOINTMENT (OUTPATIENT)
Dept: HEMATOLOGY ONCOLOGY | Facility: CLINIC | Age: 57
End: 2022-09-28

## 2022-09-28 PROCEDURE — 99213 OFFICE O/P EST LOW 20 MIN: CPT | Mod: 95

## 2022-09-28 NOTE — HISTORY OF PRESENT ILLNESS
[de-identified] : \par \par This 57-year-old woman initially presented with a left breast mass in September 2019. 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 Neoadjuvant dose dense Adriamycin/Cytoxan was followed by weekly Taxol, completed in February 2020. Left lumpectomy on March 30, 2020 showed complete pathologic response. On June 24, 2020 nipple sparing bilateral mastectomy was performed.\par She remains free of recurrent disease. Plastic surgery is following her HARSHIL flap reconstruction sites which have recently been revised\par . \par \par  [de-identified] : Telehealth Consent\par This encounter was done via Telehealth/ Telephone at home\par This visit was provided via telehealth using real-time 2-way audio visual technology.\par The patient ZOE CARTER was located at home at the time of the visit.\par Verbal consent for teleservices given on Sep 28, 2022 by ZOE CARTER\par \par Zoe remains free of recurrence.\par She has been diagnosed with Hashimoto's thyroiditis, and started on Unithroid 25 mcg daily.\par Continues on thyroid replacement medication denies any problems.\par \par Reports testing positive for Covid-19 1 week ago, symptoms of fatigue and runny nose. Reports she is still recovering. \par Reports residual scar tissue in right breast. Patient denied MRI by her insurance. Will continue to f/u with Dr. Roque for breast surveillance.\par S/p Right knee replacement in April 2022

## 2022-09-28 NOTE — ASSESSMENT
[FreeTextEntry1] : Triple negative poorly differentiated invasive ductal breast cancer, in complete remission following initial neoadjuvant chemotherapy with complete pathologic response and subsequent bilateral nipple sparing mastectomies with HARSHIL reconstruction. \par \par  \par Plan:\par \par Continue surveillance with 6-month follow-up. \par  \par

## 2022-09-28 NOTE — ADDENDUM
[FreeTextEntry1] : Documented by Terri Mallory acting as scribe for Dr. Dailey on 09/28/2022 \par \par All Medical record entries made by the Scribe were at my, Dr. Dailey's, direction and personally dictated by me on 09/28/2022 . I have reviewed the chart and agree that the record accurately reflects my personal performance of the history, physical exam, assessment and plan. I have also personally directed, reviewed, and agreed with the discharge instructions.\par

## 2022-10-04 ENCOUNTER — RX RENEWAL (OUTPATIENT)
Age: 57
End: 2022-10-04

## 2022-10-11 ENCOUNTER — APPOINTMENT (OUTPATIENT)
Dept: SURGERY | Facility: CLINIC | Age: 57
End: 2022-10-11

## 2022-10-25 ENCOUNTER — RESULT REVIEW (OUTPATIENT)
Age: 57
End: 2022-10-25

## 2022-10-25 ENCOUNTER — APPOINTMENT (OUTPATIENT)
Dept: ULTRASOUND IMAGING | Facility: CLINIC | Age: 57
End: 2022-10-25

## 2022-10-25 ENCOUNTER — OUTPATIENT (OUTPATIENT)
Dept: OUTPATIENT SERVICES | Facility: HOSPITAL | Age: 57
LOS: 1 days | End: 2022-10-25
Payer: COMMERCIAL

## 2022-10-25 DIAGNOSIS — Z00.8 ENCOUNTER FOR OTHER GENERAL EXAMINATION: ICD-10-CM

## 2022-10-25 DIAGNOSIS — Z90.13 ACQUIRED ABSENCE OF BILATERAL BREASTS AND NIPPLES: Chronic | ICD-10-CM

## 2022-10-25 DIAGNOSIS — Z98.890 OTHER SPECIFIED POSTPROCEDURAL STATES: Chronic | ICD-10-CM

## 2022-10-25 DIAGNOSIS — C50.919 MALIGNANT NEOPLASM OF UNSPECIFIED SITE OF UNSPECIFIED FEMALE BREAST: ICD-10-CM

## 2022-10-25 DIAGNOSIS — Z45.2 ENCOUNTER FOR ADJUSTMENT AND MANAGEMENT OF VASCULAR ACCESS DEVICE: Chronic | ICD-10-CM

## 2022-10-25 DIAGNOSIS — L73.9 FOLLICULAR DISORDER, UNSPECIFIED: Chronic | ICD-10-CM

## 2022-10-25 PROCEDURE — 76641 ULTRASOUND BREAST COMPLETE: CPT

## 2022-10-25 PROCEDURE — 76641 ULTRASOUND BREAST COMPLETE: CPT | Mod: 26,50

## 2022-11-22 NOTE — ASU PATIENT PROFILE, ADULT - PAIN RATING AT ACTIVITY
SUBJECTIVE  Juana Haney is a 46 year old  female who presents today for a follow up on:   Sjogren's Syndrome - had been doing well on Plaquenil over the summer at the 400mg dose - saw rheumatology at the end of September, only complaint that she had at that time were peripheral neuropathy - feathering, transient, moving, mostly in the extremities but also in the back and face intermittently.  At that appointment, the rheumatologist told her he did not agree with [facility]'s diagnosis of Sjogren's Syndrome - the doc at [place] had told her should always stay on Plaquenil long term, local rheum said he would like to see her wean off of it because it has a lot of bad side effects, and that she needed a lip biopsy for further diagnosis (two previous rheums, Dr. Mcdonough and [facility] both told her it was not necessary).  Is going to now see Dr. Yael Portillo 12/5/2022.   Has noticed that since having COVID, her neuropathy feeling has gotten worse and she has an internal tremor feeling that is present, no tremor in her extremities but feels it internally.        The patient's prior medical history is significant for:  Patient Active Problem List   Diagnosis   • Obesity (BMI 30.0-34.9)   • Anxiety disorder   • Sjogren's syndrome (CMS/HCC)   • Polyneuropathy   • Myofascial pain       ALLERGIES:  No Known Allergies    Current Outpatient Medications   Medication Sig Dispense Refill   • hydroxychloroquine (PLAQUENIL) 200 MG tablet Take 2 tablets by mouth daily. 180 tablet 0   • escitalopram (Lexapro) 10 MG tablet Take 1 tablet by mouth daily. 90 tablet 1   • Ergocalciferol 50 mcg (2,000 units) tablet Take 2,000 Units by mouth daily.     • fluticasone (FLONASE) 50 MCG/ACT nasal spray Spray 2 sprays in each nostril daily.     • ALPRAZolam (XANAX) 0.25 MG tablet Take 1 tablet by mouth 3 times daily. 30 tablet 1     No current facility-administered medications for this visit.         ROS:  As per above,  otherwise:  General: Patient denies fever, chills, night sweats, weight changes, or appetite changes  Respiratory: No coughing, wheezing, changes in voice, no shortness of breat  Cardiovascular:No chest pain, palpitations or other cardiac complaints noted  Gastrointestinal: No diarrhea, constipation, abdominal pain or other complaints noted  Rheumatologic: SJOGREN'S    OBJECTIVE:  Visit Vitals  /70   Pulse 100   Temp 98.2 °F (36.8 °C) (Temporal)   Ht 5' 9\" (1.753 m)   Wt 98.9 kg (218 lb)   SpO2 97%   BMI 32.19 kg/m²     Juana's BMI is Body mass index is 32.19 kg/m².   Physical exam   General appearance - alert & oriented, pleasant and comfortable  Heart - RRR w/o S3, S4, or murmurs.  The PMI is not displaced.  There is no JVD  Lungs - CTA throughout without crackles, rhonchi, or wheezes.  Patient has good air exchange without pleuritic symptoms.      ASSESSMENT/PLAN    Current problems:  Sjogren's syndrome, with unspecified organ involvement (CMS/HCC)  (primary encounter diagnosis)  Post covid-19 condition, unspecified  Encounter for screening mammogram for malignant neoplasm of breast  Generalized anxiety disorder      (1) Sjogren's syndrome - await further recommendations per new rheumatologist - continue Plaquenil at current dose for now.    (2) Post-covid symptoms? - will try supplement cocktail as prescribed for 1 month.    (3) Anxiety - stable on Lexapro - continue for now.    Instructed to call if the problem worsens or does not improve.  Complete Physical Exam in 6 months.    Electronically Signed by: Polly Chappell DO 11/22/2022       6

## 2022-12-01 ENCOUNTER — APPOINTMENT (OUTPATIENT)
Dept: BREAST CENTER | Facility: CLINIC | Age: 57
End: 2022-12-01

## 2022-12-01 VITALS
HEART RATE: 95 BPM | WEIGHT: 242 LBS | DIASTOLIC BLOOD PRESSURE: 73 MMHG | HEIGHT: 70 IN | SYSTOLIC BLOOD PRESSURE: 123 MMHG | BODY MASS INDEX: 34.65 KG/M2

## 2022-12-01 DIAGNOSIS — Z08 ENCOUNTER FOR FOLLOW-UP EXAMINATION AFTER COMPLETED TREATMENT FOR MALIGNANT NEOPLASM: ICD-10-CM

## 2022-12-01 DIAGNOSIS — Z78.9 OTHER SPECIFIED HEALTH STATUS: ICD-10-CM

## 2022-12-01 DIAGNOSIS — Z85.3 ENCOUNTER FOR FOLLOW-UP EXAMINATION AFTER COMPLETED TREATMENT FOR MALIGNANT NEOPLASM: ICD-10-CM

## 2022-12-01 DIAGNOSIS — Z72.3 LACK OF PHYSICAL EXERCISE: ICD-10-CM

## 2022-12-01 PROCEDURE — 99204 OFFICE O/P NEW MOD 45 MIN: CPT

## 2022-12-01 RX ORDER — MELOXICAM 15 MG/1
15 TABLET ORAL
Qty: 30 | Refills: 0 | Status: ACTIVE | COMMUNITY
Start: 2022-11-16

## 2022-12-01 RX ORDER — CELECOXIB 200 MG/1
200 CAPSULE ORAL
Refills: 0 | Status: ACTIVE | COMMUNITY

## 2022-12-01 RX ORDER — AMOXICILLIN 500 MG/1
500 TABLET, FILM COATED ORAL
Qty: 21 | Refills: 0 | Status: DISCONTINUED | COMMUNITY
Start: 2022-10-18

## 2022-12-01 RX ORDER — FLUTICASONE PROPIONATE 50 UG/1
50 SPRAY, METERED NASAL
Qty: 16 | Refills: 0 | Status: ACTIVE | COMMUNITY
Start: 2021-10-28

## 2022-12-01 NOTE — CONSULT LETTER
[Dear  ___] : Dear  [unfilled], [Consult Letter:] : I had the pleasure of evaluating your patient, [unfilled]. [Please see my note below.] : Please see my note below. [Consult Closing:] : Thank you very much for allowing me to participate in the care of this patient.  If you have any questions, please do not hesitate to contact me. [Sincerely,] : Sincerely, [FreeTextEntry3] : Sandy Miller MD FACS  [DrDorina  ___] : Dr. GUPTA [DrDorina ___] : Dr. GUPTA

## 2022-12-01 NOTE — PHYSICAL EXAM
[Normocephalic] : normocephalic [Atraumatic] : atraumatic [Sclera nonicteric] : sclera nonicteric [Supple] : supple [No Supraclavicular Adenopathy] : no supraclavicular adenopathy [No Cervical Adenopathy] : no cervical adenopathy [Clear to Auscultation Bilat] : clear to auscultation bilaterally [Normal Sinus Rhythm] : normal sinus rhythm [Examined in the supine and seated position] : examined in the supine and seated position [No dominant masses] : no dominant masses in right breast  [No dominant masses] : no dominant masses left breast [No Nipple Retraction] : no left nipple retraction [No Nipple Discharge] : no left nipple discharge [No Axillary Lymphadenopathy] : no left axillary lymphadenopathy [Soft] : abdomen soft [No Edema] : no edema [No Rashes] : no rashes [No Ulceration] : no ulceration [Asymmetrical] : asymmetrical [de-identified] : Medial periareolar scar with vertical extension. HARSHIL flap. Scar tissue at inferior aspect of NAC [de-identified] : Medial periareolar scar with vertical extension. HARSHIL flap

## 2022-12-01 NOTE — DATA REVIEWED
[FreeTextEntry1] : R complete US 1/29/21\par - edematous subcutaneous tissue consistent with postsurgical changes with 2.0 cm seroma in R retroareolar region\par - BIRADS 2\par \par Breast MRI 8/30/21\par - no suspicious enhancement, including the area around the right nipple\par - fat necrosis at area of concern in L upper inner bresat\par - BIRADS 2\par \par B/l complete US 10/25/22\par - no abnormality at areas of concern at 12:00 on both breasts\par - BIRADS 1

## 2022-12-01 NOTE — PAST MEDICAL HISTORY
[Menarche Age ____] : age at menarche was [unfilled] [Approximately ___] : the LMP was approximately [unfilled] [Total Preg ___] : G[unfilled] [History of Hormone Replacement Treatment] : has no history of hormone replacement treatment [FreeTextEntry7] : 20 years

## 2022-12-01 NOTE — HISTORY OF PRESENT ILLNESS
[FreeTextEntry1] : Ms. Pak is a 57 year old woman who presents for a consultation for surveillance for breast cancer. Her breast history is significant for\par \par 1.) Left infiltrating ductal carcinoma diagnosed in 2019 at age 54, clinical stage II, cT2 cN0, ER 0, [place] 0, Her2 0, SBR 8/9; complete pathologic response after neoadjuvant treatment\par - s/p neoadjuvant chemotherapy (Dr. Dailey, -T)\par - s/p L lumpectomy, sentinel node biopsy (3/20/20, Dr. Roque; surgical procedures limited at that time due to COVID pandemic) = no residual tumor, 0/1 L sln\par - s/p b/l mastectomy (6/24/20, Dr. Roque) with HARSHIL reconstruction (Dr. Daniels) = negative\par \par She is doing well. She recently noticed a slight caving in then bump in the superior breast that is symmetric bilaterally. She has an area of scar tissue by the right areola that has been the same. \par \par Her genetic panel testing is negative for any mutations and shows a VUS in the BARD1 gene. Her family history is not significant for any breast cancer.

## 2022-12-27 ENCOUNTER — TRANSCRIPTION ENCOUNTER (OUTPATIENT)
Age: 57
End: 2022-12-27

## 2023-01-06 ENCOUNTER — APPOINTMENT (OUTPATIENT)
Dept: PLASTIC SURGERY | Facility: CLINIC | Age: 58
End: 2023-01-06
Payer: COMMERCIAL

## 2023-01-06 VITALS
DIASTOLIC BLOOD PRESSURE: 76 MMHG | HEIGHT: 70 IN | BODY MASS INDEX: 34.65 KG/M2 | WEIGHT: 242 LBS | HEART RATE: 90 BPM | OXYGEN SATURATION: 99 % | SYSTOLIC BLOOD PRESSURE: 125 MMHG | TEMPERATURE: 97 F

## 2023-01-06 DIAGNOSIS — Z90.13 ACQUIRED ABSENCE OF BILATERAL BREASTS AND NIPPLES: ICD-10-CM

## 2023-01-06 LAB
25(OH)D3 SERPL-MCNC: 35.7 NG/ML
ALBUMIN SERPL ELPH-MCNC: 4.3 G/DL
ALP BLD-CCNC: 86 U/L
ALT SERPL-CCNC: 23 U/L
ANION GAP SERPL CALC-SCNC: 11 MMOL/L
AST SERPL-CCNC: 23 U/L
BASOPHILS # BLD AUTO: 0.04 K/UL
BASOPHILS NFR BLD AUTO: 0.7 %
BILIRUB SERPL-MCNC: 0.7 MG/DL
BUN SERPL-MCNC: 12 MG/DL
CALCIUM SERPL-MCNC: 10.1 MG/DL
CHLORIDE SERPL-SCNC: 102 MMOL/L
CHOLEST SERPL-MCNC: 179 MG/DL
CO2 SERPL-SCNC: 27 MMOL/L
CREAT SERPL-MCNC: 0.91 MG/DL
EGFR: 74 ML/MIN/1.73M2
EOSINOPHIL # BLD AUTO: 0.15 K/UL
EOSINOPHIL NFR BLD AUTO: 2.6 %
ESTIMATED AVERAGE GLUCOSE: 120 MG/DL
GLUCOSE SERPL-MCNC: 94 MG/DL
HBA1C MFR BLD HPLC: 5.8 %
HCT VFR BLD CALC: 42.5 %
HDLC SERPL-MCNC: 99 MG/DL
HGB BLD-MCNC: 13.6 G/DL
IMM GRANULOCYTES NFR BLD AUTO: 0.2 %
LDLC SERPL CALC-MCNC: 62 MG/DL
LYMPHOCYTES # BLD AUTO: 2.27 K/UL
LYMPHOCYTES NFR BLD AUTO: 39.8 %
MAN DIFF?: NORMAL
MCHC RBC-ENTMCNC: 28.4 PG
MCHC RBC-ENTMCNC: 32 GM/DL
MCV RBC AUTO: 88.7 FL
MONOCYTES # BLD AUTO: 0.64 K/UL
MONOCYTES NFR BLD AUTO: 11.2 %
NEUTROPHILS # BLD AUTO: 2.59 K/UL
NEUTROPHILS NFR BLD AUTO: 45.5 %
NONHDLC SERPL-MCNC: 80 MG/DL
PLATELET # BLD AUTO: 307 K/UL
POTASSIUM SERPL-SCNC: 4.6 MMOL/L
PROLACTIN SERPL-MCNC: 11.2 NG/ML
PROT SERPL-MCNC: 7.1 G/DL
RBC # BLD: 4.79 M/UL
RBC # FLD: 14.2 %
SODIUM SERPL-SCNC: 140 MMOL/L
T3FREE SERPL-MCNC: 2.87 PG/ML
T4 FREE SERPL-MCNC: 1.4 NG/DL
TRIGL SERPL-MCNC: 91 MG/DL
TSH SERPL-ACNC: 0.85 UIU/ML
VIT B12 SERPL-MCNC: 474 PG/ML
WBC # FLD AUTO: 5.7 K/UL

## 2023-01-06 PROCEDURE — 99212 OFFICE O/P EST SF 10 MIN: CPT

## 2023-01-06 NOTE — PHYSICAL EXAM
[de-identified] : b/l breasts soft, nt. incisions well healed. right breast scar softer. good volume and symmetry to breasts.  [de-identified] : abdomen soft, nt. incisions well healed. no hernia or bulge. no hypertrophic scarring.

## 2023-01-06 NOTE — HISTORY OF PRESENT ILLNESS
[FreeTextEntry1] : 57 y.o. F history of triple negative left breast CA who is s/p b/l nipple sparing mastectomies with HARSHIL flap reconstruction on 6/24/2020 s/p revision of b/l breast reconstruction and abdominal donor site scar on 10/27/20 presents today for a routine post operative visit. She reports doing well and reports compliance with daily tissue massage to right breast scar region; she has noticed improvement in her scar. Since her last visit with us, she has undergone a total right knee replacement and has retired; she is in good spirits and is happy with her reconstruction.

## 2023-01-10 ENCOUNTER — APPOINTMENT (OUTPATIENT)
Dept: OBGYN | Facility: CLINIC | Age: 58
End: 2023-01-10
Payer: COMMERCIAL

## 2023-01-10 VITALS
WEIGHT: 246.5 LBS | BODY MASS INDEX: 35.29 KG/M2 | SYSTOLIC BLOOD PRESSURE: 126 MMHG | DIASTOLIC BLOOD PRESSURE: 80 MMHG | HEIGHT: 70 IN

## 2023-01-10 PROCEDURE — 99396 PREV VISIT EST AGE 40-64: CPT

## 2023-01-10 RX ORDER — DICLOFENAC SODIUM 20 MG/G
2 SOLUTION TOPICAL
Qty: 1 | Refills: 3 | Status: DISCONTINUED | COMMUNITY
Start: 2022-08-02 | End: 2023-01-10

## 2023-01-10 NOTE — HISTORY OF PRESENT ILLNESS
[Y] : Patient is sexually active [N] : Patient denies prior pregnancies [Menarche Age: ____] : age at menarche was [unfilled] [Menopause Age: ____] : age at menopause was [unfilled] [BreastSonogramDate] : 10/22 [PapSmeardate] : 01/22 [PGHxTotal] : 0 [PGHxFullTerm] : 0 [PGHxPremature] : 0 [PGHxAbortions] : 0 [[facility] Center[facility]Living] : 0 [PGHxABInduced] : 0 [PGHxABSpont] : 0 [PGHxEctopic] : 0 [PGHxMultBirths] : 0

## 2023-01-11 ENCOUNTER — APPOINTMENT (OUTPATIENT)
Dept: ENDOCRINOLOGY | Facility: CLINIC | Age: 58
End: 2023-01-11
Payer: COMMERCIAL

## 2023-01-11 VITALS
HEIGHT: 70 IN | WEIGHT: 247 LBS | BODY MASS INDEX: 35.36 KG/M2 | SYSTOLIC BLOOD PRESSURE: 114 MMHG | DIASTOLIC BLOOD PRESSURE: 76 MMHG

## 2023-01-11 LAB
GLUCOSE BLDC GLUCOMTR-MCNC: 100
HPV HIGH+LOW RISK DNA PNL CVX: NOT DETECTED

## 2023-01-11 PROCEDURE — 99214 OFFICE O/P EST MOD 30 MIN: CPT | Mod: 25

## 2023-01-11 PROCEDURE — 82962 GLUCOSE BLOOD TEST: CPT

## 2023-01-11 RX ORDER — LANCETS 18 GAUGE
EACH MISCELLANEOUS
Qty: 1 | Refills: 1 | Status: ACTIVE | COMMUNITY
Start: 2023-01-11 | End: 1900-01-01

## 2023-01-11 RX ORDER — BLOOD SUGAR DIAGNOSTIC
STRIP MISCELLANEOUS
Qty: 1 | Refills: 1 | Status: ACTIVE | COMMUNITY
Start: 2023-01-11 | End: 1900-01-01

## 2023-01-11 RX ORDER — BLOOD-GLUCOSE METER
W/DEVICE EACH MISCELLANEOUS
Qty: 1 | Refills: 0 | Status: ACTIVE | COMMUNITY
Start: 2023-01-11 | End: 1900-01-01

## 2023-01-11 NOTE — PHYSICAL EXAM
[Alert] : alert [Well Nourished] : well nourished [No Acute Distress] : no acute distress [Well Developed] : well developed [Normal Sclera/Conjunctiva] : normal sclera/conjunctiva [EOMI] : extra ocular movement intact [No Proptosis] : no proptosis [No Respiratory Distress] : no respiratory distress [No Accessory Muscle Use] : no accessory muscle use [Clear to Auscultation] : lungs were clear to auscultation bilaterally [Normal S1, S2] : normal S1 and S2 [Normal Rate] : heart rate was normal [Regular Rhythm] : with a regular rhythm [No Edema] : no peripheral edema [Not Distended] : not distended [Normal Anterior Cervical Nodes] : no anterior cervical lymphadenopathy [Oriented x3] : oriented to person, place, and time [Normal Affect] : the affect was normal [Normal Mood] : the mood was normal [Acanthosis Nigricans] : no acanthosis nigricans [de-identified] : fullness in the thyroid

## 2023-01-11 NOTE — HISTORY OF PRESENT ILLNESS
[FreeTextEntry1] : Initial visit: H/o Pre-Diabetes, Hashimoto's and prolactinoma. Pt was on Cabergoline in past but was tapered off about 3 years ago after going through menopause. Breast Cancer diagnosed 9/2019, double mastectomy with reconstruction in 6/2020. In remission, CT-Scan 8/30/21, no lesions found. Last Period 12/2018.\par Denies headaches, blurry vision\par \par INTERVAL HISTORY: Reports feeling well today. Gained weight since knee replacement, about 10 pounds. Has not really been exercising. PCP started atorvastatin 1 year ago. Patient was surprised she needed it because she had never been told her cholesterol was high before.\par \par Quality: Pre-Diabetes \par Duration: 4 years \par Modifying Factors: Metformin\par Family Hx: Paternal Aunts \par \par No SMBG.\par \par Current Regimen:\par Metformin 500 mg BID\par \par Eye Exam: December 2022, all ok. FH of glaucoma\par Foot Exam: occasional neuropathy after chemo, has improved\par Kidney Disease: none \par Heart Disease:none\par \par Weight: gained 10 pounds since April 2022\par Diet: 3 meals daily, eats the same thing all the time\par B- eggs and toast\par L- turkey or tuna sandwich\par D-  cooks, pasta\par Exercise: none\par Smoking: ex-smoker \par ================================\par Hashimoto's (Lab 9/2021: Tg ab neg, TPO ab 246.0) \par Onset: fatigue\par Duration:9/2021\par Modifying factors: better with meds\par \par Current Medications: Unithroid 25 mcg QD, takes appropriately\par Thyroid U/S: 9/2021, normal thyroid u/s\par Since starting Unithroid, fatigue has improved \par ================================\par Hyperprolactinemia \par Prolactin 11.2\par On Cabergoline in past but was tapered off about 3 years ago after going through menopause

## 2023-01-11 NOTE — ASSESSMENT
[FreeTextEntry1] : 57 year old female, PMH significant for breast cancer s/p double mastectomy with reconstruction in 6/2020, now in remission, also with prediabetes, hypothyroidism, and history of hyperprolactinemia. \par \par 1. Prediabetes- stable, A1c 5.8%.\par -Continue Metformin 500 mg BID.\par -Continue to work on lifestyle modifications- diet, exercise, and weight loss- to prevent progression to T2DM.\par -Start SMBG 1-2x per week at varying times in the day.\par -Repeat A1c in 6 months.\par \par 2. Hashimoto's hypothyroidism- TPO ab 246.0, Tg ab neg\par -Euthyroid on replacement T4.\par -Continue Unithroid 25 mcg daily.\par \par 3. Hyperprolactinemia- on Cabergoline in the past, but tapered off after menopause in 2018.\par -Prolactin level normal.\par -Repeat prolactin in 6 months.\par -According to guidelines, no need for repeat imaging in postmenopausal woman unless levels rise to >200.\par \par 4. Hyperlipidemia- improved, now at goal.\par -Continue statin.\par -Continue diet and exercise.\par \par 5. Vitamin D deficiency- resolved.\par -Continue vitamin D 2000 IU daily. \par

## 2023-01-11 NOTE — REVIEW OF SYSTEMS
[Recent Weight Gain (___ Lbs)] : recent weight gain: [unfilled] lbs [Blurred Vision] : blurred vision [Pain/Numbness of Digits] : pain/numbness of digits [Fatigue] : no fatigue [Dysphagia] : no dysphagia [Neck Pain] : no neck pain [Chest Pain] : no chest pain [Palpitations] : no palpitations [Shortness Of Breath] : no shortness of breath [Nausea] : no nausea [Constipation] : no constipation [Abdominal Pain] : no abdominal pain [Vomiting] : no vomiting [Diarrhea] : no diarrhea [Polyuria] : no polyuria [Tremors] : no tremors [Depression] : no depression [Anxiety] : no anxiety [Polydipsia] : no polydipsia

## 2023-01-12 ENCOUNTER — APPOINTMENT (OUTPATIENT)
Dept: OBGYN | Facility: CLINIC | Age: 58
End: 2023-01-12

## 2023-01-13 LAB
MONOMERIC PROLACTIN (ICMA)*: 9.98 NG/ML
PERCENT MACROPROLACTIN: 16 %
PROLACTIN, SERUM (ICMA)*: 11.9 NG/ML

## 2023-01-15 LAB — CYTOLOGY CVX/VAG DOC THIN PREP: NORMAL

## 2023-01-23 ENCOUNTER — APPOINTMENT (OUTPATIENT)
Dept: RADIOLOGY | Facility: CLINIC | Age: 58
End: 2023-01-23
Payer: COMMERCIAL

## 2023-01-23 ENCOUNTER — OUTPATIENT (OUTPATIENT)
Dept: OUTPATIENT SERVICES | Facility: HOSPITAL | Age: 58
LOS: 1 days | End: 2023-01-23
Payer: COMMERCIAL

## 2023-01-23 DIAGNOSIS — Z98.890 OTHER SPECIFIED POSTPROCEDURAL STATES: Chronic | ICD-10-CM

## 2023-01-23 DIAGNOSIS — L73.9 FOLLICULAR DISORDER, UNSPECIFIED: Chronic | ICD-10-CM

## 2023-01-23 DIAGNOSIS — Z13.820 ENCOUNTER FOR SCREENING FOR OSTEOPOROSIS: ICD-10-CM

## 2023-01-23 DIAGNOSIS — Z90.13 ACQUIRED ABSENCE OF BILATERAL BREASTS AND NIPPLES: Chronic | ICD-10-CM

## 2023-01-23 DIAGNOSIS — Z45.2 ENCOUNTER FOR ADJUSTMENT AND MANAGEMENT OF VASCULAR ACCESS DEVICE: Chronic | ICD-10-CM

## 2023-01-23 PROCEDURE — 77080 DXA BONE DENSITY AXIAL: CPT | Mod: 26

## 2023-01-23 PROCEDURE — 77080 DXA BONE DENSITY AXIAL: CPT

## 2023-02-08 ENCOUNTER — OUTPATIENT (OUTPATIENT)
Dept: OUTPATIENT SERVICES | Facility: HOSPITAL | Age: 58
LOS: 1 days | Discharge: HOME | End: 2023-02-08

## 2023-02-08 DIAGNOSIS — Z98.890 OTHER SPECIFIED POSTPROCEDURAL STATES: Chronic | ICD-10-CM

## 2023-02-08 DIAGNOSIS — L73.9 FOLLICULAR DISORDER, UNSPECIFIED: Chronic | ICD-10-CM

## 2023-02-08 DIAGNOSIS — Z90.13 ACQUIRED ABSENCE OF BILATERAL BREASTS AND NIPPLES: Chronic | ICD-10-CM

## 2023-02-08 DIAGNOSIS — C50.919 MALIGNANT NEOPLASM OF UNSPECIFIED SITE OF UNSPECIFIED FEMALE BREAST: ICD-10-CM

## 2023-02-08 DIAGNOSIS — Z45.2 ENCOUNTER FOR ADJUSTMENT AND MANAGEMENT OF VASCULAR ACCESS DEVICE: Chronic | ICD-10-CM

## 2023-02-15 ENCOUNTER — RESULT REVIEW (OUTPATIENT)
Age: 58
End: 2023-02-15

## 2023-02-15 ENCOUNTER — APPOINTMENT (OUTPATIENT)
Dept: HEMATOLOGY ONCOLOGY | Facility: CLINIC | Age: 58
End: 2023-02-15
Payer: COMMERCIAL

## 2023-02-15 VITALS
BODY MASS INDEX: 35.65 KG/M2 | HEIGHT: 70 IN | HEART RATE: 87 BPM | OXYGEN SATURATION: 96 % | SYSTOLIC BLOOD PRESSURE: 116 MMHG | WEIGHT: 249.01 LBS | DIASTOLIC BLOOD PRESSURE: 76 MMHG | TEMPERATURE: 96.8 F

## 2023-02-15 LAB
ALBUMIN SERPL ELPH-MCNC: 4.5 G/DL
ALP BLD-CCNC: 84 U/L
ALT SERPL-CCNC: 24 U/L
ANION GAP SERPL CALC-SCNC: 13 MMOL/L
AST SERPL-CCNC: 21 U/L
BASOPHILS # BLD AUTO: 0.1 K/UL — SIGNIFICANT CHANGE UP (ref 0–0.2)
BASOPHILS NFR BLD AUTO: 1.7 % — SIGNIFICANT CHANGE UP (ref 0–2)
BILIRUB SERPL-MCNC: 0.7 MG/DL
BUN SERPL-MCNC: 12 MG/DL
CALCIUM SERPL-MCNC: 9.9 MG/DL
CHLORIDE SERPL-SCNC: 104 MMOL/L
CO2 SERPL-SCNC: 24 MMOL/L
CREAT SERPL-MCNC: 0.86 MG/DL
EGFR: 80 ML/MIN/1.73M2
EOSINOPHIL # BLD AUTO: 0.1 K/UL — SIGNIFICANT CHANGE UP (ref 0–0.5)
EOSINOPHIL NFR BLD AUTO: 2.1 % — SIGNIFICANT CHANGE UP (ref 0–6)
GLUCOSE SERPL-MCNC: 84 MG/DL
HCT VFR BLD CALC: 42.6 % — SIGNIFICANT CHANGE UP (ref 34.5–45)
HGB BLD-MCNC: 14 G/DL — SIGNIFICANT CHANGE UP (ref 11.5–15.5)
LYMPHOCYTES # BLD AUTO: 2 K/UL — SIGNIFICANT CHANGE UP (ref 1–3.3)
LYMPHOCYTES # BLD AUTO: 38 % — SIGNIFICANT CHANGE UP (ref 13–44)
MCHC RBC-ENTMCNC: 28.5 PG — SIGNIFICANT CHANGE UP (ref 27–34)
MCHC RBC-ENTMCNC: 33 G/DL — SIGNIFICANT CHANGE UP (ref 32–36)
MCV RBC AUTO: 86.5 FL — SIGNIFICANT CHANGE UP (ref 80–100)
MONOCYTES # BLD AUTO: 0.6 K/UL — SIGNIFICANT CHANGE UP (ref 0–0.9)
MONOCYTES NFR BLD AUTO: 11.2 % — SIGNIFICANT CHANGE UP (ref 2–14)
NEUTROPHILS # BLD AUTO: 2.4 K/UL — SIGNIFICANT CHANGE UP (ref 1.8–7.4)
NEUTROPHILS NFR BLD AUTO: 47 % — SIGNIFICANT CHANGE UP (ref 43–77)
PLATELET # BLD AUTO: 326 K/UL — SIGNIFICANT CHANGE UP (ref 150–400)
POTASSIUM SERPL-SCNC: 4.3 MMOL/L
PROT SERPL-MCNC: 7.1 G/DL
RBC # BLD: 4.93 M/UL — SIGNIFICANT CHANGE UP (ref 3.8–5.2)
RBC # FLD: 12.7 % — SIGNIFICANT CHANGE UP (ref 10.3–14.5)
SODIUM SERPL-SCNC: 141 MMOL/L
WBC # BLD: 5.2 K/UL — SIGNIFICANT CHANGE UP (ref 3.8–10.5)
WBC # FLD AUTO: 5.2 K/UL — SIGNIFICANT CHANGE UP (ref 3.8–10.5)

## 2023-02-15 PROCEDURE — 99213 OFFICE O/P EST LOW 20 MIN: CPT

## 2023-02-16 NOTE — PHYSICAL EXAM
[Normal] : well developed, well nourished, in no acute distress [de-identified] : s/P bilateral mastectomy with reconstruction

## 2023-02-16 NOTE — ASSESSMENT
[FreeTextEntry1] : \par Triple negative poorly differentiated invasive ductal breast cancer, in complete remission following initial neoadjuvant chemotherapy with complete pathologic response and subsequent bilateral nipple sparing mastectomies with HARSHIL reconstruction. \par \par  \par Plan:\par \par Continue surveillance with 6-month follow-up. \par  \par

## 2023-02-16 NOTE — HISTORY OF PRESENT ILLNESS
[de-identified] : \par \par This 57-year-old woman initially presented with a left breast mass in September 2019. 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 Neoadjuvant dose dense Adriamycin/Cytoxan was followed by weekly Taxol, completed in February 2020. Left lumpectomy on March 30, 2020 showed complete pathologic response. On June 24, 2020 nipple sparing bilateral mastectomy was performed.\par She remains free of recurrent disease. Plastic surgery is following her HARSHIL flap reconstruction sites which have recently been revised\par . \par \par  \par \par \par  [de-identified] : Continues to do well.  Now maintained on thyroid hormone replacement following a diagnosis of hypothyroidism.

## 2023-03-30 ENCOUNTER — RX RENEWAL (OUTPATIENT)
Age: 58
End: 2023-03-30

## 2023-05-05 ENCOUNTER — RX RENEWAL (OUTPATIENT)
Age: 58
End: 2023-05-05

## 2023-06-01 ENCOUNTER — APPOINTMENT (OUTPATIENT)
Dept: BREAST CENTER | Facility: CLINIC | Age: 58
End: 2023-06-01

## 2023-06-14 ENCOUNTER — TRANSCRIPTION ENCOUNTER (OUTPATIENT)
Age: 58
End: 2023-06-14

## 2023-07-08 ENCOUNTER — LABORATORY RESULT (OUTPATIENT)
Age: 58
End: 2023-07-08

## 2023-07-12 ENCOUNTER — APPOINTMENT (OUTPATIENT)
Dept: ENDOCRINOLOGY | Facility: CLINIC | Age: 58
End: 2023-07-12
Payer: COMMERCIAL

## 2023-07-12 VITALS
DIASTOLIC BLOOD PRESSURE: 80 MMHG | HEIGHT: 70 IN | WEIGHT: 251 LBS | SYSTOLIC BLOOD PRESSURE: 128 MMHG | BODY MASS INDEX: 35.93 KG/M2 | HEART RATE: 87 BPM | OXYGEN SATURATION: 98 %

## 2023-07-12 PROCEDURE — 99214 OFFICE O/P EST MOD 30 MIN: CPT

## 2023-07-12 RX ORDER — COVID-19 ANTIGEN TEST
KIT MISCELLANEOUS
Qty: 8 | Refills: 0 | Status: DISCONTINUED | COMMUNITY
Start: 2022-10-20 | End: 2023-07-12

## 2023-07-12 NOTE — HISTORY OF PRESENT ILLNESS
[FreeTextEntry1] : INITIAL VISIT: H/o Pre-Diabetes, Hashimoto's and prolactinoma. Pt was on Cabergoline in past but was tapered off about 3 years ago after going through menopause. Breast Cancer diagnosed 9/2019, double mastectomy with reconstruction in 6/2020. In remission, CT-Scan 8/30/21, no lesions found. Last Period 12/2018.\par Denies headaches, blurry vision\par \par INTERVAL HISTORY: Has been unable to lose weight despite diet and exercise efforts. Asking about Ozempic.\par \par Quality: Pre-Diabetes \par Duration: 4 years \par Modifying Factors: Metformin\par Family Hx: Paternal Aunts \par \par SMBG once daily, fasting in AM. FBG 80s to 90s. Rare post meal readings 120s to 140s.\par \par Current Regimen:\par Metformin 500 mg BID\par \par Eye Exam: June 2023, all ok. FH of glaucoma\par Foot Exam: occasional neuropathy after chemo, has improved\par Kidney Disease: none \par Heart Disease:none\par \par Weight: fluctuates\par Diet: 3 meals daily, eats the same thing all the time\par B- eggs and toast\par L- turkey or tuna sandwich\par D-  cooks, pasta\par Exercise: stationary bike, 5x weekly\par Smoking: ex-smoker \par ================================\par Hashimoto's (Lab 9/2021: Tg ab neg, TPO ab 246.0) \par Onset: fatigue\par Duration:9/2021\par Modifying factors: better with meds\par \par Current Medications: Unithroid 25 mcg QD, takes appropriately\par Thyroid U/S: 9/2021, normal thyroid u/s\par Since starting Unithroid, fatigue has improved \par ================================\par Hyperprolactinemia \par Prolactin 11.2\par On Cabergoline in past but was tapered off about 3 years ago after going through menopause \par

## 2023-07-12 NOTE — PHYSICAL EXAM
Radiation Oncology Consultation     Date of Service: 2/13/23     REFERRING PHYSICIAN: Remington Monk DO    DIAGNOSIS: 1) melanoma of unknown primary, oligometastatic to right breast, in remission; 2) low-lying rectal squamous cell carcinoma, at least cT3N1 staging work-up incomplete    CHIEF COMPLAINT: rectal pain, difficult with bowel movements    HISTORY OF PRESENT ILLNESS: Ms. Kenney is a 54 year old woman with a history of brain aneurysm and melanoma of unknown primary, oligometastatic to right breast, in remission (since 2018), now presenting with newly diagnosed low-lying anorectal squamous cell carcinoma, at least cT3N1 but staging work-up incomplete. Oncologic history is as follows:    2018: Diagnosed with malignant melanoma (non-cutaneous) of the right breast likely of unknown primary, underwent mastectomy/SLNBx demonstrating TxN0M1 disease with negative margins, BRAF negative.     5/2018 - 2/2019: Adjuvant nivolumab, discontinued due to dermatitis.     1/2023: CT C/A/P for surveillance of melanoma - asymmetric wall thickening of the rectum and mass-like soft tissue within the right perianal region, subcentimeter perirectal lymph nodes increased in size compared to prior exams. Of note, she had been experiencing increasing rectal pain and thinning of her bowel movements over the past month.     2/2023: Colonoscopy with Dr. Mills - 4-5 cm rectal mass noted above the dentate line, mainly on the left half circumference; biopsy - squamous cell carcinoma; colonic polyps removed - tubular adenomas.     Today in clinic, she does not feel great, has rectal pain constantly, worse with bowel movements which have gotten thinner, though she is able to pass stool and gas, stool is occasionally tarry, has had some accidents recently, no abdominal pain or bloated feeling, no nausea/vomiting, no vaginal bleeding though does have some white discharge thinks it might be a yeast infection, also having some issues  peeing (hard to get it out), no blood in the urine, no cloudiness, no dysuria, no weight loss, no chest pain or cough or shortness of breath, no bone pain. Currently smoking but knows she needs to try to quit.    Intent of the treatment is curative.  Pain Assessment and Intensity:  The patient currently has pain, rated at 5 on a scale of 10.  Pain Management Plan: The patient currently has pain as noted above. Pain management plan per recommendations noted below.  History of Prior Radiation Therapy: none  History of Connective Tissue Disorder: negative  History of Inflammatory Bowel Disease: negative  Pacemaker / AICD: none  Pregnancy Status: not pregnant  Past Medical History:   Diagnosis Date   • Arthritis    • Attention deficit disorder    • Bronchitis    • Carpal tunnel syndrome 2/10/2006   • Cerebral infarction (CMS/HCC) 06/25/2016    no residual    • COPD (chronic obstructive pulmonary disease) (CMS/[facility])    • Depression    • Diabetes mellitus (CMS/HCC)     Type 2- checks blood sugars 3 times a day   • Dyslipidemia    • Eczema    • Esophageal varices in cirrhosis (CMS/[facility])    • Essential (primary) hypertension    • Liver cirrhosis (CMS/HCC) 10/08/2021   • Macular hole of left eye    • Melanoma (CMS/HCC)     right breast   • Myopia with presbyopia 8/29/2016   • TIKA (obstructive sleep apnea)     not using CPAP   • PHT (portal hypertension) (CMS/[facility]) 10/08/2021   • Pulmonary embolism and infarction 01/29/2011   • Pulmonary embolism, bilateral (CMS/HCC) 01/28/2011             Current Outpatient Medications   Medication Sig Dispense Refill   • FLUoxetine (PROzac) 20 MG capsule Take 1 capsule by mouth daily for 14 days, THEN 2 capsules daily. 70 capsule 0   • amphetamine-dextroamphetamine (Adderall) 20 MG tablet Take 1 tablet by mouth 3 times daily. Do not start before January 24, 2023. 90 tablet 0   • buprenorphine-naLOXone (SUBOXONE FILM) 12-3 MG sublingual film Place a HALF strip under the tongue in the morning  [Alert] : alert and a HALF strip at noon and a HALF strip in the evening. 42 strip 1   • carvedilol (COREG) 6.25 MG tablet TAKE 1 TABLET BY MOUTH IN THE MORNING AND IN THE EVENING WITH MEALS 180 tablet 0   • nystatin (MYCOSTATIN) 836250 UNIT/GM cream Apply topically 2 times daily. 30 g 0   • blood glucose test strip Test blood sugar 4 times daily. Diagnosis: Diabetes Mellitus. Meter: One Touch 100 each 0   • blood glucose lancets Test blood sugar 4 times daily. Diagnosis: Diabetes Mellitus. Meter: One Touch 100 each 0   • cyanocobalamin (Vitamin B-12) 100 MCG tablet Take 1 tablet by mouth daily. 30 tablet 0   • Cholecalciferol (vitamin D3) 125 mcg (5,000 units) capsule Take 1 capsule by mouth daily. 30 capsule 0   • ibuprofen (MOTRIN) 400 MG tablet Take 1 tablet by mouth every 6 hours as needed for Pain. 30 tablet 0   • blood glucose meter Test blood sugar 4 times daily. Diagnosis: Diabetes Mellitus. Meter: One Touch 1 kit 0   • simvastatin (ZOCOR) 40 MG tablet Take 1 tablet by mouth nightly. 90 tablet 3   • lisinopril (ZESTRIL) 20 MG tablet Take 1 tablet by mouth daily. 90 tablet 0   • dulaglutide (Trulicity) 0.75 MG/0.5ML pen-injector INJECT CONTENTS OF 1 PEN SUBCUTANEOUSLY EVERY 7 DAYS 3 mL 3   • omeprazole (PriLOSEC) 40 MG capsule Take 1 capsule by mouth 2 times daily. 180 capsule 11   • insulin degludec (Tresiba FlexTouch) 200 UNIT/ML pen-injector Inject 80 Units into the skin daily. Prime 2 units before each dose. 36 mL 3   • Insulin Lispro, 1 Unit Dial, (HumaLOG KwikPen) 100 UNIT/ML pen-injector Inject 30 Units into the skin 2 times daily (with meals). plus sliding scale 2:50>150. Max 80 units daily 60 mL 3     No current facility-administered medications for this visit.     ALLERGIES:  No Known Allergies   Social History     Socioeconomic History   • Marital status:      Spouse name: Not on file   • Number of children: Not on file   • Years of education: Not on file   • Highest education level: Not on file    Occupational History   • Not on file   Tobacco Use   • Smoking status: Every Day     Packs/day: 1.00     Years: 30.00     Pack years: 30.00     Types: Cigarettes   • Smokeless tobacco: Never   Vaping Use   • Vaping Use: never used   Substance and Sexual Activity   • Alcohol use: Yes     Comment: occasionally 0-1 every couple of months   • Drug use: No   • Sexual activity: Not Currently   Other Topics Concern   • Not on file   Social History Narrative    2 daughters ages 31 and 25. A step grandchild age 8.    Boyfriend Tan of 21 years    Lives in a camper in [place] during the summer and in [place] during the winter months.      Social Determinants of Health     Financial Resource Strain: Not on file   Food Insecurity: Not on file   Transportation Needs: Not on file   Physical Activity: Not on file   Stress: Not on file   Social Connections: Not on file   Intimate Partner Violence: Not At Risk   • Social Determinants: Intimate Partner Violence Past Fear: No   • Social Determinants: Intimate Partner Violence Current Fear: No      Family History   Problem Relation Age of Onset   • Hypertension Mother    • Osteoarthritis Mother    • Crohn's Disease Father    • Migraine Father    • Substance Abuse Father         pills   • Cataracts Father    • Diabetes Sister    • Osteoarthritis Sister    • COPD Sister    • Patient is unaware of any medical problems Sister    • Diabetes Maternal Grandmother    • Patient is unaware of any medical problems Daughter    • Patient is unaware of any medical problems Daughter       REVIEW OF SYSTEMS:   Constitutional:  Denies fever or chills, or weight loss  Eyes:  Denies change in visual acuity   HENT:  Denies nasal congestion or sore throat   Respiratory:  Denies cough or shortness of breath   Cardiovascular:  Denies chest pain or edema   GI: As per HPI  : As per HPI  Musculoskeletal:  Denies back pain or joint pain   Integument:  Denies rash   Neurologic:  Denies headache, focal  [Well Nourished] : well nourished [No Acute Distress] : no acute distress weakness or sensory changes   Endocrine:  Denies polyuria or polydipsia   Lymphatic:  Denies swollen glands   Psychiatric:  Denies depression or anxiety     PHYSICAL EXAMINATION:   Vitals:    23 1547   BP: (!) 225/102   Pulse: 64   Resp: 18   Temp: 98.2 °F (36.8 °C)     GENERAL: Appears somewhat uncomfortable and anxious  HEAD AND NECK: No palpable cervical or supraclavicular lymphadenopathy.   LUNGS: Breathing comfortably on room air   CARDIAC: Regular rate and rhythm, no pedal edema  ABDOMEN: Soft, nontender, without organomegaly or masses.   GENITOURINARY: No vaginal invasion of tumor on speculum exam, unable to pass speculum into vaginal vault given firm mass/protuberance of urethra into the vagina, on digital exam no cervix palpated (consistent with her reported history of hysterectomy), palpable mass against the posterior vaginal wall (rectal mass, no invasion), + white vaginal discharge  RECTAL: Palpable, circumferential mass beginning at ~ 2 cm from the anal verge extending at least 5 cm proximally, able to pass finger beyond the mass (not completely obstructing), good rectal tone  MUSCULOSKELETAL: Full range of motion, no back or joint pain to palpation  NEUROLOGIC: she is intact. There are no focal findings.   LYMPHATIC: No inguinal adenopathy  ECO    LABS/IMAGING:    CT C/A/P 2023 -             No pertinent laboratory findings    IMPRESSION: Ms. Kenney is a 54 year old woman with a history of brain aneurysm and melanoma of unknown primary, oligometastatic to right breast, in remission (since 2018), now presenting with newly diagnosed low-lying anorectal squamous cell carcinoma, at least cT3N1 but staging work-up incomplete.     RECOMMENDATIONS AND DISCUSSION:     1. Complete work-up with MR rectum and PET/CT.     2. Assuming this demonstrates locoregionally confined disease, standard of care for anorectal squamous cell carcinoma is definitive chemoradiation with curative intent. Will  [Well Developed] : well developed perform CT simulation on 2/16/22 once her scans have resulted.     3. Will expedite treatment start given partially obstructing mass. Gave her warning symptoms with which to notify myself immediately if experiences complete obstruction, explained I do not think this is likely but if this occurs will be a medical emergency and would need emergency surgical diversion. Another option is prophylactic diversion, once MR rectum results and if symptoms worsen will reach out to my colorectal surgery colleagues, though again I do not think complete obstruction is likely at this time.     4. Given hysterectomy, no role for pap smear. Given questionable urethral mass and urinary symptoms, will make referral to urology; MR rectum may elucidate this further.     5. Her pain is tolerable and given risks of obstruction with constipating narcotic pain medications (despite bowel regimen), her preference is to hold off and use current pain regimen. If pain worsens she will let me know.     Risks, benefits, and alternatives as well as treatment rationale were discussed in detail. All questions were answered and expectations during treatment process were reviewed. Informed consent obtained.      Thank you for involving me in your patient's care. Total time 75 minutes, with 45 minutes spent face-to-face with the patient.     Walt No MD  Radiation Oncology     [Normal Sclera/Conjunctiva] : normal sclera/conjunctiva [EOMI] : extra ocular movement intact [No Proptosis] : no proptosis [No Respiratory Distress] : no respiratory distress [No Accessory Muscle Use] : no accessory muscle use [Clear to Auscultation] : lungs were clear to auscultation bilaterally [Normal S1, S2] : normal S1 and S2 [Normal Rate] : heart rate was normal [Regular Rhythm] : with a regular rhythm [No Edema] : no peripheral edema [Not Distended] : not distended [Normal Anterior Cervical Nodes] : no anterior cervical lymphadenopathy [Oriented x3] : oriented to person, place, and time [Normal Affect] : the affect was normal [Normal Mood] : the mood was normal [Acanthosis Nigricans] : no acanthosis nigricans [de-identified] : fullness in the thyroid

## 2023-07-12 NOTE — ASSESSMENT
[FreeTextEntry1] : 58 year old female, PMH significant for breast cancer s/p double mastectomy with reconstruction in 6/2020, now in remission, also with prediabetes, hypothyroidism, and history of hyperprolactinemia. \par \par 1. Prediabetes- A1c 6%. \par -Continue Metformin 500 mg BID.\par -Continue to work on lifestyle modifications- diet, exercise, and weight loss- to prevent progression to T2DM.\par -Continue SMBG.\par -Discussed GLP1 RA therapy. Advised against it due to family history of pancreatic cancer in both her father and her uncle.\par -Repeat A1c and RTO for follow-up with MD In 6 months.\par \par 2. Hashimoto's hypothyroidism- TPO ab 246.0, Tg ab neg\par -Euthyroid on replacement T4.\par -Continue Unithroid 25 mcg daily.\par \par 3. Hyperprolactinemia- on Cabergoline in the past, but tapered off after menopause in 2018.\par -Prolactin level normal.\par -Repeat prolactin in 6 months.\par -No need for repeat imaging in postmenopausal woman unless levels rise to >200.\par \par 4. Hyperlipidemia- improved, now at goal.\par -Continue statin.\par -Continue diet and exercise.\par \par 5. Vitamin D deficiency- resolved.\par -Continue vitamin D 2000 IU daily. \par \par 6. Obesity\par -Reviewed importance of diet and exercise in promoting weight loss.\par -Discussed balanced meals.\par -Avoid GLP1 RA therapy due to family history of pancreatic cancer in father and uncle.\par -Given referral to bariatric specialist.

## 2023-07-12 NOTE — REASON FOR VISIT
[Follow - Up] : a follow-up visit [Hypothyroidism] : hypothyroidism [Pituitary Evaluation/ Disorder] : pituitary evaluation/disorder [Other___] : [unfilled] [Weight Management/Obesity] : weight management/obesity

## 2023-07-12 NOTE — REVIEW OF SYSTEMS
[Fatigue] : no fatigue [Recent Weight Gain (___ Lbs)] : no recent weight gain [Recent Weight Loss (___ Lbs)] : no recent weight loss [Dysphagia] : no dysphagia [Neck Pain] : no neck pain [Chest Pain] : no chest pain [Palpitations] : no palpitations [Shortness Of Breath] : no shortness of breath [Nausea] : no nausea [Constipation] : no constipation [Abdominal Pain] : no abdominal pain [Vomiting] : no vomiting [Diarrhea] : no diarrhea [Polyuria] : no polyuria [Tremors] : no tremors [Pain/Numbness of Digits] : no pain/numbness of digits [Depression] : no depression [Anxiety] : no anxiety [Polydipsia] : no polydipsia

## 2023-07-17 ENCOUNTER — TRANSCRIPTION ENCOUNTER (OUTPATIENT)
Age: 58
End: 2023-07-17

## 2023-08-08 ENCOUNTER — TRANSCRIPTION ENCOUNTER (OUTPATIENT)
Age: 58
End: 2023-08-08

## 2023-08-24 ENCOUNTER — OUTPATIENT (OUTPATIENT)
Dept: OUTPATIENT SERVICES | Facility: HOSPITAL | Age: 58
LOS: 1 days | Discharge: HOME | End: 2023-08-24

## 2023-08-24 DIAGNOSIS — Z98.890 OTHER SPECIFIED POSTPROCEDURAL STATES: Chronic | ICD-10-CM

## 2023-08-24 DIAGNOSIS — Z45.2 ENCOUNTER FOR ADJUSTMENT AND MANAGEMENT OF VASCULAR ACCESS DEVICE: Chronic | ICD-10-CM

## 2023-08-24 DIAGNOSIS — Z90.13 ACQUIRED ABSENCE OF BILATERAL BREASTS AND NIPPLES: Chronic | ICD-10-CM

## 2023-08-24 DIAGNOSIS — L73.9 FOLLICULAR DISORDER, UNSPECIFIED: Chronic | ICD-10-CM

## 2023-08-24 DIAGNOSIS — C50.919 MALIGNANT NEOPLASM OF UNSPECIFIED SITE OF UNSPECIFIED FEMALE BREAST: ICD-10-CM

## 2023-08-28 ENCOUNTER — RESULT REVIEW (OUTPATIENT)
Age: 58
End: 2023-08-28

## 2023-08-28 ENCOUNTER — APPOINTMENT (OUTPATIENT)
Dept: HEMATOLOGY ONCOLOGY | Facility: CLINIC | Age: 58
End: 2023-08-28
Payer: COMMERCIAL

## 2023-08-28 VITALS
HEIGHT: 70 IN | TEMPERATURE: 98.1 F | SYSTOLIC BLOOD PRESSURE: 119 MMHG | DIASTOLIC BLOOD PRESSURE: 75 MMHG | WEIGHT: 260.7 LBS | BODY MASS INDEX: 37.32 KG/M2 | OXYGEN SATURATION: 97 % | HEART RATE: 99 BPM

## 2023-08-28 LAB
BASOPHILS # BLD AUTO: 0.1 K/UL — SIGNIFICANT CHANGE UP (ref 0–0.2)
BASOPHILS NFR BLD AUTO: 1 % — SIGNIFICANT CHANGE UP (ref 0–2)
EOSINOPHIL # BLD AUTO: 0.1 K/UL — SIGNIFICANT CHANGE UP (ref 0–0.5)
EOSINOPHIL NFR BLD AUTO: 1.4 % — SIGNIFICANT CHANGE UP (ref 0–6)
HCT VFR BLD CALC: 39.2 % — SIGNIFICANT CHANGE UP (ref 34.5–45)
HGB BLD-MCNC: 13 G/DL — SIGNIFICANT CHANGE UP (ref 11.5–15.5)
LYMPHOCYTES # BLD AUTO: 2.6 K/UL — SIGNIFICANT CHANGE UP (ref 1–3.3)
LYMPHOCYTES # BLD AUTO: 29.8 % — SIGNIFICANT CHANGE UP (ref 13–44)
MCHC RBC-ENTMCNC: 28.4 PG — SIGNIFICANT CHANGE UP (ref 27–34)
MCHC RBC-ENTMCNC: 33.2 G/DL — SIGNIFICANT CHANGE UP (ref 32–36)
MCV RBC AUTO: 85.6 FL — SIGNIFICANT CHANGE UP (ref 80–100)
MONOCYTES # BLD AUTO: 0.9 K/UL — SIGNIFICANT CHANGE UP (ref 0–0.9)
MONOCYTES NFR BLD AUTO: 10.1 % — SIGNIFICANT CHANGE UP (ref 2–14)
NEUTROPHILS # BLD AUTO: 5.1 K/UL — SIGNIFICANT CHANGE UP (ref 1.8–7.4)
NEUTROPHILS NFR BLD AUTO: 57.8 % — SIGNIFICANT CHANGE UP (ref 43–77)
PLATELET # BLD AUTO: 308 K/UL — SIGNIFICANT CHANGE UP (ref 150–400)
RBC # BLD: 4.58 M/UL — SIGNIFICANT CHANGE UP (ref 3.8–5.2)
RBC # FLD: 12.2 % — SIGNIFICANT CHANGE UP (ref 10.3–14.5)
WBC # BLD: 8.9 K/UL — SIGNIFICANT CHANGE UP (ref 3.8–10.5)
WBC # FLD AUTO: 8.9 K/UL — SIGNIFICANT CHANGE UP (ref 3.8–10.5)

## 2023-08-28 PROCEDURE — 99213 OFFICE O/P EST LOW 20 MIN: CPT

## 2023-08-28 NOTE — PHYSICAL EXAM
[Normal] : well developed, well nourished, in no acute distress [de-identified] : s/P bilateral mastectomy with reconstruction

## 2023-08-28 NOTE — HISTORY OF PRESENT ILLNESS
[de-identified] :   This 58-year-old woman initially presented with a left breast mass in September 2019. 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 Neoadjuvant dose dense Adriamycin/Cytoxan was followed by weekly Taxol, completed in February 2020. Left lumpectomy on March 30, 2020 showed complete pathologic response. On June 24, 2020 nipple sparing bilateral mastectomy was performed.  She remains free of recurrent disease. Plastic surgery is following her HARSHIL flap reconstruction sites which have recently been revised  .                 Interval History: Continues to do well. Now maintained on thyroid hormone replacement following a diagnosis of hypothyroidism.   [de-identified] : Doing well, with no significant complaints offered today.

## 2023-08-29 LAB
ALBUMIN SERPL ELPH-MCNC: 4.2 G/DL
ALP BLD-CCNC: 77 U/L
ALT SERPL-CCNC: 30 U/L
ANION GAP SERPL CALC-SCNC: 12 MMOL/L
AST SERPL-CCNC: 21 U/L
BILIRUB SERPL-MCNC: 0.3 MG/DL
BUN SERPL-MCNC: 17 MG/DL
CALCIUM SERPL-MCNC: 9.7 MG/DL
CHLORIDE SERPL-SCNC: 107 MMOL/L
CO2 SERPL-SCNC: 24 MMOL/L
CREAT SERPL-MCNC: 0.96 MG/DL
EGFR: 69 ML/MIN/1.73M2
GLUCOSE SERPL-MCNC: 85 MG/DL
POTASSIUM SERPL-SCNC: 4.5 MMOL/L
PROT SERPL-MCNC: 6.5 G/DL
SODIUM SERPL-SCNC: 143 MMOL/L

## 2023-09-04 NOTE — PHYSICAL THERAPY INITIAL EVALUATION ADULT - NUMBER OF STAIRS, REHAB EVAL
Problem: At Risk for Falls  Goal: # Patient does not fall  Outcome: Outcome Met, Continue evaluating goal progress toward completion     Problem: At Risk for Injury Due to Fall  Goal: # Patient does not fall  Outcome: Outcome Met, Continue evaluating goal progress toward completion     Problem: Pain  Goal: #Acceptable pain level achieved/maintained at rest using NRS/Faces  Description: This goal is used for patients who can self-report.  Acceptable means the level is at or below the identified comfort/function goal.  Outcome: Outcome Met, Continue evaluating goal progress toward completion     
  Problem: At Risk for Falls  Goal: # Patient does not fall  Outcome: Outcome Met, Continue evaluating goal progress toward completion     Problem: Pain  Goal: #Acceptable pain level achieved/maintained at rest using NRS/Faces  Description: This goal is used for patients who can self-report.  Acceptable means the level is at or below the identified comfort/function goal.  Outcome: Outcome Met, Continue evaluating goal progress toward completion     
4

## 2023-09-10 ENCOUNTER — APPOINTMENT (OUTPATIENT)
Dept: NUCLEAR MEDICINE | Facility: CLINIC | Age: 58
End: 2023-09-10

## 2023-09-19 ENCOUNTER — OUTPATIENT (OUTPATIENT)
Dept: OUTPATIENT SERVICES | Facility: HOSPITAL | Age: 58
LOS: 1 days | End: 2023-09-19
Payer: COMMERCIAL

## 2023-09-19 ENCOUNTER — APPOINTMENT (OUTPATIENT)
Dept: CT IMAGING | Facility: CLINIC | Age: 58
End: 2023-09-19
Payer: COMMERCIAL

## 2023-09-19 DIAGNOSIS — Z98.890 OTHER SPECIFIED POSTPROCEDURAL STATES: Chronic | ICD-10-CM

## 2023-09-19 DIAGNOSIS — Z00.8 ENCOUNTER FOR OTHER GENERAL EXAMINATION: ICD-10-CM

## 2023-09-19 DIAGNOSIS — L73.9 FOLLICULAR DISORDER, UNSPECIFIED: Chronic | ICD-10-CM

## 2023-09-19 DIAGNOSIS — Z90.13 ACQUIRED ABSENCE OF BILATERAL BREASTS AND NIPPLES: Chronic | ICD-10-CM

## 2023-09-19 DIAGNOSIS — Z45.2 ENCOUNTER FOR ADJUSTMENT AND MANAGEMENT OF VASCULAR ACCESS DEVICE: Chronic | ICD-10-CM

## 2023-09-19 PROCEDURE — 71260 CT THORAX DX C+: CPT | Mod: 26

## 2023-09-19 PROCEDURE — 74177 CT ABD & PELVIS W/CONTRAST: CPT

## 2023-09-19 PROCEDURE — 74177 CT ABD & PELVIS W/CONTRAST: CPT | Mod: 26

## 2023-09-19 PROCEDURE — 71260 CT THORAX DX C+: CPT

## 2023-09-20 ENCOUNTER — NON-APPOINTMENT (OUTPATIENT)
Age: 58
End: 2023-09-20

## 2023-09-20 ENCOUNTER — APPOINTMENT (OUTPATIENT)
Dept: INTERNAL MEDICINE | Facility: CLINIC | Age: 58
End: 2023-09-20
Payer: COMMERCIAL

## 2023-09-20 VITALS — WEIGHT: 257 LBS | HEIGHT: 70 IN | BODY MASS INDEX: 36.79 KG/M2

## 2023-09-20 VITALS
BODY MASS INDEX: 37.22 KG/M2 | SYSTOLIC BLOOD PRESSURE: 116 MMHG | DIASTOLIC BLOOD PRESSURE: 78 MMHG | WEIGHT: 260 LBS | HEIGHT: 70 IN | HEART RATE: 78 BPM | RESPIRATION RATE: 12 BRPM

## 2023-09-20 DIAGNOSIS — Z01.818 ENCOUNTER FOR OTHER PREPROCEDURAL EXAMINATION: ICD-10-CM

## 2023-09-20 DIAGNOSIS — G56.01 CARPAL TUNNEL SYNDROME, RIGHT UPPER LIMB: ICD-10-CM

## 2023-09-20 PROCEDURE — 99214 OFFICE O/P EST MOD 30 MIN: CPT | Mod: 25

## 2023-09-20 PROCEDURE — 93000 ELECTROCARDIOGRAM COMPLETE: CPT

## 2023-09-20 PROCEDURE — 36415 COLL VENOUS BLD VENIPUNCTURE: CPT

## 2023-09-21 LAB
ALBUMIN SERPL ELPH-MCNC: 4.5 G/DL
ALP BLD-CCNC: 82 U/L
ALT SERPL-CCNC: 24 U/L
ANION GAP SERPL CALC-SCNC: 11 MMOL/L
APPEARANCE: CLEAR
AST SERPL-CCNC: 25 U/L
BACTERIA: NEGATIVE /HPF
BASOPHILS # BLD AUTO: 0.02 K/UL
BASOPHILS NFR BLD AUTO: 0.3 %
BILIRUB SERPL-MCNC: 0.4 MG/DL
BILIRUBIN URINE: NEGATIVE
BLOOD URINE: NEGATIVE
BUN SERPL-MCNC: 15 MG/DL
CALCIUM OXALATE CRYSTALS: PRESENT
CALCIUM SERPL-MCNC: 9.8 MG/DL
CAST: 0 /LPF
CHLORIDE SERPL-SCNC: 105 MMOL/L
CHOLEST SERPL-MCNC: 179 MG/DL
CO2 SERPL-SCNC: 27 MMOL/L
COLOR: YELLOW
CREAT SERPL-MCNC: 1.29 MG/DL
CREAT SPEC-SCNC: 240 MG/DL
EGFR: 49 ML/MIN/1.73M2
EOSINOPHIL # BLD AUTO: 0.1 K/UL
EOSINOPHIL NFR BLD AUTO: 1.7 %
EPITHELIAL CELLS: 3 /HPF
ESTIMATED AVERAGE GLUCOSE: 123 MG/DL
GLUCOSE QUALITATIVE U: NEGATIVE MG/DL
GLUCOSE SERPL-MCNC: 91 MG/DL
HBA1C MFR BLD HPLC: 5.9 %
HCT VFR BLD CALC: 43.3 %
HDLC SERPL-MCNC: 87 MG/DL
HGB BLD-MCNC: 13.6 G/DL
IMM GRANULOCYTES NFR BLD AUTO: 0.2 %
KETONES URINE: NEGATIVE MG/DL
LDLC SERPL CALC-MCNC: 68 MG/DL
LEUKOCYTE ESTERASE URINE: ABNORMAL
LYMPHOCYTES # BLD AUTO: 1.99 K/UL
LYMPHOCYTES NFR BLD AUTO: 32.8 %
MAN DIFF?: NORMAL
MCHC RBC-ENTMCNC: 28.3 PG
MCHC RBC-ENTMCNC: 31.4 GM/DL
MCV RBC AUTO: 90 FL
MICROALBUMIN 24H UR DL<=1MG/L-MCNC: <1.2 MG/DL
MICROALBUMIN/CREAT 24H UR-RTO: NORMAL MG/G
MICROSCOPIC-UA: NORMAL
MONOCYTES # BLD AUTO: 0.66 K/UL
MONOCYTES NFR BLD AUTO: 10.9 %
MUCUS: PRESENT
NEUTROPHILS # BLD AUTO: 3.28 K/UL
NEUTROPHILS NFR BLD AUTO: 54.1 %
NITRITE URINE: NEGATIVE
NONHDLC SERPL-MCNC: 92 MG/DL
PH URINE: 6
PLATELET # BLD AUTO: 328 K/UL
POTASSIUM SERPL-SCNC: 4.7 MMOL/L
PROT SERPL-MCNC: 7.1 G/DL
PROTEIN URINE: NEGATIVE MG/DL
RBC # BLD: 4.81 M/UL
RBC # FLD: 14.5 %
RED BLOOD CELLS URINE: 1 /HPF
REVIEW: NORMAL
SODIUM SERPL-SCNC: 142 MMOL/L
SPECIFIC GRAVITY URINE: 1.03
T4 FREE SERPL-MCNC: 1.5 NG/DL
TRIGL SERPL-MCNC: 147 MG/DL
TSH SERPL-ACNC: 0.5 UIU/ML
UROBILINOGEN URINE: 1 MG/DL
WBC # FLD AUTO: 6.06 K/UL
WHITE BLOOD CELLS URINE: 7 /HPF

## 2023-09-21 ASSESSMENT — KOOS JR
KOOS JR RAW SCORE: 17
IMPORTED KOOS JR SCORE: 17.0
STRAIGHTENING KNEE FULLY: MODERATE
IMPORTED FORM: YES
BENDING TO THE FLOOR TO PICK UP OBJECT: SEVERE
RISING FROM SITTING: SEVERE
TWISING OR PIVOTING ON KNEE: SEVERE
GOING UP OR DOWN STAIRS: MODERATE
STANDING UPRIGHT: MODERATE
HOW SEVERE IS YOUR KNEE STIFFNESS AFTER FIRST WAKING IN MORNING: MODERATE

## 2023-10-17 DIAGNOSIS — R93.5 ABNORMAL FINDINGS ON DIAGNOSTIC IMAGING OF OTHER ABDOMINAL REGIONS, INCLUDING RETROPERITONEUM: ICD-10-CM

## 2023-10-23 DIAGNOSIS — J06.9 ACUTE UPPER RESPIRATORY INFECTION, UNSPECIFIED: ICD-10-CM

## 2023-11-07 ENCOUNTER — OUTPATIENT (OUTPATIENT)
Dept: OUTPATIENT SERVICES | Facility: HOSPITAL | Age: 58
LOS: 1 days | End: 2023-11-07
Payer: COMMERCIAL

## 2023-11-07 ENCOUNTER — APPOINTMENT (OUTPATIENT)
Dept: NUCLEAR MEDICINE | Facility: CLINIC | Age: 58
End: 2023-11-07

## 2023-11-07 ENCOUNTER — RESULT REVIEW (OUTPATIENT)
Age: 58
End: 2023-11-07

## 2023-11-07 DIAGNOSIS — Z98.890 OTHER SPECIFIED POSTPROCEDURAL STATES: Chronic | ICD-10-CM

## 2023-11-07 DIAGNOSIS — C50.919 MALIGNANT NEOPLASM OF UNSPECIFIED SITE OF UNSPECIFIED FEMALE BREAST: ICD-10-CM

## 2023-11-07 DIAGNOSIS — Z45.2 ENCOUNTER FOR ADJUSTMENT AND MANAGEMENT OF VASCULAR ACCESS DEVICE: Chronic | ICD-10-CM

## 2023-11-07 DIAGNOSIS — R93.5 ABNORMAL FINDINGS ON DIAGNOSTIC IMAGING OF OTHER ABDOMINAL REGIONS, INCLUDING RETROPERITONEUM: ICD-10-CM

## 2023-11-07 DIAGNOSIS — L73.9 FOLLICULAR DISORDER, UNSPECIFIED: Chronic | ICD-10-CM

## 2023-11-07 DIAGNOSIS — Z90.13 ACQUIRED ABSENCE OF BILATERAL BREASTS AND NIPPLES: Chronic | ICD-10-CM

## 2023-11-07 PROCEDURE — 78306 BONE IMAGING WHOLE BODY: CPT | Mod: 26

## 2023-11-07 PROCEDURE — 78830 RP LOCLZJ TUM SPECT W/CT 1: CPT | Mod: 26

## 2023-11-15 ENCOUNTER — APPOINTMENT (OUTPATIENT)
Dept: MRI IMAGING | Facility: CLINIC | Age: 58
End: 2023-11-15
Payer: COMMERCIAL

## 2023-11-15 ENCOUNTER — OUTPATIENT (OUTPATIENT)
Dept: OUTPATIENT SERVICES | Facility: HOSPITAL | Age: 58
LOS: 1 days | End: 2023-11-15

## 2023-11-15 DIAGNOSIS — Z90.13 ACQUIRED ABSENCE OF BILATERAL BREASTS AND NIPPLES: Chronic | ICD-10-CM

## 2023-11-15 DIAGNOSIS — L73.9 FOLLICULAR DISORDER, UNSPECIFIED: Chronic | ICD-10-CM

## 2023-11-15 DIAGNOSIS — Z98.890 OTHER SPECIFIED POSTPROCEDURAL STATES: Chronic | ICD-10-CM

## 2023-11-15 DIAGNOSIS — Z45.2 ENCOUNTER FOR ADJUSTMENT AND MANAGEMENT OF VASCULAR ACCESS DEVICE: Chronic | ICD-10-CM

## 2023-11-15 DIAGNOSIS — Z00.8 ENCOUNTER FOR OTHER GENERAL EXAMINATION: ICD-10-CM

## 2023-11-15 PROCEDURE — 73723 MRI JOINT LWR EXTR W/O&W/DYE: CPT | Mod: 26,LT

## 2023-11-20 ENCOUNTER — APPOINTMENT (OUTPATIENT)
Dept: DERMATOLOGY | Facility: CLINIC | Age: 58
End: 2023-11-20
Payer: COMMERCIAL

## 2023-11-20 PROCEDURE — 99213 OFFICE O/P EST LOW 20 MIN: CPT

## 2023-11-27 ENCOUNTER — RX RENEWAL (OUTPATIENT)
Age: 58
End: 2023-11-27

## 2023-11-27 RX ORDER — METFORMIN ER 500 MG 500 MG/1
500 TABLET ORAL
Qty: 180 | Refills: 3 | Status: ACTIVE | COMMUNITY
Start: 2017-05-01 | End: 1900-01-01

## 2023-11-30 ENCOUNTER — APPOINTMENT (OUTPATIENT)
Dept: INTERNAL MEDICINE | Facility: CLINIC | Age: 58
End: 2023-11-30

## 2023-12-12 ENCOUNTER — LABORATORY RESULT (OUTPATIENT)
Age: 58
End: 2023-12-12

## 2023-12-18 ENCOUNTER — APPOINTMENT (OUTPATIENT)
Dept: ENDOCRINOLOGY | Facility: CLINIC | Age: 58
End: 2023-12-18
Payer: COMMERCIAL

## 2023-12-18 VITALS
HEIGHT: 70 IN | SYSTOLIC BLOOD PRESSURE: 110 MMHG | BODY MASS INDEX: 37.08 KG/M2 | WEIGHT: 259 LBS | DIASTOLIC BLOOD PRESSURE: 74 MMHG

## 2023-12-18 DIAGNOSIS — E55.9 VITAMIN D DEFICIENCY, UNSPECIFIED: ICD-10-CM

## 2023-12-18 PROCEDURE — 99215 OFFICE O/P EST HI 40 MIN: CPT

## 2023-12-18 NOTE — HISTORY OF PRESENT ILLNESS
[FreeTextEntry1] : 58 y.o. Female with obesity and PMHx of Prediabetes, Hashimoto's', Prolactinoma, Breast Ca, s/p b/l mastectomy 2019 in remission, presents for follow up. She has been f/u with Bianca Padilla NP. Previously on Cabergoline - off since 2018 after menopause. Family Hx of pancreatic cancer - father and uncle.

## 2023-12-18 NOTE — PHYSICAL EXAM
[Alert] : alert [Obese] : obese [No Acute Distress] : no acute distress [Normal Voice/Communication] : normal voice communication [Normal Sclera/Conjunctiva] : normal sclera/conjunctiva [PERRL] : pupils equal, round and reactive to light [Normal Outer Ear/Nose] : the ears and nose were normal in appearance [Normal Hearing] : hearing was normal [No Thyroid Nodules] : no palpable thyroid nodules [No Respiratory Distress] : no respiratory distress [Clear to Auscultation] : lungs were clear to auscultation bilaterally [Normal Rate] : heart rate was normal [Regular Rhythm] : with a regular rhythm [No Edema] : no peripheral edema [Soft] : abdomen soft [Normal Supraclavicular Nodes] : no supraclavicular lymphadenopathy [Normal Anterior Cervical Nodes] : no anterior cervical lymphadenopathy [Normal Posterior Cervical Nodes] : no posterior cervical lymphadenopathy [Normal Gait] : normal gait [Acanthosis Nigricans] : no acanthosis nigricans [Normal Reflexes] : deep tendon reflexes were 2+ and symmetric [No Tremors] : no tremors [Oriented x3] : oriented to person, place, and time [Normal Affect] : the affect was normal [Normal Insight/Judgement] : insight and judgment were intact [Normal Mood] : the mood was normal [de-identified] : Mildly prominent thyroid gland [de-identified] : Obese

## 2023-12-18 NOTE — ASSESSMENT
[FreeTextEntry1] : 58 y.o. Female with obesity and PMHx of Prediabetes, Hashimoto's', Prolactinoma, Breast Ca, s/p b/l mastectomy 2019 in remission, presents for follow up. She has been f/u with Bianca Padilla NP. Previously on Cabergoline - off since 2018 after menopause. Family Hx of pancreatic cancer - father and uncle.   Currently on: Metformin 500 mg BID   # Prediabetes A1C 5.8% Continue current Metformin dose Discussed dietary aspect of management.  # Obesity Inherited - mother with obesity Has difficulties loosing wt even with stricter diet However, she is not very well physically active. Advised daily walks for at least 30 min and aerobic exercise. She c/o knee pain. I advised elliptic or stepping machines at gym if she can afford it.  # Hashimoto's Hypothyroidism Clinically and biochemically euthyroid Continue current dose of Unithroid 25 mcg daily.  # Hx of Hyperprolactinemia secondary to "small prolactinoma" Off Cabergoline since 2018 after menopause Asymptomatic Will do pituitary MRI if becomes symptomatic (headache, visual changes)  # Hx of Vit D deficiency Continue Vit D3 2000 IU daily  # HLD Lipids at goal Continue Statin  F/u with NP in 6 months.  I spent 45 min reviewing charts, lab results, discussing treatment options and educating patient.

## 2024-01-05 ENCOUNTER — RX RENEWAL (OUTPATIENT)
Age: 59
End: 2024-01-05

## 2024-01-05 RX ORDER — ATORVASTATIN CALCIUM 20 MG/1
20 TABLET, FILM COATED ORAL
Qty: 90 | Refills: 3 | Status: ACTIVE | COMMUNITY
Start: 2022-01-25 | End: 1900-01-01

## 2024-01-24 ENCOUNTER — APPOINTMENT (OUTPATIENT)
Dept: OBGYN | Facility: CLINIC | Age: 59
End: 2024-01-24
Payer: COMMERCIAL

## 2024-01-24 ENCOUNTER — TRANSCRIPTION ENCOUNTER (OUTPATIENT)
Age: 59
End: 2024-01-24

## 2024-01-24 VITALS
HEIGHT: 70 IN | BODY MASS INDEX: 36.79 KG/M2 | SYSTOLIC BLOOD PRESSURE: 124 MMHG | DIASTOLIC BLOOD PRESSURE: 80 MMHG | WEIGHT: 257 LBS

## 2024-01-24 DIAGNOSIS — D25.9 LEIOMYOMA OF UTERUS, UNSPECIFIED: ICD-10-CM

## 2024-01-24 DIAGNOSIS — E66.01 MORBID (SEVERE) OBESITY DUE TO EXCESS CALORIES: ICD-10-CM

## 2024-01-24 DIAGNOSIS — E78.2 MIXED HYPERLIPIDEMIA: ICD-10-CM

## 2024-01-24 DIAGNOSIS — Z01.419 ENCOUNTER FOR GYNECOLOGICAL EXAMINATION (GENERAL) (ROUTINE) W/OUT ABNORMAL FINDINGS: ICD-10-CM

## 2024-01-24 DIAGNOSIS — Z87.39 PERSONAL HISTORY OF OTHER DISEASES OF THE MUSCULOSKELETAL SYSTEM AND CONNECTIVE TISSUE: ICD-10-CM

## 2024-01-24 DIAGNOSIS — Z78.0 ASYMPTOMATIC MENOPAUSAL STATE: ICD-10-CM

## 2024-01-24 DIAGNOSIS — Z86.39 PERSONAL HISTORY OF OTHER ENDOCRINE, NUTRITIONAL AND METABOLIC DISEASE: ICD-10-CM

## 2024-01-24 DIAGNOSIS — Z86.018 PERSONAL HISTORY OF OTHER BENIGN NEOPLASM: ICD-10-CM

## 2024-01-24 DIAGNOSIS — R73.03 PREDIABETES.: ICD-10-CM

## 2024-01-24 PROCEDURE — 99396 PREV VISIT EST AGE 40-64: CPT

## 2024-01-24 NOTE — HISTORY OF PRESENT ILLNESS
[Y] : Patient is sexually active [N] : Patient denies prior pregnancies [Menarche Age: ____] : age at menarche was [unfilled] [Menopause Age: ____] : age at menopause was [unfilled] [PGHxTotal] : 0 [PGHxFullTerm] : 0 [PGHxPremature] : 0 [PGHxAbortions] : 0 [[facility] : 0 [PGHxABInduced] : 0 [PGHxABSpont] : 0 [PGHxEctopic] : 0 [PGHxMultBirths] : 0

## 2024-01-24 NOTE — PHYSICAL EXAM
[Chaperone Present] : A chaperone was present in the examining room during all aspects of the physical examination [Appropriately responsive] : appropriately responsive [Alert] : alert [No Acute Distress] : no acute distress [Soft] : soft [Non-tender] : non-tender [Non-distended] : non-distended [No HSM] : No HSM [No Lesions] : no lesions [No Mass] : no mass [Oriented x3] : oriented x3 [Right Breast Absent] : a total mastectomy [Breast Reconstruction Right] : breast reconstruction [Left Breast Absent] : a total mastectomy [Breast Reconstruction Left] : breast reconstruction [No Masses] : no breast masses were palpable [Labia Majora] : normal [Labia Minora] : normal [Uterine Adnexae] : normal [Normal] : normal [No Tenderness] : no tenderness [Nl Sphincter Tone] : normal sphincter tone

## 2024-01-25 ENCOUNTER — TRANSCRIPTION ENCOUNTER (OUTPATIENT)
Age: 59
End: 2024-01-25

## 2024-01-26 LAB — HPV HIGH+LOW RISK DNA PNL CVX: NOT DETECTED

## 2024-01-29 LAB — CYTOLOGY CVX/VAG DOC THIN PREP: NORMAL

## 2024-01-31 ENCOUNTER — TRANSCRIPTION ENCOUNTER (OUTPATIENT)
Age: 59
End: 2024-01-31

## 2024-02-01 ENCOUNTER — TRANSCRIPTION ENCOUNTER (OUTPATIENT)
Age: 59
End: 2024-02-01

## 2024-02-05 ENCOUNTER — APPOINTMENT (OUTPATIENT)
Dept: ULTRASOUND IMAGING | Facility: CLINIC | Age: 59
End: 2024-02-05
Payer: COMMERCIAL

## 2024-02-05 ENCOUNTER — OUTPATIENT (OUTPATIENT)
Dept: OUTPATIENT SERVICES | Facility: HOSPITAL | Age: 59
LOS: 1 days | End: 2024-02-05
Payer: COMMERCIAL

## 2024-02-05 DIAGNOSIS — Z98.890 OTHER SPECIFIED POSTPROCEDURAL STATES: Chronic | ICD-10-CM

## 2024-02-05 DIAGNOSIS — D25.9 LEIOMYOMA OF UTERUS, UNSPECIFIED: ICD-10-CM

## 2024-02-05 DIAGNOSIS — Z45.2 ENCOUNTER FOR ADJUSTMENT AND MANAGEMENT OF VASCULAR ACCESS DEVICE: Chronic | ICD-10-CM

## 2024-02-05 DIAGNOSIS — Z00.8 ENCOUNTER FOR OTHER GENERAL EXAMINATION: ICD-10-CM

## 2024-02-05 PROCEDURE — 76856 US EXAM PELVIC COMPLETE: CPT | Mod: 26,59

## 2024-02-05 PROCEDURE — 76830 TRANSVAGINAL US NON-OB: CPT

## 2024-02-05 PROCEDURE — 76830 TRANSVAGINAL US NON-OB: CPT | Mod: 26

## 2024-02-05 PROCEDURE — 76856 US EXAM PELVIC COMPLETE: CPT

## 2024-03-21 ENCOUNTER — NON-APPOINTMENT (OUTPATIENT)
Age: 59
End: 2024-03-21

## 2024-03-25 ENCOUNTER — OUTPATIENT (OUTPATIENT)
Dept: OUTPATIENT SERVICES | Facility: HOSPITAL | Age: 59
LOS: 1 days | Discharge: HOME | End: 2024-03-25

## 2024-03-25 DIAGNOSIS — Z45.2 ENCOUNTER FOR ADJUSTMENT AND MANAGEMENT OF VASCULAR ACCESS DEVICE: Chronic | ICD-10-CM

## 2024-03-25 DIAGNOSIS — C50.919 MALIGNANT NEOPLASM OF UNSPECIFIED SITE OF UNSPECIFIED FEMALE BREAST: ICD-10-CM

## 2024-03-25 DIAGNOSIS — Z90.13 ACQUIRED ABSENCE OF BILATERAL BREASTS AND NIPPLES: Chronic | ICD-10-CM

## 2024-03-25 DIAGNOSIS — Z98.890 OTHER SPECIFIED POSTPROCEDURAL STATES: Chronic | ICD-10-CM

## 2024-03-25 DIAGNOSIS — L73.9 FOLLICULAR DISORDER, UNSPECIFIED: Chronic | ICD-10-CM

## 2024-04-03 ENCOUNTER — RESULT REVIEW (OUTPATIENT)
Age: 59
End: 2024-04-03

## 2024-04-03 ENCOUNTER — APPOINTMENT (OUTPATIENT)
Dept: HEMATOLOGY ONCOLOGY | Facility: CLINIC | Age: 59
End: 2024-04-03
Payer: COMMERCIAL

## 2024-04-03 VITALS
WEIGHT: 241 LBS | SYSTOLIC BLOOD PRESSURE: 107 MMHG | BODY MASS INDEX: 34.5 KG/M2 | DIASTOLIC BLOOD PRESSURE: 71 MMHG | OXYGEN SATURATION: 97 % | TEMPERATURE: 98.7 F | HEIGHT: 70 IN | HEART RATE: 92 BPM

## 2024-04-03 DIAGNOSIS — C50.919 MALIGNANT NEOPLASM OF UNSPECIFIED SITE OF UNSPECIFIED FEMALE BREAST: ICD-10-CM

## 2024-04-03 LAB
ALBUMIN SERPL ELPH-MCNC: 4.6 G/DL
ALP BLD-CCNC: 67 U/L
ALT SERPL-CCNC: 26 U/L
ANION GAP SERPL CALC-SCNC: 12 MMOL/L
AST SERPL-CCNC: 23 U/L
BASOPHILS # BLD AUTO: 0.1 K/UL — SIGNIFICANT CHANGE UP (ref 0–0.2)
BASOPHILS NFR BLD AUTO: 1.7 % — SIGNIFICANT CHANGE UP (ref 0–2)
BILIRUB SERPL-MCNC: 0.6 MG/DL
BUN SERPL-MCNC: 8 MG/DL
CALCIUM SERPL-MCNC: 10.3 MG/DL
CHLORIDE SERPL-SCNC: 102 MMOL/L
CO2 SERPL-SCNC: 26 MMOL/L
CREAT SERPL-MCNC: 0.89 MG/DL
EGFR: 76 ML/MIN/1.73M2
EOSINOPHIL # BLD AUTO: 0.1 K/UL — SIGNIFICANT CHANGE UP (ref 0–0.5)
EOSINOPHIL NFR BLD AUTO: 1.7 % — SIGNIFICANT CHANGE UP (ref 0–6)
GLUCOSE SERPL-MCNC: 86 MG/DL
HCT VFR BLD CALC: 42.1 % — SIGNIFICANT CHANGE UP (ref 34.5–45)
HGB BLD-MCNC: 14.2 G/DL — SIGNIFICANT CHANGE UP (ref 11.5–15.5)
LYMPHOCYTES # BLD AUTO: 1.5 K/UL — SIGNIFICANT CHANGE UP (ref 1–3.3)
LYMPHOCYTES # BLD AUTO: 40.3 % — SIGNIFICANT CHANGE UP (ref 13–44)
MCHC RBC-ENTMCNC: 29.2 PG — SIGNIFICANT CHANGE UP (ref 27–34)
MCHC RBC-ENTMCNC: 33.8 G/DL — SIGNIFICANT CHANGE UP (ref 32–36)
MCV RBC AUTO: 86.3 FL — SIGNIFICANT CHANGE UP (ref 80–100)
MONOCYTES # BLD AUTO: 0.3 K/UL — SIGNIFICANT CHANGE UP (ref 0–0.9)
MONOCYTES NFR BLD AUTO: 8.7 % — SIGNIFICANT CHANGE UP (ref 2–14)
NEUTROPHILS # BLD AUTO: 1.8 K/UL — SIGNIFICANT CHANGE UP (ref 1.8–7.4)
NEUTROPHILS NFR BLD AUTO: 47.7 % — SIGNIFICANT CHANGE UP (ref 43–77)
PLATELET # BLD AUTO: 295 K/UL — SIGNIFICANT CHANGE UP (ref 150–400)
POTASSIUM SERPL-SCNC: 4.5 MMOL/L
PROT SERPL-MCNC: 7 G/DL
RBC # BLD: 4.88 M/UL — SIGNIFICANT CHANGE UP (ref 3.8–5.2)
RBC # FLD: 12.2 % — SIGNIFICANT CHANGE UP (ref 10.3–14.5)
SODIUM SERPL-SCNC: 141 MMOL/L
WBC # BLD: 3.7 K/UL — LOW (ref 3.8–10.5)
WBC # FLD AUTO: 3.7 K/UL — LOW (ref 3.8–10.5)

## 2024-04-03 PROCEDURE — 99214 OFFICE O/P EST MOD 30 MIN: CPT

## 2024-04-03 NOTE — REVIEW OF SYSTEMS
[Diarrhea: Grade 0] : Diarrhea: Grade 0 [Negative] : Allergic/Immunologic [FreeTextEntry2] : decreased appetite on Wegovy, lost 15 pounds.

## 2024-04-03 NOTE — ASSESSMENT
[FreeTextEntry1] : This 58-year-old woman initially presented with a left breast mass in September 2019. 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 Neoadjuvant dose dense Adriamycin/Cytoxan was followed by weekly Taxol, completed in February 2020. Left lumpectomy on March 30, 2020 showed complete pathologic response. On June 24, 2020 nipple sparing bilateral mastectomy was performed.  She remains free of recurrent disease. Plastic surgery is following her HARSHIL flap reconstruction sites which have recently been revised  Started Wegovy in 2/2024 and lost 15 pounds since.  Continue on 6-month surveillance schedule.   .

## 2024-04-03 NOTE — HISTORY OF PRESENT ILLNESS
[de-identified] :  This 59-year-old woman initially presented with a left breast mass in September 2019. 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 Neoadjuvant dose dense Adriamycin/Cytoxan was followed by weekly Taxol, completed in February 2020. Left lumpectomy on March 30, 2020 showed complete pathologic response. On June 24, 2020 nipple sparing bilateral mastectomy was performed.  She remains free of recurrent disease. Plastic surgery is following her HARSHIL flap reconstruction sites which have recently been revised  .                 Interval History: Continues to do well. Now maintained on thyroid hormone replacement following a diagnosis of hypothyroidism.   [de-identified] : Doing well, with no significant complaints offered today. Started Wegovy on 2/4/24 and lost 15 pounds. Tolerating well but mentions decreased appetite.

## 2024-04-03 NOTE — ADDENDUM
[FreeTextEntry1] : Documented by Matt Mason acting as scribe for Dr. Dailey on  04/03/2024.   All Medical record entries made by the Scribe were at my, Dr. Dailey's, direction and personally dictated by me on  04/03/2024. I have reviewed the chart and agree that the record accurately reflects my personal performance of the history, physical exam, assessment and plan. I have also personally directed, reviewed, and agreed with the discharge instructions.

## 2024-04-04 ENCOUNTER — TRANSCRIPTION ENCOUNTER (OUTPATIENT)
Age: 59
End: 2024-04-04

## 2024-04-04 ENCOUNTER — RX RENEWAL (OUTPATIENT)
Age: 59
End: 2024-04-04

## 2024-04-04 RX ORDER — LEVOTHYROXINE SODIUM 25 UG/1
25 TABLET ORAL
Qty: 90 | Refills: 0 | Status: ACTIVE | COMMUNITY
Start: 2021-09-28 | End: 1900-01-01

## 2024-06-12 ENCOUNTER — APPOINTMENT (OUTPATIENT)
Dept: ENDOCRINOLOGY | Facility: CLINIC | Age: 59
End: 2024-06-12

## 2024-06-24 RX ORDER — SEMAGLUTIDE 2.4 MG/.75ML
2.4 INJECTION, SOLUTION SUBCUTANEOUS
Qty: 3 | Refills: 2 | Status: ACTIVE | COMMUNITY
Start: 2024-01-24 | End: 1900-01-01

## 2024-06-25 ENCOUNTER — APPOINTMENT (OUTPATIENT)
Dept: DERMATOLOGY | Facility: CLINIC | Age: 59
End: 2024-06-25
Payer: COMMERCIAL

## 2024-06-25 PROCEDURE — 11102 TANGNTL BX SKIN SINGLE LES: CPT

## 2024-06-25 PROCEDURE — 11103 TANGNTL BX SKIN EA SEP/ADDL: CPT

## 2024-06-25 PROCEDURE — 99212 OFFICE O/P EST SF 10 MIN: CPT | Mod: 25

## 2024-07-03 ENCOUNTER — RX RENEWAL (OUTPATIENT)
Age: 59
End: 2024-07-03

## 2024-07-03 DIAGNOSIS — E03.9 HYPOTHYROIDISM, UNSPECIFIED: ICD-10-CM

## 2024-07-19 ENCOUNTER — APPOINTMENT (OUTPATIENT)
Dept: DERMATOLOGY | Facility: CLINIC | Age: 59
End: 2024-07-19

## 2024-08-07 ENCOUNTER — APPOINTMENT (OUTPATIENT)
Dept: ULTRASOUND IMAGING | Facility: CLINIC | Age: 59
End: 2024-08-07

## 2024-08-07 ENCOUNTER — RESULT REVIEW (OUTPATIENT)
Age: 59
End: 2024-08-07

## 2024-08-07 ENCOUNTER — OUTPATIENT (OUTPATIENT)
Dept: OUTPATIENT SERVICES | Facility: HOSPITAL | Age: 59
LOS: 1 days | End: 2024-08-07
Payer: COMMERCIAL

## 2024-08-07 DIAGNOSIS — C50.919 MALIGNANT NEOPLASM OF UNSPECIFIED SITE OF UNSPECIFIED FEMALE BREAST: ICD-10-CM

## 2024-08-07 DIAGNOSIS — Z98.890 OTHER SPECIFIED POSTPROCEDURAL STATES: Chronic | ICD-10-CM

## 2024-08-07 PROCEDURE — 76641 ULTRASOUND BREAST COMPLETE: CPT | Mod: 26,50

## 2024-08-07 PROCEDURE — 76641 ULTRASOUND BREAST COMPLETE: CPT

## 2024-08-09 ENCOUNTER — APPOINTMENT (OUTPATIENT)
Dept: ENDOCRINOLOGY | Facility: CLINIC | Age: 59
End: 2024-08-09

## 2024-08-09 PROCEDURE — G2211 COMPLEX E/M VISIT ADD ON: CPT | Mod: NC

## 2024-08-09 PROCEDURE — 99214 OFFICE O/P EST MOD 30 MIN: CPT

## 2024-08-09 NOTE — PHYSICAL EXAM
[Alert] : alert [Well Nourished] : well nourished [No Acute Distress] : no acute distress [Well Developed] : well developed [Normal Sclera/Conjunctiva] : normal sclera/conjunctiva [EOMI] : extra ocular movement intact [No Proptosis] : no proptosis [No Respiratory Distress] : no respiratory distress [No Accessory Muscle Use] : no accessory muscle use [Clear to Auscultation] : lungs were clear to auscultation bilaterally [Normal S1, S2] : normal S1 and S2 [Normal Rate] : heart rate was normal [Regular Rhythm] : with a regular rhythm [No Edema] : no peripheral edema [Not Distended] : not distended [Normal Anterior Cervical Nodes] : no anterior cervical lymphadenopathy [Oriented x3] : oriented to person, place, and time [Normal Affect] : the affect was normal [Normal Mood] : the mood was normal [Acanthosis Nigricans] : no acanthosis nigricans [de-identified] : fullness in the thyroid

## 2024-08-09 NOTE — HISTORY OF PRESENT ILLNESS
[FreeTextEntry1] : INITIAL VISIT: H/o Pre-Diabetes, Hashimoto's and prolactinoma. Pt was on Cabergoline in past but was tapered off about 3 years ago after going through menopause. Breast Cancer diagnosed 9/2019, double mastectomy with reconstruction in 6/2020. In remission, CT-Scan 8/30/21, no lesions found. Last Period 12/2018.  Denies headaches, blurry vision  INTERVAL HISTORY: started Wegovy with PCP in February 2024 and lost 40 pounds.   Quality: Pre-Diabetes Duration: 4 years Modifying Factors: Metformin Family Hx: Paternal Aunts  SMBG once daily, fasting in AM. Reports FBG 80s to 90s.  Current Regimen: Metformin 500 mg BID Wegovy 2.4 mg weekly, dose titrated up 1 month ago.  Eye Exam: December 2023, all ok. 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 Seeing podiatrist next month. Kidney Disease: none Heart Disease: none  Weight: lost 40 pounds Diet: eating smaller portions and making healthier choices. Does not want fatty foods. Eating more salads. Exercise: stationary bike, 5x weekly Smoking: ex-smoker ================================ Hashimoto's (Lab 9/2021: Tg ab neg, TPO ab 246.0) Onset: fatigue Duration:9/2021 Modifying factors: better with meds  Current Medications: Unithroid 25 mcg QD, takes appropriately  Thyroid U/S: 9/2021, normal thyroid u/s  Since starting Unithroid, fatigue has improved ================================ Hyperprolactinemia Prolactin 11.2 On Cabergoline in past but was tapered off about 3 years ago after going through menopause

## 2024-08-09 NOTE — REASON FOR VISIT
[Follow - Up] : a follow-up visit [Hypothyroidism] : hypothyroidism [Pituitary Evaluation/ Disorder] : pituitary evaluation/disorder [Weight Management/Obesity] : weight management/obesity [Other___] : [unfilled]

## 2024-08-09 NOTE — ASSESSMENT
[FreeTextEntry1] : 59 year old female, PMH significant for breast cancer s/p double mastectomy with reconstruction in 6/2020, now in remission, also with prediabetes, hypothyroidism, and history of hyperprolactinemia.  1. Prediabetes- A1c 5.5%. -Continue Metformin 500 mg BID. -Continue to work on lifestyle modifications- diet, exercise, and weight loss- to prevent progression to T2DM. -Continue SMBG. -Repeat A1c and RTO for follow-up with MD In 6 months.  2. Hashimoto's hypothyroidism- TPO ab 246.0, Tg ab neg -Euthyroid on replacement T4. -Continue Unithroid 25 mcg daily.  3. Hyperprolactinemia- on Cabergoline in the past, but tapered off after menopause in 2018. -Prolactin level normal. -Repeat prolactin in 6 months. -No need for repeat imaging in asymptomatic postmenopausal woman unless levels rise to >200.  4. Hyperlipidemia- improved, now at goal. -Continue statin. -Continue diet and exercise.  5. Vitamin D deficiency- resolved. -Continue vitamin D 2000 IU daily.  6. Obesity -Continue Wegovy. -Discussed risks given FH of pancreatic cancer vs benefits of weight loss. Ok to continue with PCP or now. -Reviewed importance of diet and exercise in promoting weight loss.

## 2024-08-09 NOTE — REVIEW OF SYSTEMS
[Recent Weight Loss (___ Lbs)] : recent weight loss: [unfilled] lbs [Pain/Numbness of Digits] : pain/numbness of digits [Fatigue] : no fatigue [Visual Field Defect] : no visual field defect [Blurred Vision] : no blurred vision [Dysphagia] : no dysphagia [Neck Pain] : no neck pain [Chest Pain] : no chest pain [Palpitations] : no palpitations [Shortness Of Breath] : no shortness of breath [Nausea] : no nausea [Constipation] : no constipation [Abdominal Pain] : no abdominal pain [Vomiting] : no vomiting [Diarrhea] : no diarrhea [Polyuria] : no polyuria [Tremors] : no tremors [Depression] : no depression [Anxiety] : no anxiety [Polydipsia] : no polydipsia

## 2024-09-05 ENCOUNTER — APPOINTMENT (OUTPATIENT)
Age: 59
End: 2024-09-05

## 2024-09-05 DIAGNOSIS — M24.573 CONTRACTURE, UNSPECIFIED ANKLE: ICD-10-CM

## 2024-09-05 DIAGNOSIS — M72.2 PLANTAR FASCIAL FIBROMATOSIS: ICD-10-CM

## 2024-09-05 PROCEDURE — 20550 NJX 1 TENDON SHEATH/LIGAMENT: CPT | Mod: LT

## 2024-09-05 PROCEDURE — 99203 OFFICE O/P NEW LOW 30 MIN: CPT | Mod: 25

## 2024-09-05 RX ORDER — MELOXICAM 7.5 MG/1
7.5 TABLET ORAL TWICE DAILY
Qty: 60 | Refills: 1 | Status: ACTIVE | COMMUNITY
Start: 2024-09-05 | End: 1900-01-01

## 2024-09-05 NOTE — PROCEDURE
[FreeTextEntry1] : Patient seen and discussed etiology.  Patient aware of importance of stretching and directed how.  The patient received a cortisone injection with 0.25% Marcaine 1% lidocaine and dexamethasone phosphate to the left medial heel.  Prescription given for meloxicam 7.5 mg twice daily she will follow-up with me in 2 weeks

## 2024-09-05 NOTE — PHYSICAL EXAM
[General Appearance - Alert] : alert [General Appearance - In No Acute Distress] : in no acute distress [Ankle Swelling (On Exam)] : not present [Varicose Veins Of Lower Extremities] : not present [] : not present [de-identified] : pain at the left medial heel.  no ecchymosis.  no achilles pain.  tight posterior calf muscles  [Skin Color & Pigmentation] : normal skin color and pigmentation

## 2024-09-05 NOTE — HISTORY OF PRESENT ILLNESS
[Other: ____] : [unfilled] [FreeTextEntry1] : Patient is present with bilateral foot pain, left>right. Patient states pain started about 2 months ago. She sttaes left foot pain is more on the heel and the lateral side. First step AM is painful. Right foot is more numbness of the big s/p chemo treatment for breast cancer 2020.

## 2024-09-10 ENCOUNTER — APPOINTMENT (OUTPATIENT)
Dept: INTERNAL MEDICINE | Facility: CLINIC | Age: 59
End: 2024-09-10

## 2024-09-10 VITALS
BODY MASS INDEX: 31.35 KG/M2 | RESPIRATION RATE: 16 BRPM | HEART RATE: 78 BPM | HEIGHT: 70 IN | DIASTOLIC BLOOD PRESSURE: 77 MMHG | WEIGHT: 219 LBS | SYSTOLIC BLOOD PRESSURE: 118 MMHG

## 2024-09-10 DIAGNOSIS — E03.9 HYPOTHYROIDISM, UNSPECIFIED: ICD-10-CM

## 2024-09-10 DIAGNOSIS — E66.01 MORBID (SEVERE) OBESITY DUE TO EXCESS CALORIES: ICD-10-CM

## 2024-09-10 DIAGNOSIS — Z90.13 ACQUIRED ABSENCE OF BILATERAL BREASTS AND NIPPLES: ICD-10-CM

## 2024-09-10 DIAGNOSIS — R73.9 HYPERGLYCEMIA, UNSPECIFIED: ICD-10-CM

## 2024-09-10 DIAGNOSIS — Z23 ENCOUNTER FOR IMMUNIZATION: ICD-10-CM

## 2024-09-10 PROCEDURE — G2211 COMPLEX E/M VISIT ADD ON: CPT | Mod: NC

## 2024-09-10 PROCEDURE — 36415 COLL VENOUS BLD VENIPUNCTURE: CPT

## 2024-09-10 PROCEDURE — 99214 OFFICE O/P EST MOD 30 MIN: CPT | Mod: 25

## 2024-09-10 PROCEDURE — G0008: CPT

## 2024-09-10 PROCEDURE — 90656 IIV3 VACC NO PRSV 0.5 ML IM: CPT

## 2024-09-10 NOTE — HEALTH RISK ASSESSMENT
[No] : No [No falls in past year] : Patient reported no falls in the past year [Little interest or pleasure doing things] : 1) Little interest or pleasure doing things [Feeling down, depressed, or hopeless] : 2) Feeling down, depressed, or hopeless [0] : 2) Feeling down, depressed, or hopeless: Not at all (0) [PHQ-2 Negative - No further assessment needed] : PHQ-2 Negative - No further assessment needed [[id number]] : 0 [Never] : Never

## 2024-09-10 NOTE — HISTORY OF PRESENT ILLNESS
[FreeTextEntry1] : follow up dm hld breast cancer [de-identified] : follow up dm hld breast cancer survivor has been well no new issues  is  doing well no chest pain sob energy levels returning

## 2024-09-11 LAB
ALBUMIN SERPL ELPH-MCNC: 4.4 G/DL
ALP BLD-CCNC: 70 U/L
ALT SERPL-CCNC: 34 U/L
ANION GAP SERPL CALC-SCNC: 11 MMOL/L
AST SERPL-CCNC: 29 U/L
BASOPHILS # BLD AUTO: 0.02 K/UL
BASOPHILS NFR BLD AUTO: 0.4 %
BILIRUB SERPL-MCNC: 0.9 MG/DL
BUN SERPL-MCNC: 15 MG/DL
CALCIUM SERPL-MCNC: 9.6 MG/DL
CHLORIDE SERPL-SCNC: 106 MMOL/L
CHOLEST SERPL-MCNC: 173 MG/DL
CO2 SERPL-SCNC: 27 MMOL/L
CREAT SERPL-MCNC: 0.86 MG/DL
EGFR: 79 ML/MIN/1.73M2
EOSINOPHIL # BLD AUTO: 0.06 K/UL
EOSINOPHIL NFR BLD AUTO: 1.3 %
ESTIMATED AVERAGE GLUCOSE: 114 MG/DL
GLUCOSE SERPL-MCNC: 91 MG/DL
HBA1C MFR BLD HPLC: 5.6 %
HCT VFR BLD CALC: 41.9 %
HDLC SERPL-MCNC: 94 MG/DL
HGB BLD-MCNC: 13.1 G/DL
IMM GRANULOCYTES NFR BLD AUTO: 0.2 %
LDLC SERPL CALC-MCNC: 62 MG/DL
LYMPHOCYTES # BLD AUTO: 1.84 K/UL
LYMPHOCYTES NFR BLD AUTO: 41.1 %
MAN DIFF?: NORMAL
MCHC RBC-ENTMCNC: 28.2 PG
MCHC RBC-ENTMCNC: 31.3 GM/DL
MCV RBC AUTO: 90.3 FL
MONOCYTES # BLD AUTO: 0.49 K/UL
MONOCYTES NFR BLD AUTO: 10.9 %
NEUTROPHILS # BLD AUTO: 2.06 K/UL
NEUTROPHILS NFR BLD AUTO: 46.1 %
NONHDLC SERPL-MCNC: 79 MG/DL
PLATELET # BLD AUTO: 321 K/UL
POTASSIUM SERPL-SCNC: 4.8 MMOL/L
PROT SERPL-MCNC: 6.7 G/DL
RBC # BLD: 4.64 M/UL
RBC # FLD: 14.4 %
SODIUM SERPL-SCNC: 143 MMOL/L
T4 FREE SERPL-MCNC: 1.5 NG/DL
TRIGL SERPL-MCNC: 91 MG/DL
TSH SERPL-ACNC: 0.51 UIU/ML
WBC # FLD AUTO: 4.48 K/UL

## 2024-09-23 ENCOUNTER — TRANSCRIPTION ENCOUNTER (OUTPATIENT)
Age: 59
End: 2024-09-23

## 2024-09-26 ENCOUNTER — APPOINTMENT (OUTPATIENT)
Age: 59
End: 2024-09-26

## 2024-09-26 ENCOUNTER — TRANSCRIPTION ENCOUNTER (OUTPATIENT)
Age: 59
End: 2024-09-26

## 2024-09-26 DIAGNOSIS — M24.573 CONTRACTURE, UNSPECIFIED ANKLE: ICD-10-CM

## 2024-09-26 PROCEDURE — 99213 OFFICE O/P EST LOW 20 MIN: CPT | Mod: 25

## 2024-09-26 PROCEDURE — 20550 NJX 1 TENDON SHEATH/LIGAMENT: CPT | Mod: LT

## 2024-09-27 ENCOUNTER — OUTPATIENT (OUTPATIENT)
Dept: OUTPATIENT SERVICES | Facility: HOSPITAL | Age: 59
LOS: 1 days | Discharge: HOME | End: 2024-09-27

## 2024-09-27 DIAGNOSIS — Z98.890 OTHER SPECIFIED POSTPROCEDURAL STATES: Chronic | ICD-10-CM

## 2024-09-27 DIAGNOSIS — Z90.13 ACQUIRED ABSENCE OF BILATERAL BREASTS AND NIPPLES: Chronic | ICD-10-CM

## 2024-09-27 DIAGNOSIS — C50.919 MALIGNANT NEOPLASM OF UNSPECIFIED SITE OF UNSPECIFIED FEMALE BREAST: ICD-10-CM

## 2024-09-27 DIAGNOSIS — Z45.2 ENCOUNTER FOR ADJUSTMENT AND MANAGEMENT OF VASCULAR ACCESS DEVICE: Chronic | ICD-10-CM

## 2024-09-27 NOTE — OCCUPATIONAL THERAPY INITIAL EVALUATION ADULT - PAIN SENSATION, HEAD/NECK, REHAB EVAL
The AAP I completed on 8/30/24 has Symbiort listed, please send that one to the school RN below, thanks.    within normal limits

## 2024-10-02 NOTE — PHYSICAL EXAM
[General Appearance - Alert] : alert [General Appearance - In No Acute Distress] : in no acute distress [Skin Color & Pigmentation] : normal skin color and pigmentation [Ankle Swelling (On Exam)] : not present [Varicose Veins Of Lower Extremities] : not present [] : not present [de-identified] : pain at the left medial heel.  no ecchymosis.  no achilles pain.  tight posterior calf muscles

## 2024-10-02 NOTE — ASSESSMENT
[FreeTextEntry1] : Patient seen for follow-up of chronic plantar fasciitis on the left side.  She is going to continue the stretching and the meloxicam as prescribed verbal consent obtained for a second cortisone injection to the plantar fascia with dexamethasone acetate 4 mg and Marcaine she will follow-up with me in approximately 1 month

## 2024-10-02 NOTE — PHYSICAL EXAM
[General Appearance - Alert] : alert [General Appearance - In No Acute Distress] : in no acute distress [Skin Color & Pigmentation] : normal skin color and pigmentation [Ankle Swelling (On Exam)] : not present [Varicose Veins Of Lower Extremities] : not present [] : not present [de-identified] : pain at the left medial heel.  no ecchymosis.  no achilles pain.  tight posterior calf muscles

## 2024-10-02 NOTE — HISTORY OF PRESENT ILLNESS
[Other: ____] : [unfilled] [FreeTextEntry1] : Patient is present for a f/u foot pain, left>right. Patient states pain started about 3 months ago. She sttaes left foot pain is more on the heel and the lateral side. First step AM is painful. Right foot is more numbness of the big s/p chemo treatment for breast cancer 2020. Patient relates pain has decreased with injection and meloxicam left foot

## 2024-10-09 ENCOUNTER — APPOINTMENT (OUTPATIENT)
Dept: HEMATOLOGY ONCOLOGY | Facility: CLINIC | Age: 59
End: 2024-10-09
Payer: COMMERCIAL

## 2024-10-09 VITALS
HEART RATE: 74 BPM | DIASTOLIC BLOOD PRESSURE: 78 MMHG | OXYGEN SATURATION: 99 % | BODY MASS INDEX: 31.4 KG/M2 | HEIGHT: 70 IN | TEMPERATURE: 97.2 F | WEIGHT: 219.36 LBS | SYSTOLIC BLOOD PRESSURE: 123 MMHG

## 2024-10-09 DIAGNOSIS — C50.919 MALIGNANT NEOPLASM OF UNSPECIFIED SITE OF UNSPECIFIED FEMALE BREAST: ICD-10-CM

## 2024-10-09 DIAGNOSIS — Z17.421 MALIGNANT NEOPLASM OF UNSPECIFIED SITE OF UNSPECIFIED FEMALE BREAST: ICD-10-CM

## 2024-10-09 PROCEDURE — 99214 OFFICE O/P EST MOD 30 MIN: CPT

## 2024-10-31 ENCOUNTER — NON-APPOINTMENT (OUTPATIENT)
Age: 59
End: 2024-10-31

## 2024-10-31 ENCOUNTER — APPOINTMENT (OUTPATIENT)
Age: 59
End: 2024-10-31
Payer: COMMERCIAL

## 2024-10-31 ENCOUNTER — APPOINTMENT (OUTPATIENT)
Dept: INTERNAL MEDICINE | Facility: CLINIC | Age: 59
End: 2024-10-31
Payer: COMMERCIAL

## 2024-10-31 VITALS — HEART RATE: 76 BPM | DIASTOLIC BLOOD PRESSURE: 78 MMHG | RESPIRATION RATE: 12 BRPM | SYSTOLIC BLOOD PRESSURE: 118 MMHG

## 2024-10-31 VITALS — WEIGHT: 217.13 LBS | HEIGHT: 70 IN | BODY MASS INDEX: 31.09 KG/M2

## 2024-10-31 DIAGNOSIS — M72.2 PLANTAR FASCIAL FIBROMATOSIS: ICD-10-CM

## 2024-10-31 DIAGNOSIS — Z00.00 ENCOUNTER FOR GENERAL ADULT MEDICAL EXAMINATION W/OUT ABNORMAL FINDINGS: ICD-10-CM

## 2024-10-31 DIAGNOSIS — E03.9 HYPOTHYROIDISM, UNSPECIFIED: ICD-10-CM

## 2024-10-31 PROCEDURE — G0444 DEPRESSION SCREEN ANNUAL: CPT | Mod: 59

## 2024-10-31 PROCEDURE — 99213 OFFICE O/P EST LOW 20 MIN: CPT

## 2024-10-31 PROCEDURE — 99396 PREV VISIT EST AGE 40-64: CPT

## 2024-10-31 PROCEDURE — 93000 ELECTROCARDIOGRAM COMPLETE: CPT | Mod: 59

## 2024-11-13 ENCOUNTER — APPOINTMENT (OUTPATIENT)
Dept: DERMATOLOGY | Facility: CLINIC | Age: 59
End: 2024-11-13
Payer: COMMERCIAL

## 2024-11-13 PROCEDURE — 99214 OFFICE O/P EST MOD 30 MIN: CPT

## 2025-01-07 ENCOUNTER — RX RENEWAL (OUTPATIENT)
Age: 60
End: 2025-01-07

## 2025-01-29 ENCOUNTER — APPOINTMENT (OUTPATIENT)
Dept: OBGYN | Facility: CLINIC | Age: 60
End: 2025-01-29
Payer: COMMERCIAL

## 2025-01-29 ENCOUNTER — LABORATORY RESULT (OUTPATIENT)
Age: 60
End: 2025-01-29

## 2025-01-29 VITALS
WEIGHT: 218 LBS | HEIGHT: 70 IN | SYSTOLIC BLOOD PRESSURE: 118 MMHG | BODY MASS INDEX: 31.21 KG/M2 | DIASTOLIC BLOOD PRESSURE: 70 MMHG

## 2025-01-29 DIAGNOSIS — Z13.820 ENCOUNTER FOR SCREENING FOR OSTEOPOROSIS: ICD-10-CM

## 2025-01-29 DIAGNOSIS — Z01.419 ENCOUNTER FOR GYNECOLOGICAL EXAMINATION (GENERAL) (ROUTINE) W/OUT ABNORMAL FINDINGS: ICD-10-CM

## 2025-01-29 DIAGNOSIS — G56.01 CARPAL TUNNEL SYNDROME, RIGHT UPPER LIMB: ICD-10-CM

## 2025-01-29 DIAGNOSIS — Z86.39 PERSONAL HISTORY OF OTHER ENDOCRINE, NUTRITIONAL AND METABOLIC DISEASE: ICD-10-CM

## 2025-01-29 DIAGNOSIS — Z78.0 ASYMPTOMATIC MENOPAUSAL STATE: ICD-10-CM

## 2025-01-29 PROCEDURE — 99459 PELVIC EXAMINATION: CPT

## 2025-01-29 PROCEDURE — 99396 PREV VISIT EST AGE 40-64: CPT

## 2025-01-30 LAB — HPV HIGH+LOW RISK DNA PNL CVX: NOT DETECTED

## 2025-02-03 ENCOUNTER — APPOINTMENT (OUTPATIENT)
Dept: ENDOCRINOLOGY | Facility: CLINIC | Age: 60
End: 2025-02-03
Payer: COMMERCIAL

## 2025-02-03 VITALS
HEART RATE: 89 BPM | BODY MASS INDEX: 31.21 KG/M2 | OXYGEN SATURATION: 98 % | WEIGHT: 218 LBS | DIASTOLIC BLOOD PRESSURE: 70 MMHG | HEIGHT: 70 IN | SYSTOLIC BLOOD PRESSURE: 124 MMHG

## 2025-02-03 DIAGNOSIS — R73.03 PREDIABETES.: ICD-10-CM

## 2025-02-03 DIAGNOSIS — E22.1 HYPERPROLACTINEMIA: ICD-10-CM

## 2025-02-03 DIAGNOSIS — E55.9 VITAMIN D DEFICIENCY, UNSPECIFIED: ICD-10-CM

## 2025-02-03 DIAGNOSIS — E03.9 HYPOTHYROIDISM, UNSPECIFIED: ICD-10-CM

## 2025-02-03 LAB — CYTOLOGY CVX/VAG DOC THIN PREP: ABNORMAL

## 2025-02-03 PROCEDURE — 99214 OFFICE O/P EST MOD 30 MIN: CPT

## 2025-02-03 PROCEDURE — G2211 COMPLEX E/M VISIT ADD ON: CPT | Mod: NC

## 2025-02-03 PROCEDURE — 99401 PREV MED CNSL INDIV APPRX 15: CPT | Mod: 25

## 2025-03-07 ENCOUNTER — APPOINTMENT (OUTPATIENT)
Dept: RADIOLOGY | Facility: CLINIC | Age: 60
End: 2025-03-07
Payer: COMMERCIAL

## 2025-03-07 ENCOUNTER — OUTPATIENT (OUTPATIENT)
Dept: OUTPATIENT SERVICES | Facility: HOSPITAL | Age: 60
LOS: 1 days | End: 2025-03-07
Payer: COMMERCIAL

## 2025-03-07 ENCOUNTER — RESULT REVIEW (OUTPATIENT)
Age: 60
End: 2025-03-07

## 2025-03-07 ENCOUNTER — APPOINTMENT (OUTPATIENT)
Dept: ULTRASOUND IMAGING | Facility: CLINIC | Age: 60
End: 2025-03-07
Payer: COMMERCIAL

## 2025-03-07 DIAGNOSIS — Z90.13 ACQUIRED ABSENCE OF BILATERAL BREASTS AND NIPPLES: Chronic | ICD-10-CM

## 2025-03-07 DIAGNOSIS — Z13.820 ENCOUNTER FOR SCREENING FOR OSTEOPOROSIS: ICD-10-CM

## 2025-03-07 DIAGNOSIS — L73.9 FOLLICULAR DISORDER, UNSPECIFIED: Chronic | ICD-10-CM

## 2025-03-07 DIAGNOSIS — Z98.890 OTHER SPECIFIED POSTPROCEDURAL STATES: Chronic | ICD-10-CM

## 2025-03-07 DIAGNOSIS — Z45.2 ENCOUNTER FOR ADJUSTMENT AND MANAGEMENT OF VASCULAR ACCESS DEVICE: Chronic | ICD-10-CM

## 2025-03-07 DIAGNOSIS — Z85.3 PERSONAL HISTORY OF MALIGNANT NEOPLASM OF BREAST: ICD-10-CM

## 2025-03-07 PROCEDURE — 76641 ULTRASOUND BREAST COMPLETE: CPT

## 2025-03-07 PROCEDURE — 77080 DXA BONE DENSITY AXIAL: CPT | Mod: 26

## 2025-03-07 PROCEDURE — 76641 ULTRASOUND BREAST COMPLETE: CPT | Mod: 26,50

## 2025-03-07 PROCEDURE — 77080 DXA BONE DENSITY AXIAL: CPT

## 2025-03-13 ENCOUNTER — RESULT REVIEW (OUTPATIENT)
Age: 60
End: 2025-03-13

## 2025-03-13 ENCOUNTER — APPOINTMENT (OUTPATIENT)
Dept: ULTRASOUND IMAGING | Facility: CLINIC | Age: 60
End: 2025-03-13
Payer: COMMERCIAL

## 2025-03-13 ENCOUNTER — OUTPATIENT (OUTPATIENT)
Dept: OUTPATIENT SERVICES | Facility: HOSPITAL | Age: 60
LOS: 1 days | End: 2025-03-13
Payer: COMMERCIAL

## 2025-03-13 DIAGNOSIS — Z90.13 ACQUIRED ABSENCE OF BILATERAL BREASTS AND NIPPLES: Chronic | ICD-10-CM

## 2025-03-13 DIAGNOSIS — Z98.890 OTHER SPECIFIED POSTPROCEDURAL STATES: Chronic | ICD-10-CM

## 2025-03-13 DIAGNOSIS — L73.9 FOLLICULAR DISORDER, UNSPECIFIED: Chronic | ICD-10-CM

## 2025-03-13 DIAGNOSIS — Z85.3 PERSONAL HISTORY OF MALIGNANT NEOPLASM OF BREAST: ICD-10-CM

## 2025-03-13 DIAGNOSIS — Z45.2 ENCOUNTER FOR ADJUSTMENT AND MANAGEMENT OF VASCULAR ACCESS DEVICE: Chronic | ICD-10-CM

## 2025-03-13 DIAGNOSIS — Z90.13 ACQUIRED ABSENCE OF BILATERAL BREASTS AND NIPPLES: ICD-10-CM

## 2025-03-13 DIAGNOSIS — N63.20 UNSPECIFIED LUMP IN THE LEFT BREAST, UNSPECIFIED QUADRANT: ICD-10-CM

## 2025-03-13 PROCEDURE — 88305 TISSUE EXAM BY PATHOLOGIST: CPT | Mod: 26

## 2025-03-13 PROCEDURE — 19083 BX BREAST 1ST LESION US IMAG: CPT | Mod: LT

## 2025-03-13 PROCEDURE — 88305 TISSUE EXAM BY PATHOLOGIST: CPT

## 2025-03-13 PROCEDURE — 19083 BX BREAST 1ST LESION US IMAG: CPT

## 2025-03-13 PROCEDURE — A4648: CPT

## 2025-03-26 ENCOUNTER — OUTPATIENT (OUTPATIENT)
Dept: OUTPATIENT SERVICES | Facility: HOSPITAL | Age: 60
LOS: 1 days | Discharge: HOME | End: 2025-03-26

## 2025-03-26 DIAGNOSIS — Z90.13 ACQUIRED ABSENCE OF BILATERAL BREASTS AND NIPPLES: Chronic | ICD-10-CM

## 2025-03-26 DIAGNOSIS — Z45.2 ENCOUNTER FOR ADJUSTMENT AND MANAGEMENT OF VASCULAR ACCESS DEVICE: Chronic | ICD-10-CM

## 2025-03-26 DIAGNOSIS — L73.9 FOLLICULAR DISORDER, UNSPECIFIED: Chronic | ICD-10-CM

## 2025-03-26 DIAGNOSIS — Z98.890 OTHER SPECIFIED POSTPROCEDURAL STATES: Chronic | ICD-10-CM

## 2025-04-02 ENCOUNTER — APPOINTMENT (OUTPATIENT)
Dept: HEMATOLOGY ONCOLOGY | Facility: CLINIC | Age: 60
End: 2025-04-02
Payer: COMMERCIAL

## 2025-04-02 ENCOUNTER — RESULT REVIEW (OUTPATIENT)
Age: 60
End: 2025-04-02

## 2025-04-02 VITALS
OXYGEN SATURATION: 98 % | HEIGHT: 70 IN | DIASTOLIC BLOOD PRESSURE: 72 MMHG | HEART RATE: 80 BPM | WEIGHT: 213.62 LBS | BODY MASS INDEX: 30.58 KG/M2 | TEMPERATURE: 97.8 F | SYSTOLIC BLOOD PRESSURE: 110 MMHG

## 2025-04-02 DIAGNOSIS — Z17.421 MALIGNANT NEOPLASM OF UNSPECIFIED SITE OF UNSPECIFIED FEMALE BREAST: ICD-10-CM

## 2025-04-02 DIAGNOSIS — C50.919 MALIGNANT NEOPLASM OF UNSPECIFIED SITE OF UNSPECIFIED FEMALE BREAST: ICD-10-CM

## 2025-04-02 LAB
BASOPHILS # BLD AUTO: 0.04 K/UL — SIGNIFICANT CHANGE UP (ref 0–0.2)
BASOPHILS NFR BLD AUTO: 1 % — SIGNIFICANT CHANGE UP (ref 0–2)
EOSINOPHIL # BLD AUTO: 0.07 K/UL — SIGNIFICANT CHANGE UP (ref 0–0.5)
EOSINOPHIL NFR BLD AUTO: 1.7 % — SIGNIFICANT CHANGE UP (ref 0–6)
HCT VFR BLD CALC: 43.7 % — SIGNIFICANT CHANGE UP (ref 34.5–45)
HGB BLD-MCNC: 14 G/DL — SIGNIFICANT CHANGE UP (ref 11.5–15.5)
IMM GRANULOCYTES # BLD AUTO: 0 K/UL — SIGNIFICANT CHANGE UP (ref 0–0.07)
IMM GRANULOCYTES NFR BLD AUTO: 0 % — SIGNIFICANT CHANGE UP (ref 0–0.9)
LYMPHOCYTES # BLD AUTO: 1.91 K/UL — SIGNIFICANT CHANGE UP (ref 1–3.3)
LYMPHOCYTES NFR BLD AUTO: 45.4 % — HIGH (ref 13–44)
MCHC RBC-ENTMCNC: 28.1 PG — SIGNIFICANT CHANGE UP (ref 27–34)
MCHC RBC-ENTMCNC: 32 G/DL — SIGNIFICANT CHANGE UP (ref 32–36)
MCV RBC AUTO: 87.6 FL — SIGNIFICANT CHANGE UP (ref 80–100)
MONOCYTES # BLD AUTO: 0.4 K/UL — SIGNIFICANT CHANGE UP (ref 0–0.9)
MONOCYTES NFR BLD AUTO: 9.5 % — SIGNIFICANT CHANGE UP (ref 2–14)
NEUTROPHILS # BLD AUTO: 1.79 K/UL — LOW (ref 1.8–7.4)
NEUTROPHILS NFR BLD AUTO: 42.4 % — LOW (ref 43–77)
NRBC # BLD AUTO: 0 K/UL — SIGNIFICANT CHANGE UP (ref 0–0)
NRBC # FLD: 0 K/UL — SIGNIFICANT CHANGE UP (ref 0–0)
NRBC BLD AUTO-RTO: 0 /100 WBCS — SIGNIFICANT CHANGE UP (ref 0–0)
PLATELET # BLD AUTO: 320 K/UL — SIGNIFICANT CHANGE UP (ref 150–400)
PMV BLD: 9.4 FL — SIGNIFICANT CHANGE UP (ref 7–13)
RBC # BLD: 4.99 M/UL — SIGNIFICANT CHANGE UP (ref 3.8–5.2)
RBC # FLD: 12.8 % — SIGNIFICANT CHANGE UP (ref 10.3–14.5)
WBC # BLD: 4.21 K/UL — SIGNIFICANT CHANGE UP (ref 3.8–10.5)
WBC # FLD AUTO: 4.21 K/UL — SIGNIFICANT CHANGE UP (ref 3.8–10.5)

## 2025-04-02 PROCEDURE — 99214 OFFICE O/P EST MOD 30 MIN: CPT

## 2025-04-04 LAB
ALBUMIN SERPL ELPH-MCNC: 4.5 G/DL
ALP BLD-CCNC: 73 U/L
ALT SERPL-CCNC: 34 U/L
ANION GAP SERPL CALC-SCNC: 9 MMOL/L
AST SERPL-CCNC: 31 U/L
BILIRUB SERPL-MCNC: 0.6 MG/DL
BUN SERPL-MCNC: 12 MG/DL
CALCIUM SERPL-MCNC: 10.5 MG/DL
CHLORIDE SERPL-SCNC: 104 MMOL/L
CO2 SERPL-SCNC: 28 MMOL/L
CREAT SERPL-MCNC: 0.85 MG/DL
EGFRCR SERPLBLD CKD-EPI 2021: 79 ML/MIN/1.73M2
GLUCOSE SERPL-MCNC: 90 MG/DL
POTASSIUM SERPL-SCNC: 5.1 MMOL/L
PROT SERPL-MCNC: 7.1 G/DL
SODIUM SERPL-SCNC: 141 MMOL/L

## 2025-04-07 ENCOUNTER — RX RENEWAL (OUTPATIENT)
Age: 60
End: 2025-04-07

## 2025-07-21 ENCOUNTER — APPOINTMENT (OUTPATIENT)
Age: 60
End: 2025-07-21
Payer: COMMERCIAL

## 2025-07-21 DIAGNOSIS — M79.2 NEURALGIA AND NEURITIS, UNSPECIFIED: ICD-10-CM

## 2025-07-21 DIAGNOSIS — R73.03 PREDIABETES.: ICD-10-CM

## 2025-07-21 DIAGNOSIS — G57.52 TARSAL TUNNEL SYNDROME, LEFT LOWER LIMB: ICD-10-CM

## 2025-07-21 PROCEDURE — 99213 OFFICE O/P EST LOW 20 MIN: CPT | Mod: 25

## 2025-07-21 PROCEDURE — 64450 NJX AA&/STRD OTHER PN/BRANCH: CPT | Mod: LT

## 2025-07-22 DIAGNOSIS — M65.979 UNSPECIFIED SYNOVITIS AND TENOSYNOVITIS, UNSPECIFIED ANK/FT: ICD-10-CM

## 2025-07-22 RX ORDER — MELOXICAM 7.5 MG/1
7.5 TABLET ORAL
Qty: 60 | Refills: 0 | Status: ACTIVE | COMMUNITY
Start: 2025-07-22 | End: 1900-01-01

## 2025-08-07 ENCOUNTER — TRANSCRIPTION ENCOUNTER (OUTPATIENT)
Age: 60
End: 2025-08-07

## 2025-09-04 RX ORDER — MELOXICAM 15 MG/1
15 TABLET ORAL
Qty: 30 | Refills: 1 | Status: ACTIVE | COMMUNITY
Start: 2025-09-04 | End: 2025-11-03

## 2025-09-15 ENCOUNTER — APPOINTMENT (OUTPATIENT)
Age: 60
End: 2025-09-15